# Patient Record
Sex: FEMALE | Race: WHITE | Employment: OTHER | URBAN - METROPOLITAN AREA
[De-identification: names, ages, dates, MRNs, and addresses within clinical notes are randomized per-mention and may not be internally consistent; named-entity substitution may affect disease eponyms.]

---

## 2017-04-28 PROBLEM — J30.2 SEASONAL ALLERGIC RHINITIS: Status: ACTIVE | Noted: 2017-04-28

## 2017-04-28 PROBLEM — E66.9 OBESITY (BMI 35.0-39.9 WITHOUT COMORBIDITY): Status: ACTIVE | Noted: 2017-04-28

## 2017-04-28 PROBLEM — E03.9 HYPOTHYROIDISM: Status: ACTIVE | Noted: 2017-04-28

## 2017-04-28 PROBLEM — F41.9 ANXIETY: Status: ACTIVE | Noted: 2017-04-28

## 2017-04-28 PROBLEM — K21.9 GASTROESOPHAGEAL REFLUX DISEASE WITHOUT ESOPHAGITIS: Status: ACTIVE | Noted: 2017-04-28

## 2017-04-28 PROBLEM — G47.00 INSOMNIA: Status: ACTIVE | Noted: 2017-04-28

## 2017-04-28 PROBLEM — F32.A DEPRESSION: Status: ACTIVE | Noted: 2017-04-28

## 2017-04-28 PROBLEM — M79.7 FIBROMYALGIA: Status: ACTIVE | Noted: 2017-04-28

## 2017-04-28 PROBLEM — S83.511A RIGHT ACL TEAR: Status: ACTIVE | Noted: 2017-04-28

## 2017-06-26 ENCOUNTER — HOSPITAL ENCOUNTER (OUTPATIENT)
Dept: LAB | Age: 62
Discharge: HOME | End: 2017-06-26
Attending: INTERNAL MEDICINE
Payer: MEDICARE

## 2017-06-26 DIAGNOSIS — R53.82 CHRONIC FATIGUE: ICD-10-CM

## 2017-06-26 DIAGNOSIS — E03.9 PRIMARY HYPOTHYROIDISM: ICD-10-CM

## 2017-06-26 LAB
T4 FREE SERPL-MCNC: 1.1 NG/DL (ref 0.78–1.46)
TSH W FREE THYROID I,TSHELE: 0.86 UIU/ML

## 2017-06-26 PROCEDURE — 82306 VITAMIN D 25 HYDROXY: CPT | Performed by: INTERNAL MEDICINE

## 2017-06-26 PROCEDURE — 86376 MICROSOMAL ANTIBODY EACH: CPT | Performed by: INTERNAL MEDICINE

## 2017-06-26 PROCEDURE — 84439 ASSAY OF FREE THYROXINE: CPT | Performed by: INTERNAL MEDICINE

## 2017-06-26 PROCEDURE — 84443 ASSAY THYROID STIM HORMONE: CPT | Performed by: INTERNAL MEDICINE

## 2017-06-26 PROCEDURE — 36415 COLL VENOUS BLD VENIPUNCTURE: CPT | Performed by: INTERNAL MEDICINE

## 2017-06-27 PROBLEM — E55.9 VITAMIN D DEFICIENCY: Status: ACTIVE | Noted: 2017-06-27

## 2017-06-27 LAB
25(OH)D3+25(OH)D2 SERPL-MCNC: 22.6 NG/ML (ref 30–100)
THYROPEROXIDASE AB SERPL-ACNC: 10 IU/ML (ref 0–34)

## 2017-10-11 PROBLEM — E66.01 OBESITY, MORBID, BMI 40.0-49.9 (HCC): Status: ACTIVE | Noted: 2017-10-11

## 2017-10-11 PROBLEM — M17.11 OSTEOARTHRITIS OF RIGHT KNEE: Status: ACTIVE | Noted: 2017-10-11

## 2017-10-11 PROBLEM — E66.9 OBESITY (BMI 35.0-39.9 WITHOUT COMORBIDITY): Status: RESOLVED | Noted: 2017-04-28 | Resolved: 2017-10-11

## 2017-11-21 ENCOUNTER — HOSPITAL ENCOUNTER (OUTPATIENT)
Dept: LAB | Age: 62
Discharge: HOME | End: 2017-11-21
Payer: MEDICARE

## 2017-11-21 DIAGNOSIS — E55.9 VITAMIN D DEFICIENCY: ICD-10-CM

## 2017-11-21 DIAGNOSIS — E03.9 PRIMARY HYPOTHYROIDISM: ICD-10-CM

## 2017-11-21 LAB — TSH W FREE THYROID I,TSHELE: 1.12 UIU/ML (ref 0.36–3.74)

## 2017-11-21 PROCEDURE — 36415 COLL VENOUS BLD VENIPUNCTURE: CPT | Performed by: INTERNAL MEDICINE

## 2017-11-21 PROCEDURE — 82306 VITAMIN D 25 HYDROXY: CPT | Performed by: INTERNAL MEDICINE

## 2017-11-21 PROCEDURE — 84443 ASSAY THYROID STIM HORMONE: CPT | Performed by: INTERNAL MEDICINE

## 2017-11-22 LAB — 25(OH)D3+25(OH)D2 SERPL-MCNC: 29.8 NG/ML (ref 30–100)

## 2017-11-29 PROBLEM — R53.82 CHRONIC FATIGUE: Status: ACTIVE | Noted: 2017-11-29

## 2017-12-04 ENCOUNTER — HOSPITAL ENCOUNTER (OUTPATIENT)
Dept: MAMMOGRAPHY | Age: 62
Discharge: HOME | End: 2017-12-04
Attending: NURSE PRACTITIONER
Payer: MEDICARE

## 2017-12-04 DIAGNOSIS — Z12.39 SCREENING FOR BREAST CANCER: ICD-10-CM

## 2017-12-04 PROCEDURE — 77067 SCR MAMMO BI INCL CAD: CPT

## 2018-02-28 PROBLEM — R73.9 BLOOD GLUCOSE ELEVATED: Status: ACTIVE | Noted: 2018-02-28

## 2018-03-13 ENCOUNTER — TELEPHONE (OUTPATIENT)
Dept: NUTRITION | Age: 63
End: 2018-03-13

## 2018-03-13 NOTE — TELEPHONE ENCOUNTER
Nutrition Counseling: Called pt and left voicemail with contact information regarding Regulo Walton NP's referral for nutrition counseling.     Nereyda Rascon, [address],   Outpatient Dietitian  Office: [phone]  Cell: [phone]

## 2018-03-14 ENCOUNTER — TELEPHONE (OUTPATIENT)
Dept: NUTRITION | Age: 63
End: 2018-03-14

## 2018-03-14 NOTE — TELEPHONE ENCOUNTER
Nutrition Counseling: Pt called and left voicemail for RD to call back.  RD called and left second voicemail regarding nutrition referral.    Nicolas Blanton, [address],   Outpatient Dietitian  Office: [phone]  Cell: [phone]

## 2018-03-21 ENCOUNTER — TELEPHONE (OUTPATIENT)
Dept: NUTRITION | Age: 63
End: 2018-03-21

## 2018-03-21 NOTE — TELEPHONE ENCOUNTER
Nutrition Counseling: Called pt to f/u regarding Castanon Brake, NP's referral for nutrition counseling. Pt is interested, and would like insurance checked. Explained self-pay rates. RD will contact pt when insurance check is complete to provide coverage details and schedule appt.     Froilan David, [address] [place] [address],   Outpatient Dietitian  Office: [phone]  Cell: [phone]

## 2018-03-28 ENCOUNTER — DOCUMENTATION ONLY (OUTPATIENT)
Dept: NUTRITION | Age: 63
End: 2018-03-28

## 2018-03-28 NOTE — PROGRESS NOTES
Nutrition Counseling: Emailed pt with requested insurance coverage information for nutrition counseling. Offered self-pay rates and to schedule appt if still desired. Encouraged pt to respond with questions and/or to schedule appt.     Joey Nagy, [address] [place] [address],   Outpatient Dietitian  Office: [phone]  Cell: [phone]

## 2018-04-12 ENCOUNTER — DOCUMENTATION ONLY (OUTPATIENT)
Dept: NUTRITION | Age: 63
End: 2018-04-12

## 2018-04-12 NOTE — PROGRESS NOTES
Nutrition Counseling:  Pt has not contacted RD further to schedule appt. Will close referral for this office.     Moy Gaffney, [address] [facility] [address],   Outpatient Dietitian  Office: [phone]  Cell: [phone]

## 2018-06-28 PROBLEM — A60.00 GENITAL HERPES SIMPLEX: Status: ACTIVE | Noted: 2018-06-28

## 2018-07-02 ENCOUNTER — HOSPITAL ENCOUNTER (OUTPATIENT)
Dept: SURGERY | Age: 63
Discharge: HOME | End: 2018-07-02
Payer: MEDICARE

## 2018-07-02 ENCOUNTER — HOSPITAL ENCOUNTER (OUTPATIENT)
Dept: PHYSICAL THERAPY | Age: 63
Discharge: HOME | End: 2018-07-02
Attending: ORTHOPAEDIC SURGERY
Payer: MEDICARE

## 2018-07-02 LAB
ANION GAP SERPL CALC-SCNC: 7 MMOL/L (ref 7–16)
APPEARANCE UR: CLEAR
APTT PPP: 32.9 SEC (ref 23.2–35.3)
ATRIAL RATE: 90 BPM
BACTERIA SPEC CULT: NORMAL
BILIRUB UR QL: NEGATIVE
BUN SERPL-MCNC: 21 MG/DL (ref 8–23)
CALCIUM SERPL-MCNC: 9.7 MG/DL (ref 8.3–10.4)
CALCULATED P AXIS, ECG09: 46 DEGREES
CALCULATED R AXIS, ECG10: 46 DEGREES
CALCULATED T AXIS, ECG11: 34 DEGREES
CHLORIDE SERPL-SCNC: 104 MMOL/L (ref 98–107)
CO2 SERPL-SCNC: 31 MMOL/L (ref 21–32)
COLOR UR: YELLOW
CREAT SERPL-MCNC: 1.07 MG/DL (ref 0.6–1)
DIAGNOSIS, 93000: NORMAL
ERYTHROCYTE [DISTWIDTH] IN BLOOD BY AUTOMATED COUNT: 13.7 % (ref 11.9–14.6)
GLUCOSE SERPL-MCNC: 110 MG/DL (ref 65–100)
GLUCOSE UR STRIP.AUTO-MCNC: NEGATIVE MG/DL
HCT VFR BLD AUTO: 37.5 % (ref 35.8–46.3)
HGB BLD-MCNC: 12.2 G/DL (ref 11.7–15.4)
HGB UR QL STRIP: NEGATIVE
INR PPP: 0.9
KETONES UR QL STRIP.AUTO: NEGATIVE MG/DL
LEUKOCYTE ESTERASE UR QL STRIP.AUTO: NEGATIVE
MCH RBC QN AUTO: 28.6 PG (ref 26.1–32.9)
MCHC RBC AUTO-ENTMCNC: 32.5 G/DL (ref 31.4–35)
MCV RBC AUTO: 88 FL (ref 79.6–97.8)
NITRITE UR QL STRIP.AUTO: NEGATIVE
P-R INTERVAL, ECG05: 138 MS
PH UR STRIP: 6 [PH] (ref 5–9)
PLATELET # BLD AUTO: 233 K/UL (ref 150–450)
PMV BLD AUTO: 10.5 FL (ref 10.8–14.1)
POTASSIUM SERPL-SCNC: 4.4 MMOL/L (ref 3.5–5.1)
PROT UR STRIP-MCNC: NEGATIVE MG/DL
PROTHROMBIN TIME: 12.7 SEC (ref 11.5–14.5)
Q-T INTERVAL, ECG07: 378 MS
QRS DURATION, ECG06: 86 MS
QTC CALCULATION (BEZET), ECG08: 462 MS
RBC # BLD AUTO: 4.26 M/UL (ref 4.05–5.25)
SERVICE CMNT-IMP: NORMAL
SODIUM SERPL-SCNC: 142 MMOL/L (ref 136–145)
SP GR UR REFRACTOMETRY: 1.01 (ref 1–1.02)
UROBILINOGEN UR QL STRIP.AUTO: 0.2 EU/DL (ref 0.2–1)
VENTRICULAR RATE, ECG03: 90 BPM
WBC # BLD AUTO: 5.4 K/UL (ref 4.3–11.1)

## 2018-07-02 PROCEDURE — 93005 ELECTROCARDIOGRAM TRACING: CPT | Performed by: ANESTHESIOLOGY

## 2018-07-02 PROCEDURE — 85610 PROTHROMBIN TIME: CPT | Performed by: ORTHOPAEDIC SURGERY

## 2018-07-02 PROCEDURE — 85730 THROMBOPLASTIN TIME PARTIAL: CPT | Performed by: ORTHOPAEDIC SURGERY

## 2018-07-02 PROCEDURE — G8979 MOBILITY GOAL STATUS: HCPCS

## 2018-07-02 PROCEDURE — 97161 PT EVAL LOW COMPLEX 20 MIN: CPT

## 2018-07-02 PROCEDURE — 77030027138 HC INCENT SPIROMETER -A

## 2018-07-02 PROCEDURE — 85027 COMPLETE CBC AUTOMATED: CPT | Performed by: ORTHOPAEDIC SURGERY

## 2018-07-02 PROCEDURE — 80048 BASIC METABOLIC PNL TOTAL CA: CPT | Performed by: ORTHOPAEDIC SURGERY

## 2018-07-02 PROCEDURE — 87641 MR-STAPH DNA AMP PROBE: CPT | Performed by: ORTHOPAEDIC SURGERY

## 2018-07-02 PROCEDURE — G8978 MOBILITY CURRENT STATUS: HCPCS

## 2018-07-02 PROCEDURE — 36415 COLL VENOUS BLD VENIPUNCTURE: CPT | Performed by: ORTHOPAEDIC SURGERY

## 2018-07-02 PROCEDURE — 81003 URINALYSIS AUTO W/O SCOPE: CPT | Performed by: ORTHOPAEDIC SURGERY

## 2018-07-02 PROCEDURE — G8980 MOBILITY D/C STATUS: HCPCS

## 2018-07-02 RX ORDER — DICYCLOMINE HYDROCHLORIDE 20 MG/1
20 TABLET ORAL AS NEEDED
COMMUNITY
End: 2020-07-23

## 2018-07-02 RX ORDER — CHOLECALCIFEROL TAB 125 MCG (5000 UNIT) 125 MCG
5000 TAB ORAL DAILY
COMMUNITY
End: 2018-09-28 | Stop reason: DRUGHIGH

## 2018-07-02 RX ORDER — ACETAMINOPHEN 500 MG
500 TABLET ORAL
COMMUNITY
End: 2021-03-25 | Stop reason: ALTCHOICE

## 2018-07-02 NOTE — PERIOP NOTES
Recent Results (from the past 8 hour(s))   URINALYSIS W/ RFLX MICROSCOPIC    Collection Time: 07/02/18  7:30 AM   Result Value Ref Range    Color YELLOW      Appearance CLEAR      Specific gravity 1.012 1.001 - 1.023      pH (UA) 6.0 5.0 - 9.0      Protein NEGATIVE  NEG mg/dL    Glucose NEGATIVE  mg/dL    Ketone NEGATIVE  NEG mg/dL    Bilirubin NEGATIVE  NEG      Blood NEGATIVE  NEG      Urobilinogen 0.2 0.2 - 1.0 EU/dL    Nitrites NEGATIVE  NEG      Leukocyte Esterase NEGATIVE  NEG     CBC W/O DIFF    Collection Time: 07/02/18  7:35 AM   Result Value Ref Range    WBC 5.4 4.3 - 11.1 K/uL    RBC 4.26 4.05 - 5.25 M/uL    HGB 12.2 11.7 - 15.4 g/dL    HCT 37.5 35.8 - 46.3 %    MCV 88.0 79.6 - 97.8 FL    MCH 28.6 26.1 - 32.9 PG    MCHC 32.5 31.4 - 35.0 g/dL    RDW 13.7 11.9 - 14.6 %    PLATELET 745 397 - 512 K/uL    MPV 10.5 (L) 10.8 - 16.7 FL   METABOLIC PANEL, BASIC    Collection Time: 07/02/18  7:35 AM   Result Value Ref Range    Sodium 142 136 - 145 mmol/L    Potassium 4.4 3.5 - 5.1 mmol/L    Chloride 104 98 - 107 mmol/L    CO2 31 21 - 32 mmol/L    Anion gap 7 7 - 16 mmol/L    Glucose 110 (H) 65 - 100 mg/dL    BUN 21 8 - 23 MG/DL    Creatinine 1.07 (H) 0.6 - 1.0 MG/DL    GFR est AA >60 >60 ml/min/1.73m2    GFR est non-AA 55 (L) >60 ml/min/1.73m2    Calcium 9.7 8.3 - 10.4 MG/DL   PROTHROMBIN TIME + INR    Collection Time: 07/02/18  7:35 AM   Result Value Ref Range    Prothrombin time 12.7 11.5 - 14.5 sec    INR 0.9     PTT    Collection Time: 07/02/18  7:35 AM   Result Value Ref Range    aPTT 32.9 23.2 - 35.3 SEC

## 2018-07-02 NOTE — PERIOP NOTES
Patient verified name, , and surgery as listed in [facility]. Type 3 surgery, walk in assessment complete. Labs per surgeon: cbc, bmp, pt ,ptt, ua ; results within anesthesia guidelines; placed on chart for reference; routed via fax to PCP, Dr Marilin Noble and to surgeon, Dr Renetta Sherwood for review. Labs per anesthesia protocol: none  EKG:completed per protocol and within anesthesia guidelines. Hibiclens and instructions to return bottle on DOS given per hospital policy. Nasal Swab collected per MD order and instructions for Mupirocin nasal ointment if required. Patient provided with handouts including Guide to Surgery, Pain Management, Hand Hygiene, Blood Transfusion Education, and [facility] Brochure. Patient answered medical/surgical history questions at their best of ability. All prior to admission medications documented in [facility]. Original medication prescription bottle NOT visualized during patient appointment. Patient instructed to hold all vitamins 7 days prior to surgery and NSAIDS 5 days prior to surgery. Medications to be held: Meloxicam hold for 5 days prior to surgery. Patient instructed to continue previous medications as prescribed prior to surgery and to take the following medications the day of surgery according to anesthesia guidelines with a small sip of water:  Venlafaxine, Claritin, Levothyroxine, Atenolol, Xanax; use/bring Albuterol inhaler and Flonase nasal spray. Patient teach back successful and patient demonstrates knowledge of instruction.

## 2018-07-02 NOTE — PROGRESS NOTES
Mitra MCGHEE  : [birth year](51 y.o.) [facility] at [address], [place] [address].  [phone]       Physical Therapy Prehab Plan of Treatment and Evaluation Summary:2018    ICD-10: Treatment Diagnosis:   · Pain in Right Knee (M25.561)  · Stiffness of Right Knee, Not elsewhere classified (M25.661)  · Difficulty in walking, Not elsewhere classified (R26.2)  · Other abnormalities of gait and mobility (R26.89)  Precautions/Allergies:   Latex; Keflex [cephalexin]; Pcn [penicillins]; and Sulfa (sulfonamide antibiotics)  MEDICAL/REFERRING DIAGNOSIS:  Unilateral primary osteoarthritis, right knee [M17.11]  REFERRING PHYSICIAN: Dagoberto Gu, *  DATE OF SURGERY: 18   Assessment:   Comments:  Pain in right knee joint with decreased independence with functional mobility. Pt plans to return home following hospital stay. Pt motivated and prepared for surgery. PROBLEM LIST (Impacting functional limitations):  Ms. MCGHEE presents with the following right lower extremity(s) problems:  1. Transfers  2. Gait  3. Strength  4. Range of Motion  5. Pain   INTERVENTIONS PLANNED:  1. Home Exercise Program  2. Educational Discussion     TREATMENT PLAN: Effective Dates: 2018 TO 2018. Frequency/Duration: Patient to continue to perform home exercise program at least twice per day up until her surgery. GOALS: (Goals have been discussed and agreed upon with patient.)  Discharge Goals: Time Frame: 1 Day  1. Patient will demonstrate independence with a home exercise program designed to increase strength, range of motion and pain control to minimize functional deficits and optimize patient for total joint replacement. Rehabilitation Potential For Stated Goals: Good  Regarding Mitra Barillas Camelia's therapy, I certify that the treatment plan above will be carried out by a therapist or under their direction.   Thank you for this referral,  Naina Burrows, PT HISTORY:   Present Symptoms:  Pain Intensity 1: 4  Pain Location 1: Knee  Pain Orientation 1: Right   History of Present Injury/Illness (Reason for Referral):  Medical/Referring Diagnosis: Unilateral primary osteoarthritis, right knee [M17.11]   Past Medical History/Comorbidities:   Ms. Juan Carlos Garnica  has a past medical history of Adverse effect of anesthesia; Allergic rhinitis; Asthma; Autoimmune disease ([address] DDD (degenerative disc disease), lumbar; Depression; Diabetes ([address] Diverticulitis (2016); Fibromyalgia; Fracture (2016); GERD (gastroesophageal reflux disease); Herpes genitalis in women; Hypertension; IBS (irritable bowel syndrome); Morbid obesity ([address] Primary hypothyroidism; PUD (peptic ulcer disease); and Sleep apnea. She also has no past medical history of Aneurysm ([address] Arrhythmia; CAD (coronary artery disease); Cancer ([address] Chronic kidney disease; Chronic obstructive pulmonary disease ([address] Chronic pain; Coagulation disorder ([address] Difficult intubation; Endocarditis; Heart failure ([address] Liver disease; Malignant hyperthermia due to anesthesia; Nausea & vomiting; Nicotine vapor product user; Non-nicotine vapor product user; Pseudocholinesterase deficiency; Rheumatic fever; Seizures ([address] Stroke [facility]); or Thromboembolus ([address] Ms. Juan Carlos Garnica  has a past surgical history that includes hx hysterectomy; hx tubal ligation; hx colonoscopy (09/2016); and hx endoscopy.   Social History/Living Environment:   Home Environment: Private residence  # Steps to Enter: 1  One/Two Story Residence: Two story, live on 1st floor  # of Interior Steps: 16  Living Alone: No  Support Systems: Family member(s)  Patient Expects to be Discharged to[de-identified] Private residence  Current DME Used/Available at Home: [address], [place], straight, Walker  Tub or Shower Type: Shower  Work/Activity:  disabled  Dominant Side:  RIGHT  Current Medications:  See Pre-assessment nursing note   Number of Personal Factors/Comorbidities that affect the Plan of Care: 0: LOW COMPLEXITY   EXAMINATION:   ADLs (Current Functional Status):   Ambulation:  [] Independent  [] Walk Indoors Only  [] Walk Outdoors  [x] Use Assistive Device cane  [] Use Wheelchair Only Dressing:  [x] Independent  Requires Assistance from Someone for:  [] Sock/Shoes  [] Pants  [] Everything   Bathing/Showering:   [x] Independent  [] Requires Assistance from Someone  [] Sponge Bath Only Household Activities:  [] Routine house and yard work  [x] Light Housework Only  [] None   Observation/Orthostatic Postural Assessment:   Within defined limits   ROM/Flexibility:   Gross Assessment: Yes  AROM: Generally decreased, functional                LLE Assessment  LLE Assessment (WDL): Within defined limits      RLE AROM  R Knee Flexion: 100  R Knee Extension: 15   Strength:   Gross Assessment: Yes  Strength: Generally decreased, functional                  Functional Mobility:    Gross Assessment: Yes  Coordination: Generally decreased, functional    Gait Description (WDL): Within defined limits  Stand to Sit: Independent  Sit to Stand: Independent  Distance (ft): 1000 Feet (ft)  Ambulation - Level of Assistance: Modified independent  Assistive Device: Cane, straight  Gait Abnormalities: Antalgic          Balance:    Sitting: Intact  Standing: Intact   Body Structures Involved:  1. Bones  2. Joints  3. Muscles  4. Ligaments Body Functions Affected:  1. Neuromusculoskeletal  2. Movement Related Activities and Participation Affected:  1. Mobility   Number of elements that affect the Plan of Care: 4+: HIGH COMPLEXITY   CLINICAL PRESENTATION:   Presentation: Stable and uncomplicated: LOW COMPLEXITY   CLINICAL DECISION MAKING:   Outcome Measure:    Tool Used: Lower Extremity Functional Scale (LEFS)  Score:  Initial: 4/80 Most Recent: X/80 (Date: -- )   Interpretation of Score: 20 questions each scored on a 5 point scale with 0 representing \"extreme difficulty or unable to perform\" and 4 representing \"no difficulty\". The lower the score, the greater the functional disability. 80/80 represents no disability. Minimal detectable change is 9 points. Score 80 79-65 64-49 48-33 32-17 16-1 0   Modifier CH CI CJ CK CL CM CN     ? Mobility - Walking and Moving Around:     - CURRENT STATUS: CM - 80%-99% impaired, limited or restricted    - GOAL STATUS: CM - 80%-99% impaired, limited or restricted    - D/C STATUS:  CM - 80%-99% impaired, limited or restricted  Medical Necessity:   · Ms. Denise is expected to optimize her lower extremity strength and ROM in preparation for joint replacement surgery. Reason for Services/Other Comments:  · Achieve baseline assesment of musculoskeletal system, functional mobility and home environment. , educate in PT HEP in preparation for surgery, educate in hospital plan of care. Use of outcome tool(s) and clinical judgement create a POC that gives a: Clear prediction of patient's progress: LOW COMPLEXITY   TREATMENT:   Treatment/Session Assessment:  Patient was instructed in PT- HEP to increase strength and ROM in LEs. Answered all questions. · Post session pain:  4  · Compliance with Program/Exercises: compliant most of the time.   Total Treatment Duration:  PT Patient Time In/Time Out  Time In: 0800  Time Out: 0830    José Delvalle PT

## 2018-07-03 VITALS
RESPIRATION RATE: 16 BRPM | HEART RATE: 83 BPM | OXYGEN SATURATION: 100 % | DIASTOLIC BLOOD PRESSURE: 79 MMHG | WEIGHT: 245.25 LBS | HEIGHT: 63 IN | SYSTOLIC BLOOD PRESSURE: 132 MMHG | TEMPERATURE: 97.1 F | BODY MASS INDEX: 43.45 KG/M2

## 2018-07-03 NOTE — ADVANCED PRACTICE NURSE
Total Joint Surgery Preoperative Chart Review      Patient ID:  Nikita Osorio  [id number]  73 y.o.  1955  Surgeon: Dr. Isra Fritz  Date of Surgery: 7/25/2018  Procedure: Total Right Knee Arthroplasty  Primary Care Physician: Tara Bobby [phone]  Specialty Physician(s):      Subjective:   Nikita Osorio is a 58 y.o. WHITE OR  female who presents for preoperative evaluation for Total Right Knee arthroplasty. This is a preoperative chart review note based on data collected by the nurse at the surgical Pre-Assessment visit. Past Medical History:   Diagnosis Date    Adverse effect of anesthesia     hard to wake up    Allergic rhinitis     Asthma     Autoimmune disease ([address]     fibromyalgia    DDD (degenerative disc disease), lumbar     Depression     Diabetes ([address]     pre-diabetes~diet controlled; does not check SQBS regularly    Diverticulitis 2016    diagnosed at Colonoscopy    Fibromyalgia     Fracture 2016    right foot    GERD (gastroesophageal reflux disease)     Herpes genitalis in women     dormant    Hypertension     IBS (irritable bowel syndrome)     Morbid obesity ([address]     Primary hypothyroidism     PUD (peptic ulcer disease)     Sleep apnea     had 2 studies~\"mild\" ~no CPAP      Past Surgical History:   Procedure Laterality Date    HX COLONOSCOPY  09/2016    HX ENDOSCOPY      during colonoscopy    HX HYSTERECTOMY      HX TUBAL LIGATION       Family History   Problem Relation Age of Onset    Diabetes Sister     Cancer Mother      Lung    COPD Father     Hypertension Father     GERD Father     Thyroid Disease Daughter      Hypothyroidism      Social History   Substance Use Topics    Smoking status: Never Smoker    Smokeless tobacco: Never Used    Alcohol use Yes      Comment: rarely       Prior to Admission medications    Medication Sig Start Date End Date Taking?  Authorizing Provider   dicyclomine (BENTYL) 20 mg tablet Take 20 mg by mouth as needed. For diarrhea   Yes Historical Provider   cholecalciferol, VITAMIN D3, (VITAMIN D3) 5,000 unit tab tablet Take 5,000 Units by mouth daily. Indications: PREVENTION OF VITAMIN D DEFICIENCY   Yes Historical Provider   cyanocobalamin, vitamin B-12, (VITAMIN B-12 PO) Take  by mouth daily. Yes Historical Provider   acetaminophen (TYLENOL EXTRA STRENGTH) 500 mg tablet Take 500 mg by mouth every six (6) hours as needed for Pain. Indications: Pain   Yes Historical Provider   acyclovir (ZOVIRAX) 400 mg tablet Take 1 Tab by mouth three (3) times daily for 5 days. Patient taking differently: Take 400 mg by mouth as needed. Indications: SUPPRESSION OF RECURRENT HERPES SIMPLEX INFECTION 6/28/18 7/3/18 Yes Viji Persaud NP   amitriptyline (ELAVIL) 25 mg tablet Take 1 Tab by mouth nightly as needed for Sleep. Patient taking differently: Take 25 mg by mouth nightly as needed for Sleep. For sleep, as needed 6/27/18  Yes Viji Persaud NP   lisinopril-hydroCHLOROthiazide (PRINZIDE, ZESTORETIC) 20-12.5 mg per tablet Take 1 Tab by mouth daily. Patient taking differently: Take 1 Tab by mouth daily. Indications: hypertension 6/27/18  Yes Viji Persaud NP   loratadine (CLARITIN) 10 mg tablet Take 1 Tab by mouth daily. Patient taking differently: Take 10 mg by mouth daily. Take / use AM day of surgery  per anesthesia protocols. 6/27/18  Yes Viji Persaud NP   atenolol (TENORMIN) 50 mg tablet TAKE 1 TABLET EVERY DAY  Patient taking differently: Take 50 mg by mouth daily. TAKE 1 TABLET EVERY DAY; Take / use AM day of surgery  per anesthesia protocols. Indications: hypertension 6/27/18  Yes Viji Persaud NP   meloxicam (MOBIC) 15 mg tablet Take 1 Tab by mouth daily as needed for Pain. 6/27/18  Yes Viji Persaud NP   venlafaxine-SR [facility] 150 mg capsule Take 2 Caps by mouth daily. Patient taking differently: Take 300 mg by mouth daily. Take / use AM day of surgery  per anesthesia protocols.   Indications: ANXIETY WITH DEPRESSION, major depressive disorder 6/27/18  Yes Enoch Powell NP   omeprazole (PRILOSEC) 20 mg capsule Take 1 Cap by mouth daily. Patient taking differently: Take 20 mg by mouth nightly. Indications: gastroesophageal reflux disease 6/27/18  Yes Enoch Powell NP   albuterol (PROVENTIL HFA, VENTOLIN HFA, PROAIR HFA) 90 mcg/actuation inhaler Take 2 Puffs by inhalation every six (6) hours as needed. Patient taking differently: Take 2 Puffs by inhalation daily. Take / use AM day of surgery  per anesthesia protocols. 6/27/18  Yes Enoch Powell NP   fluticasone (FLOVENT HFA) 110 mcg/actuation inhaler Take 2 Puffs by inhalation every twelve (12) hours. 6/27/18  Yes Enoch Powell NP   fluticasone (FLONASE) 50 mcg/actuation nasal spray 2 Sprays by Both Nostrils route daily. Patient taking differently: 2 Sprays by Both Nostrils route daily. Take / use AM day of surgery  per anesthesia protocols. Indications: Allergic Rhinitis 6/27/18  Yes Enoch Powell NP   levothyroxine (SYNTHROID) 150 mcg tablet Take 1 Tab by mouth Daily (before breakfast). Patient taking differently: Take 150 mcg by mouth Daily (before breakfast). Take / use AM day of surgery  per anesthesia protocols. 6/27/18  Yes Enoch Powell NP   ALPRAZolam [facility]) 0.5 mg tablet Take 1 Tab by mouth daily as needed for Anxiety. Patient taking differently: Take 0.5 mg by mouth daily as needed for Anxiety. Take / use AM day of surgery  per anesthesia protocols. Indications: anxiety 6/27/18  Yes Enoch Powell NP   Blood-Glucose Meter monitoring kit Use as instructed. Patient's choice of glucometer. Dx: E11.9 7/18/16  Yes Historical Provider   glucose blood VI test strips (ASCENSIA AUTODISC VI, ONE TOUCH ULTRA TEST VI) strip by Does Not Apply route. 7/18/16  Yes Historical Provider   Lancets misc by Does Not Apply route.  7/18/16  Yes Historical Provider     Allergies   Allergen Reactions    Latex Other (comments)     Burns; blisters    Keflex [Cephalexin] Hives    Pcn [Penicillins] Hives    Sulfa (Sulfonamide Antibiotics) Hives          Objective:     Physical Exam:   Visit Vitals    /79 (BP 1 Location: Right arm, BP Patient Position: Sitting)    Pulse 83    Temp 97.1 °F (36.2 °C)    Resp 16    Ht 5' 3\" (1.6 m)    Wt 111.2 kg (245 lb 4 oz)    SpO2 100%    BMI 43.44 kg/m2         ECG:    EKG Results     Procedure 720 Value Units Date/Time    EKG, 12 LEAD, INITIAL [[id number]] Collected:  07/02/18 0814    Order Status:  Completed Updated:  07/02/18 1639     Ventricular Rate 90 BPM      Atrial Rate 90 BPM      P-R Interval 138 ms      QRS Duration 86 ms      Q-T Interval 378 ms      QTC Calculation (Bezet) 462 ms      Calculated P Axis 46 degrees      Calculated R Axis 46 degrees      Calculated T Axis 34 degrees      Diagnosis --     Normal sinus rhythm  Nonspecific ST abnormality  Abnormal ECG  No previous ECGs available  Confirmed by Francois Garcia ([id number]) on 7/2/2018 4:39:35 PM            Data Review:   Labs:   Results for Earline Lundborg (MRN [id number]) as of 7/3/2018 12:16   Ref.  Range 7/2/2018 07:35   Sodium Latest Ref Range: 136 - 145 mmol/L 142   Potassium Latest Ref Range: 3.5 - 5.1 mmol/L 4.4   Chloride Latest Ref Range: 98 - 107 mmol/L 104   CO2 Latest Ref Range: 21 - 32 mmol/L 31   Anion gap Latest Ref Range: 7 - 16 mmol/L 7   Glucose Latest Ref Range: 65 - 100 mg/dL 110 (H)   BUN Latest Ref Range: 8 - 23 MG/DL 21   Creatinine Latest Ref Range: 0.6 - 1.0 MG/DL 1.07 (H)   Calcium Latest Ref Range: 8.3 - 10.4 MG/DL 9.7   GFR est non-AA Latest Ref Range: >60 ml/min/1.73m2 55 (L)   GFR est AA Latest Ref Range: >60 ml/min/1.73m2 >60         Problem List:  )  Patient Active Problem List   Diagnosis Code    DDD (degenerative disc disease), lumbar M51.36    Asthma J45.909    Hypertension I10    IBS (irritable bowel syndrome) K58.9    Gastroesophageal reflux disease without esophagitis K21.9    Right ACL tear A86.119O    Fibromyalgia M79.7    Seasonal allergic rhinitis J30.2    Depression F32.9    Anxiety F41.9    Insomnia G47.00    Primary hypothyroidism E03.9    Vitamin D deficiency E55.9    Osteoarthritis of right knee M17.11    Obesity, morbid, BMI 40.0-49.9 ([facility]) E66.01    Chronic fatigue R53.82    Blood glucose elevated R73.9    Genital herpes simplex A60.00       Total Joint Surgery Pre-Assessment Recommendations:     Patient with multiple comorbidities including:  BMI greater than 40, sleep apnea not using CPAP  Patient would benefit from inpatient hospitalization with total knee surgery. Patient reports the symptoms of snoring, observed apnea and /or excessive daytime sleepiness. Will refer patient for HST based on above assessment. Recommend continuous saturation monitoring hours of sleep, during hospitalization. Albuterol every 6 hours as need during hospitalization.                  Signed By: PRESTON Lopez    July 3, 2018

## 2018-07-03 NOTE — PROGRESS NOTES
07/02/18 0730   Oxygen Therapy   O2 Sat (%) 96 %   Pulse via Oximetry 120 beats per minute   O2 Device Room air   Pre-Treatment   Breath Sounds Bilateral Clear;Diminished   Pre FEV1 (liters) 1.6 liters   % Predicted 68  (ASTHMA)   Incentive Spirometry Treatment   Actual Volume (ml) 1500 ml   Sleep Disorder Breathing Screen:     Patient reports symptoms of:   · Snoring   · Excessive daytime sleepiness with napping        HTN        DRY MOUTH  · STOP-BANG _5___  · SACS Score _6___  · Height__5'3\"___ [id number] lbs____  · DELAYED AWAKENING AFTER ANESTHESIA  · PREVIOUS DIAGNOSIS OF SIVA  · RLS     Refer patient for sleep study based on above assessment. Initial respiratory Assessment completed with pt. Pt was interviewed and evaluated in [facility] prior to surgery. Patient ID:  Blas Loza  [id number]  89 y.o.  1955  Surgeon: Dr. Lyndel Hatchet  Date of Surgery: 7/25/2018  Procedure: Total Right Knee Arthroplasty  Primary Care Physician: Tamela Coe [phone]  Specialists:                                  Pt instructed in the use of Incentive Spirometry. Pt instructed to bring Incentive Spirometer back on date of surgery & to start using Is upon return to pt room.     Pt taught proper cough technique    History of smoking:   NONE                                                       Quit date:            Secondhand smoke:PARENTS      Past procedures with Oxygen desaturation:DELAYED AWAKENING    Past Medical History:   Diagnosis Date    Adverse effect of anesthesia     hard to wake up    Allergic rhinitis     Asthma     Autoimmune disease ([address]     fibromyalgia    DDD (degenerative disc disease), lumbar     Depression     Diabetes ([address]     pre-diabetes~diet controlled; does not check SQBS regularly    Diverticulitis 2016    diagnosed at Colonoscopy    Fibromyalgia     Fracture 2016    right foot    GERD (gastroesophageal reflux disease)     Herpes genitalis in women     dormant    Hypertension     IBS (irritable bowel syndrome)     Morbid obesity (HCC)     Primary hypothyroidism     PUD (peptic ulcer disease)     Sleep apnea     had 2 studies~\"mild\" ~no CPAP                                                                                                                                                      Respiratory history:ASTHMA                                 SOB  ON EXERTION                                    Respiratory meds:  FLOVENT BID  VENTALIN MDI PRN                                       FAMILY PRESENT:                                                             NO                                        PAST SLEEP STUDY:        YES                      HX OF SIVA:                        YES        - PT HAD CPAP ION THE PAST BUT NO LONGER HAS. DANGERS OF NON-COMPLIANCE EXPLAINED TO PT                                             SIVA assessment:                                               SLEEPS ON SIDE                                                              PHYSICAL EXAM   Body mass index is 43.44 kg/(m^2).    Visit Vitals    /79 (BP 1 Location: Right arm, BP Patient Position: Sitting)    Pulse 83    Temp 97.1 °F (36.2 °C)    Resp 16    Ht 5' 3\" (1.6 m)    Wt 111.2 kg (245 lb 4 oz)    SpO2 100%    BMI 43.44 kg/m2     Neck circumference:  36.5    cm    Loud snoring:        YES                                 Witnessed apnea or wakening gasping or choking:,             DENIES,                                                                                                  Awakens with headaches:                                                  DENIES    Morning or daytime tiredness/ sleepiness:                                                                                                           TIRED   Dry mouth or sore throat in morning:                YES                                                                            Clarke stage:  4    SACS score:6    STOP/BAN                              CPAP:                       NONE                                       ALBUTEROL NEB Q6 PRN          SPACER         CONT SAT HS            Referrals:  [facility]  Pt.  Phone Number:  [phone]

## 2018-07-24 ENCOUNTER — ANESTHESIA EVENT (OUTPATIENT)
Dept: SURGERY | Age: 63
DRG: 470 | End: 2018-07-24
Payer: MEDICARE

## 2018-07-24 NOTE — H&P
[facility]  Pre Operative History and Physical Exam    Patient ID:  Kirby Abernathy  [id number]  39 y.o.  1955    Today: July 24, 2018       Assessment:   1. Arthritis of the right knee        Plan:    1. Proceed with scheduled Procedure(s) (LRB):  RIGHT KNEE ARTHROPLASTY TOTAL / DARIAN / FNB (Right)            CC:  Right knee pain    HPI:   The patient has end stage arthritis of the right knee. The patient was evaluated and examined during a consultation prior to this office visit. There have been no changes to the patient's orthopedic condition since the initial consultation. The patient has failed previous conservative treatment for this condition including antiinflammatories , and lifestyle modifications. The necessity for joint replacement is present. The patient will be admitted the day of surgery for Procedure(s) (LRB):  RIGHT KNEE ARTHROPLASTY TOTAL / Markell Radha / FNB (Right)      Past Medical/Surgical History:  Past Medical History:   Diagnosis Date    Adverse effect of anesthesia     hard to wake up    Allergic rhinitis     Asthma     Autoimmune disease ([address]     fibromyalgia    DDD (degenerative disc disease), lumbar     Depression     Diabetes ([address]     pre-diabetes~diet controlled; does not check SQBS regularly    Diverticulitis 2016    diagnosed at Colonoscopy    Fibromyalgia     Fracture 2016    right foot    GERD (gastroesophageal reflux disease)     Herpes genitalis in women     dormant    Hypertension     IBS (irritable bowel syndrome)     Morbid obesity ([address]     Primary hypothyroidism     PUD (peptic ulcer disease)     Sleep apnea     had 2 studies~\"mild\" ~no CPAP     Past Surgical History:   Procedure Laterality Date    HX COLONOSCOPY  09/2016    HX ENDOSCOPY      during colonoscopy    HX HYSTERECTOMY      HX TUBAL LIGATION          Allergies:    Allergies   Allergen Reactions    Latex Other (comments)     Burns; blisters    Keflex [Cephalexin] Hives    Pcn [Penicillins] Hives    Sulfa (Sulfonamide Antibiotics) Hives        Physical Exam:   General: NAD, Alert, Oriented, Appears their stated age     [de-identified]: NC/AT, PERRL    Skin: No rashes, lesions or wounds seen      Psych: normal affect      Heart: Regular Rate, Rhythm     Lungs: unlabored respirations, normal breath sounds     Abdomen: Soft and non-distended     Ortho: Pain with limited ROM of the right knee    Neuro: no focal defects, sensation is equal bilaterally     Lymph: no lymphadenopathy     Meds:   No current facility-administered medications for this encounter. Current Outpatient Prescriptions   Medication Sig    dicyclomine (BENTYL) 20 mg tablet Take 20 mg by mouth as needed. For diarrhea    cholecalciferol, VITAMIN D3, (VITAMIN D3) 5,000 unit tab tablet Take 5,000 Units by mouth daily. Indications: PREVENTION OF VITAMIN D DEFICIENCY    cyanocobalamin, vitamin B-12, (VITAMIN B-12 PO) Take  by mouth daily.  acetaminophen (TYLENOL EXTRA STRENGTH) 500 mg tablet Take 500 mg by mouth every six (6) hours as needed for Pain. Indications: Pain    amitriptyline (ELAVIL) 25 mg tablet Take 1 Tab by mouth nightly as needed for Sleep. (Patient taking differently: Take 25 mg by mouth nightly as needed for Sleep. For sleep, as needed)    lisinopril-hydroCHLOROthiazide (PRINZIDE, ZESTORETIC) 20-12.5 mg per tablet Take 1 Tab by mouth daily. (Patient taking differently: Take 1 Tab by mouth daily. Indications: hypertension)    loratadine (CLARITIN) 10 mg tablet Take 1 Tab by mouth daily. (Patient taking differently: Take 10 mg by mouth daily. Take / use AM day of surgery  per anesthesia protocols.)    atenolol (TENORMIN) 50 mg tablet TAKE 1 TABLET EVERY DAY (Patient taking differently: Take 50 mg by mouth daily. TAKE 1 TABLET EVERY DAY; Take / use AM day of surgery  per anesthesia protocols.   Indications: hypertension)    meloxicam (MOBIC) 15 mg tablet Take 1 Tab by mouth daily as needed for Pain.  venlafaxine-SR (EFFEXOR-XR) 150 mg capsule Take 2 Caps by mouth daily. (Patient taking differently: Take 300 mg by mouth daily. Take / use AM day of surgery  per anesthesia protocols. Indications: ANXIETY WITH DEPRESSION, major depressive disorder)    omeprazole (PRILOSEC) 20 mg capsule Take 1 Cap by mouth daily. (Patient taking differently: Take 20 mg by mouth nightly. Indications: gastroesophageal reflux disease)    albuterol (PROVENTIL HFA, VENTOLIN HFA, PROAIR HFA) 90 mcg/actuation inhaler Take 2 Puffs by inhalation every six (6) hours as needed. (Patient taking differently: Take 2 Puffs by inhalation daily. Take / use AM day of surgery  per anesthesia protocols.)    fluticasone (FLOVENT HFA) 110 mcg/actuation inhaler Take 2 Puffs by inhalation every twelve (12) hours.  fluticasone (FLONASE) 50 mcg/actuation nasal spray 2 Sprays by Both Nostrils route daily. (Patient taking differently: 2 Sprays by Both Nostrils route daily. Take / use AM day of surgery  per anesthesia protocols. Indications: Allergic Rhinitis)    levothyroxine (SYNTHROID) 150 mcg tablet Take 1 Tab by mouth Daily (before breakfast). (Patient taking differently: Take 150 mcg by mouth Daily (before breakfast). Take / use AM day of surgery  per anesthesia protocols.)    ALPRAZolam (XANAX) 0.5 mg tablet Take 1 Tab by mouth daily as needed for Anxiety. (Patient taking differently: Take 0.5 mg by mouth daily as needed for Anxiety. Take / use AM day of surgery  per anesthesia protocols. Indications: anxiety)    Blood-Glucose Meter monitoring kit Use as instructed. Patient's choice of glucometer. Dx: E11.9    glucose blood VI test strips (ASCENSIA AUTODISC VI, ONE TOUCH ULTRA TEST VI) strip by Does Not Apply route.  Lancets misc by Does Not Apply route.          Labs:  Hospital Outpatient Visit on 07/02/2018   Component Date Value Ref Range Status    WBC 07/02/2018 5.4  4.3 - 11.1 K/uL Final    RBC 07/02/2018 4.26 4.05 - 5.25 M/uL Final    HGB 07/02/2018 12.2  11.7 - 15.4 g/dL Final    HCT 07/02/2018 37.5  35.8 - 46.3 % Final    MCV 07/02/2018 88.0  79.6 - 97.8 FL Final    MCH 07/02/2018 28.6  26.1 - 32.9 PG Final    MCHC 07/02/2018 32.5  31.4 - 35.0 g/dL Final    RDW 07/02/2018 13.7  11.9 - 14.6 % Final    PLATELET 78/80/9180 822  150 - 450 K/uL Final    MPV 07/02/2018 10.5* 10.8 - 14.1 FL Final    Sodium 07/02/2018 142  136 - 145 mmol/L Final    Potassium 07/02/2018 4.4  3.5 - 5.1 mmol/L Final    Chloride 07/02/2018 104  98 - 107 mmol/L Final    CO2 07/02/2018 31  21 - 32 mmol/L Final    Anion gap 07/02/2018 7  7 - 16 mmol/L Final    Glucose 07/02/2018 110* 65 - 100 mg/dL Final    Comment: 47 - 60 mg/dl Consistent with, but not fully diagnostic of hypoglycemia. 101 - 125 mg/dl Impaired fasting glucose/consistent with pre-diabetes mellitus  > 126 mg/dl Fasting glucose consistent with overt diabetes mellitus      BUN 07/02/2018 21  8 - 23 MG/DL Final    Creatinine 07/02/2018 1.07* 0.6 - 1.0 MG/DL Final    GFR est AA 07/02/2018 >60  >60 ml/min/1.73m2 Final    GFR est non-AA 07/02/2018 55* >60 ml/min/1.73m2 Final    Comment: (NOTE)  Estimated GFR is calculated using the Modification of Diet in Renal   Disease (MDRD) Study equation, reported for both  Americans   (GFRAA) and non- Americans (GFRNA), and normalized to 1.73m2   body surface area. The physician must decide which value applies to   the patient. The MDRD study equation should only be used in   individuals age 25 or older. It has not been validated for the   following: pregnant women, patients with serious comorbid conditions,   or on certain medications, or persons with extremes of body size,   muscle mass, or nutritional status.       Calcium 07/02/2018 9.7  8.3 - 10.4 MG/DL Final    Prothrombin time 07/02/2018 12.7  11.5 - 14.5 sec Final    INR 07/02/2018 0.9    Final    Comment: Suggested therapeutic INR range:  Venous thrombosis and embolus  2.0-3.0  Prosthetic heart valve         2.5-3.5  ** Note new reference range and method **      aPTT 07/02/2018 32.9  23.2 - 35.3 SEC Final    Comment: Heparin Therapeutic Range = 74 - 123 seconds  In addition to factor deficiency, monitoring heparin therapy, etc., evaluation of a prolonged aPTT result should include consideration of preanalytic variables such as heparin flush contamination, specimen integrity issues, etc.  ** Note new reference range and method **      Color 07/02/2018 YELLOW    Final    Appearance 07/02/2018 CLEAR    Final    Specific gravity 07/02/2018 1.012  1.001 - 1.023   Final    pH (UA) 07/02/2018 6.0  5.0 - 9.0   Final    Protein 07/02/2018 NEGATIVE   NEG mg/dL Final    Glucose 07/02/2018 NEGATIVE   mg/dL Final    Ketone 07/02/2018 NEGATIVE   NEG mg/dL Final    Bilirubin 07/02/2018 NEGATIVE   NEG   Final    Blood 07/02/2018 NEGATIVE   NEG   Final    Urobilinogen 07/02/2018 0.2  0.2 - 1.0 EU/dL Final    Nitrites 07/02/2018 NEGATIVE   NEG   Final    Leukocyte Esterase 07/02/2018 NEGATIVE   NEG   Final    Special Requests: 07/02/2018 NO SPECIAL REQUESTS    Final    Culture result: 07/02/2018 SA target not detected. A MRSA NEGATIVE, SA NEGATIVE test result does not preclude MRSA or SA nasal colonization.     Final    Ventricular Rate 07/02/2018 90  BPM Final    Atrial Rate 07/02/2018 90  BPM Final    P-R Interval 07/02/2018 138  ms Final    QRS Duration 07/02/2018 86  ms Final    Q-T Interval 07/02/2018 378  ms Final    QTC Calculation (Bezet) 07/02/2018 462  ms Final    Calculated P Axis 07/02/2018 46  degrees Final    Calculated R Axis 07/02/2018 46  degrees Final    Calculated T Axis 07/02/2018 34  degrees Final    Diagnosis 07/02/2018    Final                    Value:Normal sinus rhythm  Nonspecific ST abnormality  Abnormal ECG  No previous ECGs available  Confirmed by Abbey Severin ([id number]) on 7/2/2018 4:39:35 PM Office Visit on 06/27/2018   Component Date Value Ref Range Status    WBC 06/27/2018 4.1  3.4 - 10.8 x10E3/uL Final    RBC 06/27/2018 4.27  3.77 - 5.28 x10E6/uL Final    HGB 06/27/2018 12.4  11.1 - 15.9 g/dL Final    HCT 06/27/2018 37.0  34.0 - 46.6 % Final    MCV 06/27/2018 87  79 - 97 fL Final    MCH 06/27/2018 29.0  26.6 - 33.0 pg Final    MCHC 06/27/2018 33.5  31.5 - 35.7 g/dL Final    RDW 06/27/2018 14.4  12.3 - 15.4 % Final    PLATELET 66/94/0186 977  150 - 379 x10E3/uL Final    NEUTROPHILS 06/27/2018 68  Not Estab. % Final    Lymphocytes 06/27/2018 26  Not Estab. % Final    MONOCYTES 06/27/2018 6  Not Estab. % Final    EOSINOPHILS 06/27/2018 0  Not Estab. % Final    BASOPHILS 06/27/2018 0  Not Estab. % Final    ABS. NEUTROPHILS 06/27/2018 2.8  1.4 - 7.0 x10E3/uL Final    Abs Lymphocytes 06/27/2018 1.0  0.7 - 3.1 x10E3/uL Final    ABS. MONOCYTES 06/27/2018 0.3  0.1 - 0.9 x10E3/uL Final    ABS. EOSINOPHILS 06/27/2018 0.0  0.0 - 0.4 x10E3/uL Final    ABS. BASOPHILS 06/27/2018 0.0  0.0 - 0.2 x10E3/uL Final    IMMATURE GRANULOCYTES 06/27/2018 0  Not Estab. % Final    ABS. IMM.  GRANS. 06/27/2018 0.0  0.0 - 0.1 x10E3/uL Final    Glucose 06/27/2018 95  65 - 99 mg/dL Final    BUN 06/27/2018 13  8 - 27 mg/dL Final    Creatinine 06/27/2018 0.74  0.57 - 1.00 mg/dL Final    GFR est non-AA 06/27/2018 87  >59 mL/min/1.73 Final    GFR est AA 06/27/2018 100  >59 mL/min/1.73 Final    BUN/Creatinine ratio 06/27/2018 18  12 - 28 Final    Sodium 06/27/2018 143  134 - 144 mmol/L Final    Potassium 06/27/2018 4.6  3.5 - 5.2 mmol/L Final    Chloride 06/27/2018 105  96 - 106 mmol/L Final    CO2 06/27/2018 25  20 - 29 mmol/L Final                  **Please note reference interval change**    Calcium 06/27/2018 9.1  8.7 - 10.3 mg/dL Final    Protein, total 06/27/2018 6.9  6.0 - 8.5 g/dL Final    Albumin 06/27/2018 4.3  3.6 - 4.8 g/dL Final    GLOBULIN, TOTAL 06/27/2018 2.6  1.5 - 4.5 g/dL Final    A-G Ratio 06/27/2018 1.7  1.2 - 2.2 Final    Bilirubin, total 06/27/2018 0.2  0.0 - 1.2 mg/dL Final    Alk. phosphatase 06/27/2018 126* 39 - 117 IU/L Final    AST (SGOT) 06/27/2018 21  0 - 40 IU/L Final    ALT (SGPT) 06/27/2018 18  0 - 32 IU/L Final    VITAMIN D, 25-HYDROXY 06/27/2018 44.1  30.0 - 100.0 ng/mL Final    Comment: Vitamin D deficiency has been defined by the [address] practice guideline as a  level of serum 25-OH vitamin D less than 20 ng/mL (1,2). The Endocrine Society went on to further define vitamin D  insufficiency as a level between 21 and 29 ng/mL (2). 1. IOM ([place] of Medicine). 2010. Dietary reference     intakes for calcium and D. [facility]: The     [employer]. 2. Saba MF, Jerry NC, Jeanna AGUAYO, et al.     Evaluation, treatment, and prevention of vitamin D     deficiency: an Endocrine Society clinical practice     guideline. JCEM. 2011 Jul; 96(7):1911-30.       TSH 06/27/2018 1.670  0.450 - 4.500 uIU/mL Final    T4, Free 06/27/2018 1.43  0.82 - 1.77 ng/dL Final                 Patient Active Problem List   Diagnosis Code    DDD (degenerative disc disease), lumbar M51.36    Asthma J45.909    Hypertension I10    IBS (irritable bowel syndrome) K58.9    Gastroesophageal reflux disease without esophagitis K21.9    Right ACL tear S83.511A    Fibromyalgia M79.7    Seasonal allergic rhinitis J30.2    Depression F32.9    Anxiety F41.9    Insomnia G47.00    Primary hypothyroidism E03.9    Vitamin D deficiency E55.9    Osteoarthritis of right knee M17.11    Obesity, morbid, BMI 40.0-49.9 (HCC) E66.01    Chronic fatigue R53.82    Blood glucose elevated R73.9    Genital herpes simplex A60.00         Signed By: JUSTICE Joseph  July 24, 2018

## 2018-07-25 ENCOUNTER — HOSPITAL ENCOUNTER (INPATIENT)
Age: 63
LOS: 1 days | Discharge: HOME HEALTH | DRG: 470 | End: 2018-07-26
Attending: ORTHOPAEDIC SURGERY | Admitting: ORTHOPAEDIC SURGERY
Payer: MEDICARE

## 2018-07-25 ENCOUNTER — HOME HEALTH ADMISSION (OUTPATIENT)
Dept: HOME HEALTH SERVICES | Facility: HOME HEALTH | Age: 63
End: 2018-07-25
Payer: MEDICARE

## 2018-07-25 ENCOUNTER — ANESTHESIA (OUTPATIENT)
Dept: SURGERY | Age: 63
DRG: 470 | End: 2018-07-25
Payer: MEDICARE

## 2018-07-25 DIAGNOSIS — M17.11 OSTEOARTHRITIS OF RIGHT KNEE, UNSPECIFIED OSTEOARTHRITIS TYPE: ICD-10-CM

## 2018-07-25 DIAGNOSIS — Z96.651 STATUS POST RIGHT KNEE REPLACEMENT: Primary | ICD-10-CM

## 2018-07-25 DIAGNOSIS — M51.36 DDD (DEGENERATIVE DISC DISEASE), LUMBAR: ICD-10-CM

## 2018-07-25 DIAGNOSIS — R53.82 CHRONIC FATIGUE: ICD-10-CM

## 2018-07-25 LAB
ABO + RH BLD: NORMAL
BLOOD GROUP ANTIBODIES SERPL: NORMAL
GLUCOSE BLD STRIP.AUTO-MCNC: 95 MG/DL (ref 65–100)
HGB BLD-MCNC: 10 G/DL (ref 11.7–15.4)
SPECIMEN EXP DATE BLD: NORMAL

## 2018-07-25 PROCEDURE — 77030018836 HC SOL IRR NACL ICUM -A: Performed by: ORTHOPAEDIC SURGERY

## 2018-07-25 PROCEDURE — 74011000250 HC RX REV CODE- 250: Performed by: ORTHOPAEDIC SURGERY

## 2018-07-25 PROCEDURE — 77030036688 HC BLNKT CLD THER S2SG -B

## 2018-07-25 PROCEDURE — 97161 PT EVAL LOW COMPLEX 20 MIN: CPT

## 2018-07-25 PROCEDURE — 77030003665 HC NDL SPN BBMI -A: Performed by: NURSE ANESTHETIST, CERTIFIED REGISTERED

## 2018-07-25 PROCEDURE — 86580 TB INTRADERMAL TEST: CPT | Performed by: ORTHOPAEDIC SURGERY

## 2018-07-25 PROCEDURE — 74011250637 HC RX REV CODE- 250/637: Performed by: ANESTHESIOLOGY

## 2018-07-25 PROCEDURE — 74011000258 HC RX REV CODE- 258: Performed by: ORTHOPAEDIC SURGERY

## 2018-07-25 PROCEDURE — 74011250636 HC RX REV CODE- 250/636

## 2018-07-25 PROCEDURE — 77030008467 HC STPLR SKN COVD -B: Performed by: ORTHOPAEDIC SURGERY

## 2018-07-25 PROCEDURE — 76060000035 HC ANESTHESIA 2 TO 2.5 HR: Performed by: ORTHOPAEDIC SURGERY

## 2018-07-25 PROCEDURE — 82962 GLUCOSE BLOOD TEST: CPT

## 2018-07-25 PROCEDURE — 77030002912 HC SUT ETHBND J&J -A: Performed by: ORTHOPAEDIC SURGERY

## 2018-07-25 PROCEDURE — 77030007880 HC KT SPN EPDRL BBMI -B: Performed by: NURSE ANESTHETIST, CERTIFIED REGISTERED

## 2018-07-25 PROCEDURE — 77030032490 HC SLV COMPR SCD KNE COVD -B

## 2018-07-25 PROCEDURE — 74011250636 HC RX REV CODE- 250/636: Performed by: ORTHOPAEDIC SURGERY

## 2018-07-25 PROCEDURE — 0SRC0JA REPLACEMENT OF RIGHT KNEE JOINT WITH SYNTHETIC SUBSTITUTE, UNCEMENTED, OPEN APPROACH: ICD-10-PCS | Performed by: ORTHOPAEDIC SURGERY

## 2018-07-25 PROCEDURE — 76210000016 HC OR PH I REC 1 TO 1.5 HR: Performed by: ORTHOPAEDIC SURGERY

## 2018-07-25 PROCEDURE — 99221 1ST HOSP IP/OBS SF/LOW 40: CPT | Performed by: PHYSICAL MEDICINE & REHABILITATION

## 2018-07-25 PROCEDURE — 76010010054 HC POST OP PAIN BLOCK: Performed by: ORTHOPAEDIC SURGERY

## 2018-07-25 PROCEDURE — 77030020263 HC SOL INJ SOD CL0.9% LFCR 1000ML

## 2018-07-25 PROCEDURE — 97110 THERAPEUTIC EXERCISES: CPT

## 2018-07-25 PROCEDURE — 74011000250 HC RX REV CODE- 250: Performed by: PHYSICIAN ASSISTANT

## 2018-07-25 PROCEDURE — 65270000029 HC RM PRIVATE

## 2018-07-25 PROCEDURE — 74011000250 HC RX REV CODE- 250

## 2018-07-25 PROCEDURE — 74011250637 HC RX REV CODE- 250/637: Performed by: PHYSICIAN ASSISTANT

## 2018-07-25 PROCEDURE — 77030037363 HC FEM INST CR  DISP STRY -C: Performed by: ORTHOPAEDIC SURGERY

## 2018-07-25 PROCEDURE — 76942 ECHO GUIDE FOR BIOPSY: CPT | Performed by: ORTHOPAEDIC SURGERY

## 2018-07-25 PROCEDURE — 77030011208: Performed by: ORTHOPAEDIC SURGERY

## 2018-07-25 PROCEDURE — 74011000258 HC RX REV CODE- 258: Performed by: PHYSICIAN ASSISTANT

## 2018-07-25 PROCEDURE — 77030013727 HC IRR FAN PULSVC ZIMM -B: Performed by: ORTHOPAEDIC SURGERY

## 2018-07-25 PROCEDURE — 94762 N-INVAS EAR/PLS OXIMTRY CONT: CPT

## 2018-07-25 PROCEDURE — 77030012935 HC DRSG AQUACEL BMS -B: Performed by: ORTHOPAEDIC SURGERY

## 2018-07-25 PROCEDURE — 77030034696 HC CATH URETH FOL 2W BARD -A: Performed by: ORTHOPAEDIC SURGERY

## 2018-07-25 PROCEDURE — 77030003602 HC NDL NRV BLK BBMI -B: Performed by: NURSE ANESTHETIST, CERTIFIED REGISTERED

## 2018-07-25 PROCEDURE — 86900 BLOOD TYPING SEROLOGIC ABO: CPT | Performed by: PHYSICIAN ASSISTANT

## 2018-07-25 PROCEDURE — 74011250636 HC RX REV CODE- 250/636: Performed by: PHYSICIAN ASSISTANT

## 2018-07-25 PROCEDURE — 74011000302 HC RX REV CODE- 302: Performed by: ORTHOPAEDIC SURGERY

## 2018-07-25 PROCEDURE — 77030006720 HC BLD PAT RMR ZIMM -B: Performed by: ORTHOPAEDIC SURGERY

## 2018-07-25 PROCEDURE — 77030011640 HC PAD GRND REM COVD -A: Performed by: ORTHOPAEDIC SURGERY

## 2018-07-25 PROCEDURE — 76010000162 HC OR TIME 1.5 TO 2 HR INTENSV-TIER 1: Performed by: ORTHOPAEDIC SURGERY

## 2018-07-25 PROCEDURE — 77030002966 HC SUT PDS J&J -A: Performed by: ORTHOPAEDIC SURGERY

## 2018-07-25 PROCEDURE — 77030037364 HC TIB INST CR  DISP STRY -C: Performed by: ORTHOPAEDIC SURGERY

## 2018-07-25 PROCEDURE — 85018 HEMOGLOBIN: CPT | Performed by: PHYSICIAN ASSISTANT

## 2018-07-25 PROCEDURE — 77030019557 HC ELECTRD VES SEAL MEDT -F: Performed by: ORTHOPAEDIC SURGERY

## 2018-07-25 PROCEDURE — 97165 OT EVAL LOW COMPLEX 30 MIN: CPT

## 2018-07-25 PROCEDURE — 77030020782 HC GWN BAIR PAWS FLX 3M -B: Performed by: NURSE ANESTHETIST, CERTIFIED REGISTERED

## 2018-07-25 PROCEDURE — 77030031139 HC SUT VCRL2 J&J -A: Performed by: ORTHOPAEDIC SURGERY

## 2018-07-25 PROCEDURE — C1776 JOINT DEVICE (IMPLANTABLE): HCPCS | Performed by: ORTHOPAEDIC SURGERY

## 2018-07-25 PROCEDURE — 74011250636 HC RX REV CODE- 250/636: Performed by: ANESTHESIOLOGY

## 2018-07-25 PROCEDURE — 77030034849: Performed by: ORTHOPAEDIC SURGERY

## 2018-07-25 PROCEDURE — 36415 COLL VENOUS BLD VENIPUNCTURE: CPT | Performed by: PHYSICIAN ASSISTANT

## 2018-07-25 PROCEDURE — 77030035236 HC SUT PDS STRATFX BARB J&J -B: Performed by: ORTHOPAEDIC SURGERY

## 2018-07-25 PROCEDURE — 77030025452 HC KT TIB SZR TRTH DSP STRY -B: Performed by: ORTHOPAEDIC SURGERY

## 2018-07-25 PROCEDURE — 94760 N-INVAS EAR/PLS OXIMETRY 1: CPT

## 2018-07-25 PROCEDURE — 77030006789 HC BLD SAW OSC STRY -C: Performed by: ORTHOPAEDIC SURGERY

## 2018-07-25 PROCEDURE — 74011000250 HC RX REV CODE- 250: Performed by: ANESTHESIOLOGY

## 2018-07-25 DEVICE — COMPNT FEM CR TRIATHLN 3 R PA --: Type: IMPLANTABLE DEVICE | Site: KNEE | Status: FUNCTIONAL

## 2018-07-25 DEVICE — COMPONENT PAT DIA32MM THK10MM SUPERIOR/INFERIOR KNEE: Type: IMPLANTABLE DEVICE | Site: KNEE | Status: FUNCTIONAL

## 2018-07-25 DEVICE — BASEPLATE TIB SZ 2 AP42MM ML64MM KNEE TRITANIUM 4 CRUCFRM: Type: IMPLANTABLE DEVICE | Site: KNEE | Status: FUNCTIONAL

## 2018-07-25 DEVICE — IMPLANTABLE DEVICE: Type: IMPLANTABLE DEVICE | Site: KNEE | Status: FUNCTIONAL

## 2018-07-25 RX ORDER — ASPIRIN 81 MG/1
81 TABLET ORAL EVERY 12 HOURS
Qty: 70 TAB | Refills: 0 | Status: SHIPPED | OUTPATIENT
Start: 2018-07-25 | End: 2018-08-29

## 2018-07-25 RX ORDER — ONDANSETRON 2 MG/ML
4 INJECTION INTRAMUSCULAR; INTRAVENOUS
Status: DISCONTINUED | OUTPATIENT
Start: 2018-07-25 | End: 2018-07-26 | Stop reason: HOSPADM

## 2018-07-25 RX ORDER — MIDAZOLAM HYDROCHLORIDE 1 MG/ML
2 INJECTION, SOLUTION INTRAMUSCULAR; INTRAVENOUS
Status: COMPLETED | OUTPATIENT
Start: 2018-07-25 | End: 2018-07-25

## 2018-07-25 RX ORDER — ALBUTEROL SULFATE 0.83 MG/ML
2.5 SOLUTION RESPIRATORY (INHALATION)
Status: DISCONTINUED | OUTPATIENT
Start: 2018-07-25 | End: 2018-07-26 | Stop reason: HOSPADM

## 2018-07-25 RX ORDER — FLUTICASONE PROPIONATE 50 MCG
2 SPRAY, SUSPENSION (ML) NASAL
Status: DISCONTINUED | OUTPATIENT
Start: 2018-07-25 | End: 2018-07-26 | Stop reason: HOSPADM

## 2018-07-25 RX ORDER — ATENOLOL 50 MG/1
50 TABLET ORAL DAILY
Status: DISCONTINUED | OUTPATIENT
Start: 2018-07-26 | End: 2018-07-26 | Stop reason: HOSPADM

## 2018-07-25 RX ORDER — FENTANYL CITRATE 50 UG/ML
100 INJECTION, SOLUTION INTRAMUSCULAR; INTRAVENOUS
Status: COMPLETED | OUTPATIENT
Start: 2018-07-25 | End: 2018-07-25

## 2018-07-25 RX ORDER — HYDROMORPHONE HYDROCHLORIDE 2 MG/ML
1 INJECTION, SOLUTION INTRAMUSCULAR; INTRAVENOUS; SUBCUTANEOUS
Status: DISCONTINUED | OUTPATIENT
Start: 2018-07-25 | End: 2018-07-26 | Stop reason: HOSPADM

## 2018-07-25 RX ORDER — SODIUM CHLORIDE, SODIUM LACTATE, POTASSIUM CHLORIDE, CALCIUM CHLORIDE 600; 310; 30; 20 MG/100ML; MG/100ML; MG/100ML; MG/100ML
100 INJECTION, SOLUTION INTRAVENOUS CONTINUOUS
Status: DISCONTINUED | OUTPATIENT
Start: 2018-07-25 | End: 2018-07-25 | Stop reason: HOSPADM

## 2018-07-25 RX ORDER — FLUTICASONE PROPIONATE 110 UG/1
2 AEROSOL, METERED RESPIRATORY (INHALATION) EVERY 12 HOURS
Status: DISCONTINUED | OUTPATIENT
Start: 2018-07-25 | End: 2018-07-26 | Stop reason: HOSPADM

## 2018-07-25 RX ORDER — NALBUPHINE HYDROCHLORIDE 10 MG/ML
5 INJECTION, SOLUTION INTRAMUSCULAR; INTRAVENOUS; SUBCUTANEOUS
Status: DISCONTINUED | OUTPATIENT
Start: 2018-07-25 | End: 2018-07-25 | Stop reason: HOSPADM

## 2018-07-25 RX ORDER — ALBUTEROL SULFATE 2.5 MG/.5ML
2.5 SOLUTION RESPIRATORY (INHALATION) DAILY
Status: DISCONTINUED | OUTPATIENT
Start: 2018-07-26 | End: 2018-07-26 | Stop reason: HOSPADM

## 2018-07-25 RX ORDER — ROPIVACAINE HYDROCHLORIDE 2 MG/ML
INJECTION, SOLUTION EPIDURAL; INFILTRATION; PERINEURAL AS NEEDED
Status: DISCONTINUED | OUTPATIENT
Start: 2018-07-25 | End: 2018-07-25 | Stop reason: HOSPADM

## 2018-07-25 RX ORDER — SODIUM CHLORIDE 9 MG/ML
100 INJECTION, SOLUTION INTRAVENOUS CONTINUOUS
Status: DISCONTINUED | OUTPATIENT
Start: 2018-07-25 | End: 2018-07-26 | Stop reason: HOSPADM

## 2018-07-25 RX ORDER — ACETAMINOPHEN 10 MG/ML
1000 INJECTION, SOLUTION INTRAVENOUS ONCE
Status: COMPLETED | OUTPATIENT
Start: 2018-07-25 | End: 2018-07-25

## 2018-07-25 RX ORDER — NEOMYCIN AND POLYMYXIN B SULFATES 40; 200000 MG/ML; [USP'U]/ML
SOLUTION IRRIGATION AS NEEDED
Status: DISCONTINUED | OUTPATIENT
Start: 2018-07-25 | End: 2018-07-25 | Stop reason: HOSPADM

## 2018-07-25 RX ORDER — FAMOTIDINE 20 MG/1
20 TABLET, FILM COATED ORAL ONCE
Status: COMPLETED | OUTPATIENT
Start: 2018-07-25 | End: 2018-07-25

## 2018-07-25 RX ORDER — SODIUM CHLORIDE 0.9 % (FLUSH) 0.9 %
5-10 SYRINGE (ML) INJECTION AS NEEDED
Status: DISCONTINUED | OUTPATIENT
Start: 2018-07-25 | End: 2018-07-25 | Stop reason: HOSPADM

## 2018-07-25 RX ORDER — VANCOMYCIN 1.75 GRAM/500 ML IN 0.9 % SODIUM CHLORIDE INTRAVENOUS
1750
Status: COMPLETED | OUTPATIENT
Start: 2018-07-25 | End: 2018-07-25

## 2018-07-25 RX ORDER — HYDROMORPHONE HYDROCHLORIDE 2 MG/1
2 TABLET ORAL
Qty: 40 TAB | Refills: 0 | Status: SHIPPED | OUTPATIENT
Start: 2018-07-25 | End: 2018-10-17 | Stop reason: ALTCHOICE

## 2018-07-25 RX ORDER — SODIUM CHLORIDE 0.9 % (FLUSH) 0.9 %
5-10 SYRINGE (ML) INJECTION EVERY 8 HOURS
Status: DISCONTINUED | OUTPATIENT
Start: 2018-07-25 | End: 2018-07-26 | Stop reason: HOSPADM

## 2018-07-25 RX ORDER — SODIUM CHLORIDE, SODIUM LACTATE, POTASSIUM CHLORIDE, CALCIUM CHLORIDE 600; 310; 30; 20 MG/100ML; MG/100ML; MG/100ML; MG/100ML
75 INJECTION, SOLUTION INTRAVENOUS CONTINUOUS
Status: DISCONTINUED | OUTPATIENT
Start: 2018-07-25 | End: 2018-07-25 | Stop reason: HOSPADM

## 2018-07-25 RX ORDER — PROPOFOL 10 MG/ML
INJECTION, EMULSION INTRAVENOUS
Status: DISCONTINUED | OUTPATIENT
Start: 2018-07-25 | End: 2018-07-25 | Stop reason: HOSPADM

## 2018-07-25 RX ORDER — DIPHENHYDRAMINE HCL 25 MG
25 CAPSULE ORAL
Status: DISCONTINUED | OUTPATIENT
Start: 2018-07-25 | End: 2018-07-26 | Stop reason: HOSPADM

## 2018-07-25 RX ORDER — ALPRAZOLAM 0.5 MG/1
0.5 TABLET ORAL
Status: DISCONTINUED | OUTPATIENT
Start: 2018-07-25 | End: 2018-07-26 | Stop reason: HOSPADM

## 2018-07-25 RX ORDER — DEXAMETHASONE SODIUM PHOSPHATE 100 MG/10ML
10 INJECTION INTRAMUSCULAR; INTRAVENOUS ONCE
Status: DISCONTINUED | OUTPATIENT
Start: 2018-07-26 | End: 2018-07-26 | Stop reason: HOSPADM

## 2018-07-25 RX ORDER — OXYCODONE HYDROCHLORIDE 5 MG/1
5 TABLET ORAL
Status: DISCONTINUED | OUTPATIENT
Start: 2018-07-25 | End: 2018-07-25 | Stop reason: HOSPADM

## 2018-07-25 RX ORDER — KETOROLAC TROMETHAMINE 30 MG/ML
INJECTION, SOLUTION INTRAMUSCULAR; INTRAVENOUS AS NEEDED
Status: DISCONTINUED | OUTPATIENT
Start: 2018-07-25 | End: 2018-07-25 | Stop reason: HOSPADM

## 2018-07-25 RX ORDER — AMOXICILLIN 250 MG
2 CAPSULE ORAL DAILY
Status: DISCONTINUED | OUTPATIENT
Start: 2018-07-26 | End: 2018-07-26 | Stop reason: HOSPADM

## 2018-07-25 RX ORDER — DEXAMETHASONE SODIUM PHOSPHATE 4 MG/ML
INJECTION, SOLUTION INTRA-ARTICULAR; INTRALESIONAL; INTRAMUSCULAR; INTRAVENOUS; SOFT TISSUE AS NEEDED
Status: DISCONTINUED | OUTPATIENT
Start: 2018-07-25 | End: 2018-07-25 | Stop reason: HOSPADM

## 2018-07-25 RX ORDER — SODIUM CHLORIDE 0.9 % (FLUSH) 0.9 %
5-10 SYRINGE (ML) INJECTION AS NEEDED
Status: DISCONTINUED | OUTPATIENT
Start: 2018-07-25 | End: 2018-07-26 | Stop reason: HOSPADM

## 2018-07-25 RX ORDER — NALOXONE HYDROCHLORIDE 0.4 MG/ML
.2-.4 INJECTION, SOLUTION INTRAMUSCULAR; INTRAVENOUS; SUBCUTANEOUS
Status: DISCONTINUED | OUTPATIENT
Start: 2018-07-25 | End: 2018-07-26 | Stop reason: HOSPADM

## 2018-07-25 RX ORDER — MIDAZOLAM HYDROCHLORIDE 1 MG/ML
2 INJECTION, SOLUTION INTRAMUSCULAR; INTRAVENOUS
Status: DISCONTINUED | OUTPATIENT
Start: 2018-07-25 | End: 2018-07-25 | Stop reason: HOSPADM

## 2018-07-25 RX ORDER — DICYCLOMINE HYDROCHLORIDE 10 MG/1
20 CAPSULE ORAL
Status: DISCONTINUED | OUTPATIENT
Start: 2018-07-25 | End: 2018-07-26 | Stop reason: HOSPADM

## 2018-07-25 RX ORDER — VENLAFAXINE HYDROCHLORIDE 150 MG/1
300 CAPSULE, EXTENDED RELEASE ORAL DAILY
Status: DISCONTINUED | OUTPATIENT
Start: 2018-07-26 | End: 2018-07-26 | Stop reason: HOSPADM

## 2018-07-25 RX ORDER — HYDROMORPHONE HYDROCHLORIDE 2 MG/ML
0.5 INJECTION, SOLUTION INTRAMUSCULAR; INTRAVENOUS; SUBCUTANEOUS
Status: DISCONTINUED | OUTPATIENT
Start: 2018-07-25 | End: 2018-07-25 | Stop reason: HOSPADM

## 2018-07-25 RX ORDER — HYDROMORPHONE HYDROCHLORIDE 2 MG/1
2 TABLET ORAL
Status: DISCONTINUED | OUTPATIENT
Start: 2018-07-25 | End: 2018-07-26 | Stop reason: HOSPADM

## 2018-07-25 RX ORDER — PANTOPRAZOLE SODIUM 40 MG/1
40 TABLET, DELAYED RELEASE ORAL
Status: DISCONTINUED | OUTPATIENT
Start: 2018-07-26 | End: 2018-07-26 | Stop reason: HOSPADM

## 2018-07-25 RX ORDER — LEVOTHYROXINE SODIUM 75 UG/1
150 TABLET ORAL
Status: DISCONTINUED | OUTPATIENT
Start: 2018-07-26 | End: 2018-07-26 | Stop reason: HOSPADM

## 2018-07-25 RX ORDER — ONDANSETRON 2 MG/ML
INJECTION INTRAMUSCULAR; INTRAVENOUS AS NEEDED
Status: DISCONTINUED | OUTPATIENT
Start: 2018-07-25 | End: 2018-07-25 | Stop reason: HOSPADM

## 2018-07-25 RX ORDER — BUPIVACAINE HYDROCHLORIDE 7.5 MG/ML
INJECTION, SOLUTION INTRASPINAL AS NEEDED
Status: DISCONTINUED | OUTPATIENT
Start: 2018-07-25 | End: 2018-07-25 | Stop reason: HOSPADM

## 2018-07-25 RX ORDER — EPHEDRINE SULFATE 50 MG/ML
INJECTION, SOLUTION INTRAVENOUS AS NEEDED
Status: DISCONTINUED | OUTPATIENT
Start: 2018-07-25 | End: 2018-07-25 | Stop reason: HOSPADM

## 2018-07-25 RX ORDER — FLUMAZENIL 0.1 MG/ML
0.2 INJECTION INTRAVENOUS
Status: DISCONTINUED | OUTPATIENT
Start: 2018-07-25 | End: 2018-07-25 | Stop reason: HOSPADM

## 2018-07-25 RX ORDER — LIDOCAINE HYDROCHLORIDE 10 MG/ML
0.1 INJECTION INFILTRATION; PERINEURAL AS NEEDED
Status: DISCONTINUED | OUTPATIENT
Start: 2018-07-25 | End: 2018-07-25 | Stop reason: HOSPADM

## 2018-07-25 RX ORDER — SODIUM CHLORIDE 0.9 % (FLUSH) 0.9 %
5-10 SYRINGE (ML) INJECTION EVERY 8 HOURS
Status: DISCONTINUED | OUTPATIENT
Start: 2018-07-25 | End: 2018-07-25 | Stop reason: HOSPADM

## 2018-07-25 RX ORDER — LISINOPRIL AND HYDROCHLOROTHIAZIDE 12.5; 2 MG/1; MG/1
1 TABLET ORAL DAILY
Status: DISCONTINUED | OUTPATIENT
Start: 2018-07-26 | End: 2018-07-26 | Stop reason: HOSPADM

## 2018-07-25 RX ORDER — AMITRIPTYLINE HYDROCHLORIDE 25 MG/1
25 TABLET, FILM COATED ORAL
Status: DISCONTINUED | OUTPATIENT
Start: 2018-07-25 | End: 2018-07-26 | Stop reason: HOSPADM

## 2018-07-25 RX ORDER — ASPIRIN 81 MG/1
81 TABLET ORAL EVERY 12 HOURS
Status: DISCONTINUED | OUTPATIENT
Start: 2018-07-25 | End: 2018-07-26 | Stop reason: HOSPADM

## 2018-07-25 RX ORDER — ACETAMINOPHEN 500 MG
1000 TABLET ORAL EVERY 6 HOURS
Status: DISCONTINUED | OUTPATIENT
Start: 2018-07-26 | End: 2018-07-26 | Stop reason: HOSPADM

## 2018-07-25 RX ORDER — CEFAZOLIN SODIUM/WATER 2 G/20 ML
2 SYRINGE (ML) INTRAVENOUS ONCE
Status: DISCONTINUED | OUTPATIENT
Start: 2018-07-25 | End: 2018-07-25

## 2018-07-25 RX ORDER — NALOXONE HYDROCHLORIDE 0.4 MG/ML
0.1 INJECTION, SOLUTION INTRAMUSCULAR; INTRAVENOUS; SUBCUTANEOUS
Status: DISCONTINUED | OUTPATIENT
Start: 2018-07-25 | End: 2018-07-25 | Stop reason: HOSPADM

## 2018-07-25 RX ADMIN — HYDROMORPHONE HYDROCHLORIDE 2 MG: 2 TABLET ORAL at 20:33

## 2018-07-25 RX ADMIN — ASPIRIN 81 MG: 81 TABLET, COATED ORAL at 20:33

## 2018-07-25 RX ADMIN — VANCOMYCIN HYDROCHLORIDE 1.75 G: 10 INJECTION, POWDER, LYOPHILIZED, FOR SOLUTION INTRAVENOUS at 07:29

## 2018-07-25 RX ADMIN — ACETAMINOPHEN 1000 MG: 10 INJECTION, SOLUTION INTRAVENOUS at 20:34

## 2018-07-25 RX ADMIN — EPHEDRINE SULFATE 5 MG: 50 INJECTION, SOLUTION INTRAVENOUS at 08:29

## 2018-07-25 RX ADMIN — HYDROMORPHONE HYDROCHLORIDE 2 MG: 2 TABLET ORAL at 15:54

## 2018-07-25 RX ADMIN — SODIUM CHLORIDE, SODIUM LACTATE, POTASSIUM CHLORIDE, AND CALCIUM CHLORIDE 100 ML/HR: 600; 310; 30; 20 INJECTION, SOLUTION INTRAVENOUS at 06:24

## 2018-07-25 RX ADMIN — SODIUM CHLORIDE, SODIUM LACTATE, POTASSIUM CHLORIDE, AND CALCIUM CHLORIDE: 600; 310; 30; 20 INJECTION, SOLUTION INTRAVENOUS at 07:29

## 2018-07-25 RX ADMIN — EPHEDRINE SULFATE 10 MG: 50 INJECTION, SOLUTION INTRAVENOUS at 08:31

## 2018-07-25 RX ADMIN — HYDROMORPHONE HYDROCHLORIDE 2 MG: 2 TABLET ORAL at 11:59

## 2018-07-25 RX ADMIN — EPHEDRINE SULFATE 5 MG: 50 INJECTION, SOLUTION INTRAVENOUS at 08:02

## 2018-07-25 RX ADMIN — AMITRIPTYLINE HYDROCHLORIDE 25 MG: 25 TABLET, FILM COATED ORAL at 20:34

## 2018-07-25 RX ADMIN — FAMOTIDINE 20 MG: 20 TABLET ORAL at 06:24

## 2018-07-25 RX ADMIN — DEXAMETHASONE SODIUM PHOSPHATE 4 MG: 4 INJECTION, SOLUTION INTRA-ARTICULAR; INTRALESIONAL; INTRAMUSCULAR; INTRAVENOUS; SOFT TISSUE at 07:08

## 2018-07-25 RX ADMIN — TUBERCULIN PURIFIED PROTEIN DERIVATIVE 5 UNITS: 5 INJECTION, SOLUTION INTRADERMAL at 06:24

## 2018-07-25 RX ADMIN — PROPOFOL 50 MCG/KG/MIN: 10 INJECTION, EMULSION INTRAVENOUS at 07:43

## 2018-07-25 RX ADMIN — SODIUM CHLORIDE 100 ML/HR: 900 INJECTION, SOLUTION INTRAVENOUS at 20:40

## 2018-07-25 RX ADMIN — GENTAMICIN SULFATE 380 MG: 40 INJECTION, SOLUTION INTRAMUSCULAR; INTRAVENOUS at 07:17

## 2018-07-25 RX ADMIN — SODIUM CHLORIDE 900 MG: 900 INJECTION, SOLUTION INTRAVENOUS at 15:54

## 2018-07-25 RX ADMIN — BUPIVACAINE HYDROCHLORIDE 1.8 ML: 7.5 INJECTION, SOLUTION INTRASPINAL at 07:43

## 2018-07-25 RX ADMIN — ONDANSETRON 4 MG: 2 INJECTION INTRAMUSCULAR; INTRAVENOUS at 07:43

## 2018-07-25 RX ADMIN — FENTANYL CITRATE 50 MCG: 50 INJECTION INTRAMUSCULAR; INTRAVENOUS at 07:04

## 2018-07-25 RX ADMIN — HYDROMORPHONE HYDROCHLORIDE 1 MG: 2 INJECTION, SOLUTION INTRAMUSCULAR; INTRAVENOUS; SUBCUTANEOUS at 13:48

## 2018-07-25 RX ADMIN — MIDAZOLAM HYDROCHLORIDE 2 MG: 1 INJECTION, SOLUTION INTRAMUSCULAR; INTRAVENOUS at 07:04

## 2018-07-25 RX ADMIN — SODIUM CHLORIDE 100 ML/HR: 900 INJECTION, SOLUTION INTRAVENOUS at 12:00

## 2018-07-25 RX ADMIN — ROPIVACAINE HYDROCHLORIDE 20 ML: 2 INJECTION, SOLUTION EPIDURAL; INFILTRATION; PERINEURAL at 07:08

## 2018-07-25 RX ADMIN — LIDOCAINE HYDROCHLORIDE 0.1 ML: 10 INJECTION, SOLUTION INFILTRATION; PERINEURAL at 06:24

## 2018-07-25 NOTE — PERIOP NOTES
TRANSFER - OUT REPORT:    Verbal report given to Kassie Gonzalez RN on [employer]  being transferred to 3rd floor ortho for routine post - op       Report consisted of patients Situation, Background, Assessment and   Recommendations(SBAR). Information from the following report(s) SBAR, OR Summary, Intake/Output and MAR was reviewed with the receiving nurse. Lines:   Peripheral IV 07/25/18 Left Hand (Active)   Site Assessment Clean, dry, & intact 7/25/2018  5:52 AM   Phlebitis Assessment 0 7/25/2018  5:52 AM   Dressing Status Clean, dry, & intact 7/25/2018  5:52 AM   Dressing Type Transparent;Tape 7/25/2018  5:52 AM   Hub Color/Line Status Pink; Infusing 7/25/2018  5:52 AM   Action Taken Blood drawn 7/25/2018  5:52 AM        Opportunity for questions and clarification was provided.       Patient transported with:   O2 @ 2 liters

## 2018-07-25 NOTE — ANESTHESIA PREPROCEDURE EVALUATION
Anesthetic History   No history of anesthetic complications            Review of Systems / Medical History  Patient summary reviewed and pertinent labs reviewed    Pulmonary        Sleep apnea: No treatment    Asthma : well controlled       Neuro/Psych         Psychiatric history (Depression)     Cardiovascular    Hypertension: well controlled              Exercise tolerance: >4 METS  Comments: Denies recent CP, SOB or Palpitations   GI/Hepatic/Renal     GERD: well controlled      PUD     Endo/Other    Diabetes (pre-diabetes)  Hypothyroidism: well controlled  Morbid obesity and arthritis     Other Findings   Comments: Fibromyalgia  DDD         Physical Exam    Airway  Mallampati: II  TM Distance: 4 - 6 cm  Neck ROM: normal range of motion   Mouth opening: Normal     Cardiovascular  Regular rate and rhythm,  S1 and S2 normal,  no murmur, click, rub, or gallop             Dental      Comments: Multiple chipped teeth upper and lower   Pulmonary  Breath sounds clear to auscultation               Abdominal  GI exam deferred       Other Findings            Anesthetic Plan    ASA: 3  Anesthesia type: spinal      Post-op pain plan if not by surgeon: peripheral nerve block single      Anesthetic plan and risks discussed with: Patient

## 2018-07-25 NOTE — ANESTHESIA PROCEDURE NOTES
Peripheral Block    Start time: 7/25/2018 7:06 AM  End time: 7/25/2018 7:08 AM  Performed by: Dewey Macario  Authorized by: Dewey Macario       Pre-procedure: Indications: at surgeon's request and post-op pain management    Preanesthetic Checklist: patient identified, risks and benefits discussed, site marked, timeout performed, anesthesia consent given and patient being monitored    Timeout Time: 07:04          Block Type:   Block Type:   Adductor canal  Laterality:  Right  Monitoring:  Standard ASA monitoring, continuous pulse ox, frequent vital sign checks, heart rate, responsive to questions and oxygen  Injection Technique:  Single shot  Procedures: ultrasound guided    Patient Position: supine  Prep: chlorhexidine    Location:  Mid thigh  Needle Type:  Stimuplex  Needle Gauge:  22 G  Needle Localization:  Ultrasound guidance  Medication Injected:  0.2%  ropivacaine  Volume (mL):  20  Concentration (%): 4mg.  dexamethasone    Assessment:  Number of attempts:  1  Injection Assessment:  Incremental injection every 5 mL, local visualized surrounding nerve on ultrasound, negative aspiration for CSF, negative aspiration for blood, no paresthesia, no intravascular symptoms and ultrasound image on chart  Patient tolerance:  Patient tolerated the procedure well with no immediate complications

## 2018-07-25 NOTE — PROGRESS NOTES
Problem: Mobility Impaired (Adult and Pediatric)  Goal: *Acute Goals and Plan of Care (Insert Text)  GOALS (1-4 days):  (1.)Ms. Denise will move from supine to sit and sit to supine  in bed with STAND BY ASSIST.  (2.)Ms. Denise will transfer from bed to chair and chair to bed with STAND BY ASSIST using the least restrictive device. (3.)Ms. Denise will ambulate with STAND BY ASSIST for 150 feet with the least restrictive device. (4.)Ms. Denise will ambulate up/down 1 steps with bilateral  railing with CONTACT GUARD ASSIST or with  device. (5.)Ms. Denise will increase right knee ROM to 5°-80°.  ________________________________________________________________________________________________      PHYSICAL THERAPY [facility] tKa: Initial Assessment, PM 7/25/2018  INPATIENT: Hospital Day: 1  Payor: Melchor Ozuna / Plan: [address] [place] [address] HMO / Product Type: "[employer] Care Medicare /      NAME/AGE/GENDER: Tamra Weiner is a 61 y.o. female   PRIMARY DIAGNOSIS:  Primary osteoarthritis of right knee [M17.11]   Procedure(s) and Anesthesia Type:     * RIGHT KNEE ARTHROPLASTY TOTAL / Graylon Luz / FNB - Spinal (Right)  ICD-10: Treatment Diagnosis:    · Pain in Right Knee (M25.561)  · Stiffness of Right Knee, Not elsewhere classified (M25.661)  · Difficulty in walking, Not elsewhere classified (R26.2)      ASSESSMENT:     Ms. Jadene Fabry presents with limited rom and strength of right LE as well as decreased functional mobility and gait s/p right tka. She ambulated in the room with walker and got in the chair and did tka exercises. She plans to go home with HHPT. This section established at most recent assessment   PROBLEM LIST (Impairments causing functional limitations):  1. Decreased Strength  2. Decreased ADL/Functional Activities  3. Decreased Transfer Abilities  4. Decreased Ambulation Ability/Technique  5. Decreased Balance  6. Increased Pain  7. Decreased Activity Tolerance  8.  Decreased Flexibility/Joint Mobility  9. Decreased [place] with Home Exercise Program   INTERVENTIONS PLANNED: (Benefits and precautions of physical therapy have been discussed with the patient.)  1. Bed Mobility  2. Gait Training  3. Home Exercise Program (HEP)  4. Therapeutic Exercise/Strengthening  5. Transfer Training  6. Range of Motion: active/assisted/passive  7. Therapeutic Activities  8. Group Therapy     TREATMENT PLAN: Frequency/Duration: Follow patient BID for duration of hospital stay to address above goals. Rehabilitation Potential For Stated Goals: Good     RECOMMENDED REHABILITATION/EQUIPMENT: (at time of discharge pending progress): Continue Skilled Therapy and Home Health: Physical Therapy. HISTORY:   History of Present Injury/Illness (Reason for Referral):  S/p right tka  Past Medical History/Comorbidities:   Ms. Juwan Mccain  has a past medical history of Adverse effect of anesthesia; Allergic rhinitis; Asthma; Autoimmune disease ([address] DDD (degenerative disc disease), lumbar; Depression; Diabetes ([address] Diverticulitis (2016); Fibromyalgia; Fracture (2016); GERD (gastroesophageal reflux disease); Herpes genitalis in women; Hypertension; IBS (irritable bowel syndrome); Morbid obesity ([address] Primary hypothyroidism; PUD (peptic ulcer disease); Sleep apnea; and Status post right knee replacement (7/25/2018). She also has no past medical history of Aneurysm ([address] Arrhythmia; CAD (coronary artery disease); Cancer ([address] Chronic kidney disease; Chronic obstructive pulmonary disease ([address] Chronic pain; Coagulation disorder ([address] Difficult intubation; Endocarditis; Heart failure ([address] Liver disease; Malignant hyperthermia due to anesthesia; Nausea & vomiting; Nicotine vapor product user; Non-nicotine vapor product user; Pseudocholinesterase deficiency; Rheumatic fever; Seizures ([address] Stroke [facility]); or Thromboembolus ([address]   Ms. Juwan Mccain  has a past surgical history that includes hx hysterectomy; hx tubal ligation; hx colonoscopy (2016); and hx endoscopy. Social History/Living Environment:   Home Environment: Private residence  # Steps to Enter: 1  One/Two Story Residence: Two story  # of Interior Steps: 12  Interior Rails: Left  Lift Chair Available: No  Living Alone: No  Support Systems: Family member(s)  Patient Expects to be Discharged to[de-identified] Private residence  Current DME Used/Available at Home: Cane, straight  Prior Level of Function/Work/Activity:  Independent, cane with gait   Number of Personal Factors/Comorbidities that affect the Plan of Care: 3+: HIGH COMPLEXITY   EXAMINATION:   Most Recent Physical Functioning:      Gross Assessment  AROM: Within functional limits (left LE)  Strength: Generally decreased, functional (left LE)        RLE AROM  R Knee Flexion: 60  R Knee Extension: 10            Bed Mobility  Supine to Sit: Contact guard assistance    Transfers  Sit to Stand: Minimum assistance  Stand to Sit: Minimum assistance  Bed to Chair: Minimum assistance    Balance  Sitting: Intact  Standing: Pull to stand; With support              Weight Bearing Status  Right Side Weight Bearing: As tolerated  Distance (ft): 15 Feet (ft)  Ambulation - Level of Assistance: Additional time;Minimal assistance  Assistive Device: Walker, rolling  Speed/Sheila: Delayed  Step Length: Left shortened;Right shortened  Stance: Right decreased  Gait Abnormalities: Antalgic;Trunk sway increased  Interventions: Manual cues; Safety awareness training; Tactile cues; Verbal cues     Braces/Orthotics:     Right Knee Cold  Type: Cryocuff      Body Structures Involved:  1. Bones  2. Joints  3. Muscles  4. Ligaments Body Functions Affected:  1. Movement Related Activities and Participation Affected:  1.  Mobility   Number of elements that affect the Plan of Care: 3: MODERATE COMPLEXITY   CLINICAL PRESENTATION:   Presentation: Stable and uncomplicated: LOW COMPLEXITY   CLINICAL DECISION MAKIN [facility] [id number] AM-PAC 3 Clicks   Basic Mobility Inpatient Short Form  How much difficulty does the patient currently have. .. Unable A Lot A Little None   1. Turning over in bed (including adjusting bedclothes, sheets and blankets)? [] 1   [] 2   [x] 3   [] 4   2. Sitting down on and standing up from a chair with arms ( e.g., wheelchair, bedside commode, etc.)   [] 1   [] 2   [x] 3   [] 4   3. Moving from lying on back to sitting on the side of the bed? [] 1   [] 2   [x] 3   [] 4   How much help from another person does the patient currently need. .. Total A Lot A Little None   4. Moving to and from a bed to a chair (including a wheelchair)? [] 1   [] 2   [x] 3   [] 4   5. Need to walk in hospital room? [] 1   [] 2   [x] 3   [] 4   6. Climbing 3-5 steps with a railing? [] 1   [] 2   [x] 3   [] 4   © 2007, Trustees of [facility] [employer], under license to [employer]. All rights reserved        Score:  Initial: 18 Most Recent: X (Date: -- )    Interpretation of Tool:  Represents activities that are increasingly more difficult (i.e. Bed mobility, Transfers, Gait). Score 24 23 22-20 19-15 14-10 9-7 6     Modifier CH CI CJ CK CL CM CN      ? Mobility - Walking and Moving Around:     - CURRENT STATUS: CK - 40%-59% impaired, limited or restricted    - GOAL STATUS: CJ - 20%-39% impaired, limited or restricted    - D/C STATUS:  ---------------To be determined---------------  Payor: Leah Gurrolaa / Plan: [address] [place] [address] HMO / Product Type: Managed Care Medicare /      Medical Necessity:     · Patient is expected to demonstrate progress in strength, range of motion and balance to decrease assistance required with theraputic exercises and functional mobility. Reason for Services/Other Comments:  · Patient continues to require present interventions due to patient's inability to perform theraputic exercises and functional mobility independently.    Use of outcome tool(s) and clinical judgement create a POC that gives a: Clear prediction of patient's progress: LOW COMPLEXITY            TREATMENT:   (In addition to Assessment/Re-Assessment sessions the following treatments were rendered)     Pre-treatment Symptoms/Complaints:  Knee sore  Pain: Initial:      Post Session:  3     Therapeutic Exercise: (10 Minutes):  Exercises per grid below to improve mobility and strength. Required minimal visual, verbal and manual cues to promote proper body alignment, promote proper body posture and promote proper body mechanics. Progressed range and repetitions as indicated. Date:  7/25 Date:   Date:     ACTIVITY/EXERCISE AM PM AM PM AM PM   GROUP THERAPY  []  []  []  []  []  []   Ankle Pumps  10a       Quad Sets  10a       Gluteal Sets  10a       Hip ABd/ADduction  10a       Straight Leg Raises         Knee Slides  10a       Short Arc Quads         Long Arc Quads         Chair Slides                  B = bilateral; AA = active assistive; A = active; P = passive      Treatment/Session Assessment:     Response to Treatment:  Pt. Did well. Education:  [x] Home Exercises  [x] Fall Precautions  [] Hip Precautions [] D/C Instruction Review  [x] Knee/Hip Prosthesis Review  [x] Walker Management/Safety [] Adaptive Equipment as Needed       Interdisciplinary Collaboration:   o Occupational Therapist  o Registered Nurse    After treatment position/precautions:   o Up in chair  o Bed/Chair-wheels locked  o Bed in low position  o Call light within reach    Compliance with Program/Exercises: Will assess as treatment progresses. No questions. Recommendations/Intent for next treatment session:  Treatment next visit will focus on increasing Ms. Denise's independence with bed mobility, transfers, gait training, strength/ROM exercises, modalities for pain, and patient education.       Total Treatment Duration:  PT Patient Time In/Time Out  Time In: 1320  Time Out: 2882 Jose Luis Barragan, PT

## 2018-07-25 NOTE — CONSULTS
Hospitalist Note     Admit Date:  2018 12:06 AM   Name:  Desiree Lopez   Age:  61 y.o.  :  1955   MRN:  [id number]   PCP:  Mane Jordan MD  Treatment Team: Attending Provider: Yemi Ivey MD; Consulting Provider: Francisco Javier Mayes MD; Consulting Provider: Nadya Garg MD; Consulting Provider: Zuri Cardoza MD; Care Manager: LOLY Dempsey      Subjective:   Hospitalists consulted for assistance with management. Pt has a hx of HTN. BP controlled.   No acute IM needs identified      Objective:   Patient Vitals for the past 24 hrs:   Temp Pulse Resp BP SpO2   18 1052 96 °F (35.6 °C) 72 18 109/70 98 %   18 1024 - 70 15 108/56 100 %   18 1009 - 70 16 115/51 100 %   18 0954 - 68 16 96/53 100 %   18 0939 - 71 17 110/48 99 %   18 0934 - 69 16 (!) 84/53 96 %   18 0929 - 74 16 92/50 98 %   18 0924 98.6 °F (37 °C) 69 14 (!) 75/49 98 %   18 0723 - 74 18 127/62 100 %   18 0718 - 72 18 122/58 100 %   18 0713 - 68 18 131/57 100 %   18 0708 - 73 18 143/65 100 %   18 0702 - 70 20 157/77 100 %   18 0651 - 68 18 132/67 98 %   18 0537 97.5 °F (36.4 °C) 85 16 103/60 96 %     Oxygen Therapy  O2 Sat (%): 98 % (18 1052)  O2 Device: Nasal cannula (18 0954)  O2 Flow Rate (L/min): 2 l/min (18 1024)    Intake/Output Summary (Last 24 hours) at 18 1152  Last data filed at 18 1029   Gross per 24 hour   Intake             2000 ml   Output              425 ml   Net             1575 ml         Current Meds:  Current Facility-Administered Medications   Medication Dose Route Frequency    albuterol (PROVENTIL VENTOLIN) nebulizer solution 2.5 mg  2.5 mg Nebulization Q6H PRN    tuberculin injection 5 Units  5 Units IntraDERMal ONCE    [START ON 2018] albuterol CONCENTRATE 2.5mg/0.5 mL neb soln  2.5 mg Inhalation DAILY    ALPRAZolam (XANAX) tablet 0.5 mg  0.5 mg Oral DAILY PRN    amitriptyline (ELAVIL) tablet 25 mg  25 mg Oral QHS    [START ON 7/26/2018] atenolol (TENORMIN) tablet 50 mg  50 mg Oral DAILY    . PHARMACY TO SUBSTITUTE PER PROTOCOL (Reordered from: cyanocobalamin, vitamin B-12, (VITAMIN B-12 PO))    Per Protocol    fluticasone (FLONASE) 50 mcg/actuation nasal spray 2 Spray  2 Spray Both Nostrils QHS    fluticasone (FLOVENT HFA) 110 mcg inhaler (Patient Supplied)  2 Puff Inhalation Q12H    [START ON 7/26/2018] lisinopril-hydroCHLOROthiazide (PRINZIDE, ZESTORETIC) 20-12.5 mg per tablet 1 Tab  1 Tab Oral DAILY    [START ON 7/26/2018] levothyroxine (SYNTHROID) tablet 150 mcg  150 mcg Oral ACB    [START ON 7/26/2018] pantoprazole (PROTONIX) tablet 40 mg  40 mg Oral ACB    venlafaxine-SR (EFFEXOR-XR) capsule 300 mg  300 mg Oral DAILY    dicyclomine (BENTYL) capsule 20 mg  20 mg Oral BID PRN    0.9% sodium chloride infusion  100 mL/hr IntraVENous CONTINUOUS    sodium chloride (NS) flush 5-10 mL  5-10 mL IntraVENous Q8H    sodium chloride (NS) flush 5-10 mL  5-10 mL IntraVENous PRN    acetaminophen (OFIRMEV) infusion 1,000 mg  1,000 mg IntraVENous ONCE    [START ON 7/26/2018] acetaminophen (TYLENOL) tablet 1,000 mg  1,000 mg Oral Q6H    HYDROmorphone (DILAUDID) tablet 2 mg  2 mg Oral Q4H PRN    HYDROmorphone (PF) (DILAUDID) injection 1 mg  1 mg IntraVENous Q3H PRN    naloxone (NARCAN) injection 0.2-0.4 mg  0.2-0.4 mg IntraVENous Q10MIN PRN    [START ON 7/26/2018] dexamethasone (DECADRON) injection 10 mg  10 mg IntraVENous ONCE    ondansetron (ZOFRAN) injection 4 mg  4 mg IntraVENous Q4H PRN    diphenhydrAMINE (BENADRYL) capsule 25 mg  25 mg Oral Q4H PRN    [START ON 7/26/2018] senna-docusate (PERICOLACE) 8.6-50 mg per tablet 2 Tab  2 Tab Oral DAILY    aspirin delayed-release tablet 81 mg  81 mg Oral Q12H    clindamycin phosphate (CLEOCIN) 900 mg in 0.9% sodium chloride 100 mL IVPB  900 mg IntraVENous Q8H       Data Review:   Recent Results (from the past 24 hour(s))   TYPE & SCREEN    Collection Time: 07/25/18  6:01 AM   Result Value Ref Range    Crossmatch Expiration 07/28/2018     ABO/Rh(D) [place] Prost NEGATIVE     Antibody screen NEG    GLUCOSE, POC    Collection Time: 07/25/18  6:07 AM   Result Value Ref Range    Glucose (POC) 95 65 - 100 mg/dL       All Micro Results     None          Other Studies:  No results found. Assessment and Plan:     Hospital Problems as of 7/25/2018  Date Reviewed: 6/27/2018          Codes Class Noted - Resolved POA    * (Principal)Status post right knee replacement ICD-10-CM: I91.086  ICD-9-CM: V43.65  7/25/2018 - Present No        Osteoarthritis of right knee ICD-10-CM: M17.11  ICD-9-CM: 715.96  10/11/2017 - Present Yes              PLAN:  · Agree with current treatment plan as per primary team.  · Cont home HTN meds, start tomorrow. · Nothing acute going on from our standpoint, but nursing may call us with any issues. · No charge billed to the patient for this. Thank you.     Signed:  Aisha Cool MD

## 2018-07-25 NOTE — ANESTHESIA POSTPROCEDURE EVALUATION
Post-Anesthesia Evaluation and Assessment    Patient: Radha Holt MRN: [id number]  SSN: [id number]    YOB: 1955  Age: 61 y.o. Sex: female       Cardiovascular Function/Vital Signs  Visit Vitals    BP 96/53    Pulse 68    Temp 37 °C (98.6 °F)    Resp 16    Ht 5' 3\" (1.6 m)    Wt 111.2 kg (245 lb 4 oz)    SpO2 100%    BMI 43.44 kg/m2       Patient is status post spinal anesthesia for Procedure(s):  RIGHT KNEE ARTHROPLASTY TOTAL / DARIAN / FNB. Nausea/Vomiting: None    Postoperative hydration reviewed and adequate. Pain:  Pain Scale 1: Visual (07/25/18 0708)  Pain Intensity 1: 0 (07/25/18 0708)   Managed    Neurological Status:   Neuro (WDL): Within Defined Limits (07/25/18 0551)   LE pharmacologically paralyzed    Mental Status and Level of Consciousness: Arousable    Pulmonary Status:   O2 Device: Nasal cannula (07/25/18 0939)   Adequate oxygenation and airway patent    Complications related to anesthesia: None    Post-anesthesia assessment completed.  No concerns    Signed By: Adrian Reddy MD     July 25, 2018

## 2018-07-25 NOTE — PERIOP NOTES
Pt allergic to Keflex with hives. She states she only takes a Z-rogerio when needs something due to her allergies. Spoke with Dawn Kimble at [facility] regarding dosing pt for [employer]. Spoke with Rick Hawk (nursing supervisor) about getting Vanco for me when dosing completed. Await Dawn Kimble in pharmacy to dose pt.

## 2018-07-25 NOTE — IP AVS SNAPSHOT
[address] 
[phone] Patient: Yareli Garay MRN: [id number] NXA:[id number] About your hospitalization You were admitted on:  July 25, 2018 You last received care in the:  [facility] You were discharged on:  July 26, 2018 Why you were hospitalized Your primary diagnosis was:  Status Post Right Knee Replacement Your diagnoses also included:  Osteoarthritis Of Right Knee Follow-up Information Follow up With Details Comments Contact Info Jose Beasley MD  As needed soniavenaomi [address] [id number] [facility] [address] 
[phone] Nataliia Bautista MD  As scheduled by office [address] [address] 
[phone] Discharge Orders Procedure Order Date Status Priority Quantity Spec Type Associated Dx CALL YOUR DOCTOR For: Temperature greater than 100.4., Severe uncontrolled pain. , Persistant nausea and vomiting., Persistant dizziness or light-headedness. , Hives, Difficulty breathing, headache, or visual disturbances. , Redness, tenderness, or s. .. 07/25/18 1441 Normal Routine 1  Status post right knee replacement [[id number]] Questions: For:  Temperature greater than 100.4. For:  Severe uncontrolled pain. For:  Persistant nausea and vomiting. For:  Persistant dizziness or light-headedness. For:  Delene Ditch For:  Difficulty breathing, headache, or visual disturbances. For:  Redness, tenderness, or signs of infection. ACTIVITY AFTER DISCHARGE Patient should: Restrict driving, Restrict lifting, Other (specify) 07/25/18 1441 Normal Routine 1  Status post right knee replacement [[id number]] Questions: Patient should:  Restrict driving Patient should:  Restrict lifting Patient should: Other (specify)  DRESSING, CHANGE SPECIFY 07/25/18 1441 Normal Routine 1  Status post right knee replacement [[id number]] Comments:  Routine dressing changes. Notify if excessive drainage. If staples are present they are to be removed 10 days post surgery and steri strips applied. [address] [facility] [place], [place] 07/25/18 1441 Normal Routine 1  Status post right knee replacement [[id number]] REFERRAL TO PHYSICAL THERAPY 07/25/18 1441 Normal Routine 1  Status post right knee replacement [[id number]] Comments:  Referral to Home PT A check maryse indicates which time of day the medication should be taken. My Medications START taking these medications Instructions Each Dose to Equal  
 Morning Noon Evening Bedtime  
 aspirin delayed-release 81 mg tablet Your next dose is: Today Take 1 Tab by mouth every twelve (12) hours every twelve (12) hours for 35 days. 81 mg HYDROmorphone 2 mg tablet Commonly known as:  DILAUDID Your next dose is: Today @ 2:30 pm, if needed Take 1 Tab by mouth every four (4) hours as needed. Max Daily Amount: 12 mg.  
 2 mg CHANGE how you take these medications Instructions Each Dose to Equal  
 Morning Noon Evening Bedtime  
 albuterol 90 mcg/actuation inhaler Commonly known as:  PROVENTIL HFA, VENTOLIN HFA, PROAIR HFA What changed:   
- when to take this 
- additional instructions Your next dose is: Today Take 2 Puffs by inhalation every six (6) hours as needed. 2 Puff ALPRAZolam 0.5 mg tablet Commonly known as:  Carletha Puller What changed:  additional instructions Your next dose is: Today Take 1 Tab by mouth daily as needed for Anxiety. 0.5 mg  
    
   
   
  
   
  
 amitriptyline 25 mg tablet Commonly known as:  ELAVIL What changed:  additional instructions Your next dose is: Today Take 1 Tab by mouth nightly as needed for Sleep. 25 mg  
    
   
   
   
  
  
 atenolol 50 mg tablet Commonly known as:  TENORMIN What changed:   
- how much to take 
- how to take this - when to take this 
- additional instructions Your next dose is:  Tomorrow TAKE 1 TABLET EVERY DAY  
     
  
   
   
   
  
 * fluticasone 110 mcg/actuation inhaler Commonly known as:  FLOVENT HFA What changed:  Another medication with the same name was changed. Make sure you understand how and when to take each. Your next dose is: Today Take 2 Puffs by inhalation every twelve (12) hours. 2 Puff * fluticasone 50 mcg/actuation nasal spray Commonly known as:  Earle Desiree What changed:  additional instructions Your next dose is:  Tomorrow 2 Sprays by Both Nostrils route daily. 2 Spray  
    
  
   
   
   
  
 levothyroxine 150 mcg tablet Commonly known as:  SYNTHROID What changed:  additional instructions Your next dose is:  Tomorrow Take 1 Tab by mouth Daily (before breakfast). 150 mcg  
    
  
   
   
   
  
 loratadine 10 mg tablet Commonly known as:  Fabiola Tom What changed:  additional instructions Your next dose is:  Tomorrow Take 1 Tab by mouth daily. 10 mg  
    
  
   
   
   
  
 omeprazole 20 mg capsule Commonly known as:  PRILOSEC What changed:  when to take this Your next dose is:  Tomorrow Take 1 Cap by mouth daily. 20 mg  
    
  
   
   
   
  
 venlafaxine- mg capsule Commonly known as:  EFFEXOR-XR What changed:  additional instructions Your next dose is:  Tomorrow Take 2 Caps by mouth daily. 300 mg * Notice: This list has 2 medication(s) that are the same as other medications prescribed for you. Read the directions carefully, and ask your doctor or other care provider to review them with you. CONTINUE taking these medications Instructions Each Dose to Equal  
 Morning Noon Evening Bedtime Blood-Glucose Meter monitoring kit Use as instructed. Patient's choice of glucometer. Dx: E11.9  
     
   
   
   
  
 dicyclomine 20 mg tablet Commonly known as:  BENTYL Your next dose is: Today Take 20 mg by mouth as needed. For diarrhea  
 20 mg  
    
   
   
  
   
  
 glucose blood VI test strips strip Commonly known as:  ASCENSIA AUTODISC VI, ONE TOUCH ULTRA TEST VI  
   
 by Does Not Apply route. Lancets Misc  
   
 by Does Not Apply route. lisinopril-hydroCHLOROthiazide 20-12.5 mg per tablet Commonly known as:  Birder Haste Your next dose is:  Tomorrow Take 1 Tab by mouth daily. 1 Tab TYLENOL EXTRA STRENGTH 500 mg tablet Generic drug:  acetaminophen Your next dose is: Today Take 500 mg by mouth every six (6) hours as needed for Pain. Indications: Pain 500 mg  
    
   
   
  
   
  
 VITAMIN B-12 PO Your next dose is:  Tomorrow Take  by mouth daily. VITAMIN D3 5,000 unit Tab tablet Generic drug:  cholecalciferol (VITAMIN D3) Your next dose is:  Tomorrow Take 5,000 Units by mouth daily. Indications: PREVENTION OF VITAMIN D DEFICIENCY  
 5000 Units STOP taking these medications   
 meloxicam 15 mg tablet Commonly known as:  MOBIC Where to Get Your Medications Information on where to get these meds will be given to you by the nurse or doctor. ! Ask your nurse or doctor about these medications  
  aspirin delayed-release 81 mg tablet HYDROmorphone 2 mg tablet Opioid Education Prescription Opioids: What You Need to Know: 
 
Prescription opioids can be used to help relieve moderate-to-severe pain and are often prescribed following a surgery or injury, or for certain health conditions. These medications can be an important part of treatment but also come with serious risks.   Opioids are strong pain medicines. Examples include hydrocodone, oxycodone, fentanyl, and morphine. Heroin is an example of an illegal opioid. It is important to work with your health care provider to make sure you are getting the safest, most effective care. WHAT ARE THE RISKS AND SIDE EFFECTS OF OPIOID USE? Prescription opioids carry serious risks of addiction and overdose, especially with prolonged use. An opioid overdose, often marked by slow breathing, can cause sudden death. The use of prescription opioids can have a number of side effects as well, even when taken as directed. · Tolerance-meaning you might need to take more of a medication for the same pain relief · Physical dependence-meaning you have symptoms of withdrawal when the medication is stopped. Withdrawal symptoms can include nausea, sweating, chills, diarrhea, stomach cramps, and muscle aches. Withdrawal can last up to several weeks, depending on which drug you took and how long you took it. · Increased sensitivity to pain · Constipation · Nausea, vomiting, and dry mouth · Sleepiness and dizziness · Confusion · Depression · Low levels of testosterone that can result in lower sex drive, energy, and strength · Itching and sweating RISKS ARE GREATER WITH:      
· History of drug misuse, substance use disorder, or overdose · Mental health conditions (such as depression or anxiety) · Sleep apnea · Older age (72 years or older) · Pregnancy Avoid alcohol while taking prescription opioids. Also, unless specifically advised by your health care provider, medications to avoid include: · Benzodiazepines (such as Xanax or Valium) · Muscle relaxants (such as Soma or Flexeril) · Hypnotics (such as Ambien or Lunesta) · Other prescription opioids KNOW YOUR OPTIONS Talk to your health care provider about ways to manage your pain that don't involve prescription opioids.   Some of these options may actually work better and have fewer risks and side effects. Options may include: 
· Pain relievers such as acetaminophen, ibuprofen, and naproxen · Some medications that are also used for depression or seizures · Physical therapy and exercise · Counseling to help patients learn how to cope better with triggers of pain and stress. · Application of heat or cold compress · Massage therapy · Relaxation techniques Be Informed Make sure you know the name of your medication, how much and how often to take it, and its potential risks & side effects. IF YOU ARE PRESCRIBED OPIOIDS FOR PAIN: 
· Never take opioids in greater amounts or more often than prescribed. Remember the goal is not to be pain-free but to manage your pain at a tolerable level. · Follow up with your primary care provider to: · Work together to create a plan on how to manage your pain. · Talk about ways to help manage your pain that don't involve prescription opioids. · Talk about any and all concerns and side effects. · Help prevent misuse and abuse. · Never sell or share prescription opioids · Help prevent misuse and abuse. · Store prescription opioids in a secure place and out of reach of others (this may include visitors, children, friends, and family). · Safely dispose of unused/unwanted prescription opioids: Find your community drug take-back program or your pharmacy mail-back program, or flush them down the toilet, following guidance from the Food and Drug Administration (www.fda.gov/Drugs/ResourcesForYou). · Visit www.cdc.gov/drugoverdose to learn about the risks of opioid abuse and overdose. · If you believe you may be struggling with addiction, tell your health care provider and ask for guidance or call [employer] at 8-572-582-WJGC. Discharge Instructions [facility] Patient Discharge Instructions Lori Monsivais / [id number] : 1955 Admitted 2018 Discharged: 2018 IF YOU HAVE ANY PROBLEMS ONCE YOU ARE AT HOME CALL THE FOLLOWING NUMBERS:  
Main office number: [phone] Medications · The medications you are to continue on are listed on the medication reconciliation sheet. · Narcotic pain medications as well as supplemental iron can cause constipation. If this occurs try stopping the narcotic pain medication and/or the iron. · It is important that you take the medication exactly as they are prescribed. · Medications which increase your risk of blood clots are listed to stop for 5 weeks after surgery as well as medications or supplements which increase your risk of bleeding complications. · Keep your medication in the bottles provided by the pharmacist and keep a list of the medication names, dosages, and times to be taken in your wallet. · Do not take other medications without consulting your doctor. Important Information Do NOT smoke as this will greatly increase your risk of infection! Resume your prehospital diet. If you have excessive nausea or vomitting call your doctor's office Leg swelling and warmth is normal for 6 months after surgery. If you experience swelling in your leg elevate you leg while laying down with your toes above your heart. If you have sudden onset severe swelling with leg pain call our office. The stitches deep inside take approximately 6 months to dissolve. There will be sharp shooting, stinging and burning pain. This is normal and will resolve between 3-6 months after surgery. Difficulty sleeping is normal following total Knee and Hip replacement. You may try melatonin, an over-the-counter sleep aid or benadryl to help with sleep. Most patients will resume sleeping through the night 8 weeks after surgery. Home Physical Therapy is arranged.  Home Health will contact you within 48 hrs of discharge that you have chosen. If you have not received a call within this time frame please contact that provider you chose. You should be given this information before you leave the hospital.  
 
You are at a risk for falls. Use the rolling walker when walking. Patients who have had a joint replacement should not drive if they are still taking narcotic pain mediation during the daytime hours. Most patients wean themselves off of pain medication within 2-5 weeks after surgery. When to Call the office - If you have a temperature greater then 101 
- Uncontrolled vomiting - Loose control of your bladder or bowel function - Are unable to bear any weight  
- Need a pain medication refill DISCHARGE SUMMARY from Nurse The following personal items collected during your admission are returned to you:  
Dental Appliance: Dental Appliances: None Vision: Visual Aid: Glasses Hearing Aid:   na 
Jewelry: Jewelry: None Clothing:   self Other Valuables: Other Valuables: Cell Phone (and ID with Tori Mullen) Valuables sent to safe:   na 
 
PATIENT INSTRUCTIONS: 
 
After general anesthesia or intravenous sedation, for 24 hours or while taking prescription Narcotics: · Limit your activities · Do not drive and operate hazardous machinery · Do not make important personal or business decisions · Do  not drink alcoholic beverages · If you have not urinated within 8 hours after discharge, please contact your surgeon on call. Report the following to your surgeon: 
· Excessive pain, swelling, redness or odor of or around the surgical area · Temperature over 101 · Nausea and vomiting lasting longer than 4 hours or if unable to take medications · Any signs of decreased circulation or nerve impairment to extremity: change in color, persistent  numbness, tingling, coldness or increase pain · Any questions, call office @ [phone] Keep scheduled follow up appointment. If need to change, call office @ [phone]. *  Please give a list of your current medications to your Primary Care Provider. *  Please update this list whenever your medications are discontinued, doses are 
    changed, or new medications (including over-the-counter products) are added. *  Please carry medication information at all times in case of emergency situations. Total Knee Replacement: What to Expect at Home Your Recovery When you leave the hospital, you should be able to move around with a walker or crutches. But you will need someone to help you at home for the next few weeks or until you have more energy and can move around better. If there is no one to help you at home, you may go to a rehabilitation center. You will go home with a bandage and stitches, staples, tissue glue, or tape strips. Change the bandage as your doctor tells you to. If you have stitches or staples, your doctor will remove them 10 to 21 days after your surgery. Glue or tape strips will fall off on their own over time. You may still have some mild pain, and the area may be swollen for 3 to 6 months after surgery. Your knee will continue to improve for 6 to 12 months. You will probably use a walker for 1 to 3 weeks and then use crutches. When you are ready, you can use a cane. You will probably be able to walk on your own in 4 to 8 weeks. You will need to do months of physical rehabilitation (rehab) after a knee replacement. Rehab will help you strengthen the muscles of the knee and help you regain movement. After you recover, your artificial knee will allow you to do normal daily activities with less pain or no pain at all. You may be able to hike, dance, ride a bike, and play golf. Talk to your doctor about whether you can do more strenuous activities. Always tell your caregivers that you have an artificial knee. How long it will take to walk on your own, return to normal activities, and go back to work depends on your health and how well your rehabilitation (rehab) program goes. The better you do with your rehab exercises, the quicker you will get your strength and movement back. This care sheet gives you a general idea about how long it will take for you to recover. But each person recovers at a different pace. Follow the steps below to get better as quickly as possible. How can you care for yourself at home? Activity 
  · Rest when you feel tired. You may take a nap, but do not stay in bed all day. When you sit, use a chair with arms. You can use the arms to help you stand up.  
  · Work with your physical therapist to find the best way to exercise. You may be able to take frequent, short walks using crutches or a walker. What you can do as your knee heals will depend on whether your new knee is cemented or uncemented. You may not be able to do certain things for a while if your new knee is uncemented.  
  · After your knee has healed enough, you can do more strenuous activities with caution. ¨ You can golf, but use a golf cart, and do not wear shoes with spikes. ¨ You can bike on a flat road or on a stationary bike. Avoid biking up hills. ¨ Your doctor may suggest that you stay away from activities that put stress on your knee. These include tennis or badminton, squash or racquetball, contact sports like football, jumping (such as in basketball), jogging, or running. ¨ Avoid activities where you might fall. These include horseback riding, skiing, and mountain biking.  
  · Do not sit for more than 1 hour at a time. Get up and walk around for a while before you sit again. If you must sit for a long time, prop up your leg with a chair or footstool. This will help you avoid swelling.  
  · Ask your doctor when you can drive again.  It may take up to 8 weeks after knee replacement surgery before it is safe for you to drive.  
  · When you get into a car, sit on the edge of the seat. Then pull in your legs, and turn to face the front.  
  · You should be able to do many everyday activities 3 to 6 weeks after your surgery. You will probably need to take 4 to 16 weeks off from work. When you can go back to work depends on the type of work you do and how you feel.  
  · Ask your doctor when it is okay for you to have sex.  
  · Do not lift anything heavier than 10 pounds and do not lift weights for 12 weeks. Diet 
  · By the time you leave the hospital, you should be eating your normal diet. If your stomach is upset, try bland, low-fat foods like plain rice, broiled chicken, toast, and yogurt. Your doctor may suggest that you take iron and vitamin supplements.  
  · Drink plenty of fluids (unless your doctor tells you not to).   · Eat healthy foods, and watch your portion sizes. Try to stay at your ideal weight. Too much weight puts more stress on your new knee.  
  · You may notice that your bowel movements are not regular right after your surgery. This is common. Try to avoid constipation and straining with bowel movements. You may want to take a fiber supplement every day. If you have not had a bowel movement after a couple of days, ask your doctor about taking a mild laxative. Medicines 
  · Your doctor will tell you if and when you can restart your medicines. He or she will also give you instructions about taking any new medicines.  
  · If you take blood thinners, such as warfarin (Coumadin), clopidogrel (Plavix), or aspirin, be sure to talk to your doctor. He or she will tell you if and when to start taking those medicines again. Make sure that you understand exactly what your doctor wants you to do.  
  · Your doctor may give you a blood-thinning medicine to prevent blood clots.  If you take a blood thinner, be sure you get instructions about how to take your medicine safely. Blood thinners can cause serious bleeding problems. This medicine could be in pill form or as a shot (injection). If a shot is necessary, your doctor will tell you how to do this.  
  · Be safe with medicines. Take pain medicines exactly as directed. ¨ If the doctor gave you a prescription medicine for pain, take it as prescribed. ¨ If you are not taking a prescription pain medicine, ask your doctor if you can take an over-the-counter medicine. ¨ Plan to take your pain medicine 30 minutes before exercises. It is easier to prevent pain before it starts than to stop it once it has started.  
  · If you think your pain medicine is making you sick to your stomach: 
¨ Take your medicine after meals (unless your doctor has told you not to). ¨ Ask your doctor for a different pain medicine.  
  · If your doctor prescribed antibiotics, take them as directed. Do not stop taking them just because you feel better. You need to take the full course of antibiotics. Incision care 
  · If your doctor told you how to care for your cut (incision), follow your doctor's instructions. You will have a dressing over the cut. A dressing helps the incision heal and protects it. Your doctor will tell you how to take care of this.  
  · If you did not get instructions, follow this general advice: ¨ If you have strips of tape on the cut the doctor made, leave the tape on for a week or until it falls off. ¨ If you have stitches or staples, your doctor will tell you when to come back to have them removed. ¨ If you have skin adhesive on the cut, leave it on until it falls off. Skin adhesive is also called glue or liquid stitches. ¨ Change the bandage every day. ¨ Wash the area daily with warm water, and pat it dry. Don't use hydrogen peroxide or alcohol. They can slow healing. ¨ You may cover the area with a gauze bandage if it oozes fluid or rubs against clothing. ¨ You may shower 24 to 48 hours after surgery. Pat the incision dry. Don't swim or take a bath for the first 2 weeks, or until your doctor tells you it is okay. Exercise 
  · Your rehab program will give you a number of exercises to do to help you get back your knee's range of motion and strength. Always do them as your therapist tells you. Ice and elevation 
  · For pain and swelling, put ice or a cold pack on the area for 10 to 20 minutes at a time. Put a thin cloth between the ice and your skin. Other instructions 
  · Continue to wear your support stockings as your doctor says. These help to prevent blood clots. The length of time that you will have to wear them depends on your activity level and the amount of swelling.  
  · You have metal pieces in your knee. These may set off some airport metal detectors. Carry a medical alert card that says you have an artificial joint, just in case. Follow-up care is a key part of your treatment and safety. Be sure to make and go to all appointments, and call your doctor if you are having problems. It's also a good idea to know your test results and keep a list of the medicines you take. When should you call for help? Call 911 anytime you think you may need emergency care. For example, call if: 
  · You passed out (lost consciousness).  
  · You have severe trouble breathing.  
  · You have sudden chest pain and shortness of breath, or you cough up blood.  
 Call your doctor now or seek immediate medical care if: 
  · You have signs of infection, such as: 
¨ Increased pain, swelling, warmth, or redness. ¨ Red streaks leading from the incision. ¨ Pus draining from the incision. ¨ A fever.  
  · You have signs of a blood clot, such as: 
¨ Pain in your calf, back of the knee, thigh, or groin. ¨ Redness and swelling in your leg or groin.  
  · Your incision comes open and begins to bleed, or the bleeding increases.   · You have pain that does not get better after you take pain medicine.  
 Watch closely for changes in your health, and be sure to contact your doctor if: 
  · You do not have a bowel movement after taking a laxative. Where can you learn more? Go to http://amna-rodger.info/. Enter V211 in the search box to learn more about \"Total Knee Replacement: What to Expect at Home. \" Current as of: November 29, 2017 Content Version: 11.7 © 1336-5615 [employer]inal. Care instructions adapted under license by [facility] (which disclaims liability or warranty for this information). If you have questions about a medical condition or this instruction, always ask your healthcare professional. [address] any warranty or liability for your use of this information. These are general instructions for a healthy lifestyle: No smoking/ No tobacco products/ Avoid exposure to second hand smoke Surgeon General's Warning:  Quitting smoking now greatly reduces serious risk to your health. Obesity, smoking, and sedentary lifestyle greatly increases your risk for illness A healthy diet, regular physical exercise & weight monitoring are important for maintaining a healthy lifestyle You may be retaining fluid if you have a history of heart failure or if you experience any of the following symptoms:  Weight gain of 3 pounds or more overnight or 5 pounds in a week, increased swelling in our hands or feet or shortness of breath while lying flat in bed. Please call your doctor as soon as you notice any of these symptoms; do not wait until your next office visit. Recognize signs and symptoms of STROKE: 
 
F-face looks uneven A-arms unable to move or move even S-speech slurred or non-existent T-time-call 911 as soon as signs and symptoms begin-DO NOT go Back to bed or wait to see if you get better-TIME IS BRAIN. The discharge information has been reviewed with the patient. The patient verbalized understanding. Information obtained by : 
I understand that if any problems occur once I am at home I am to contact my physician. I understand and acknowledge receipt of the instructions indicated above. Physician's or R.N.'s Signature                                                                  Date/Time Patient or Representative Signature                                                          Date/Time Introducing \[facility] [place] [facility]\"" & [facility] SERVICES! Dear Severo Bazzi: 
Thank you for requesting a Screen Tonic account. Our records indicate that you already have an active Screen Tonic account. You can access your account anytime at [email or web address] Did you know that you can access your hospital and ER discharge instructions at any time in Screen Tonic? You can also review all of your test results from your hospital stay or ER visit. Additional Information If you have questions, please visit the Frequently Asked Questions section of the Screen Tonic website at [email or web address] Remember, Screen Tonic is NOT to be used for urgent needs. For medical emergencies, dial 911. Now available from your iPhone and Android! Introducing Rob Melgar As a [employer] patient, I wanted to make you aware of our electronic visit tool called Rob Melgar. [employer] 24/7 allows you to connect within minutes with a medical provider 24 hours a day, seven days a week via a mobile device or tablet or logging into a secure website from your computer.   You can access [facility] SecPixium Vision 24/7 from anywhere in the [place]. A virtual visit might be right for you when you have a simple condition and feel like you just dont want to get out of bed, or cant get away from work for an appointment, when your regular [facility] provider is not available (evenings, weekends or holidays), or when youre out of town and need minor care. Electronic visits cost only $49 and if the [employer] 24/7 provider determines a prescription is needed to treat your condition, one can be electronically transmitted to a nearby pharmacy*. Please take a moment to enroll today if you have not already done so. The enrollment process is free and takes just a few minutes. To enroll, please download the M[employer]Bella 24/7 ac to your tablet or phone, or visit [email or web address] to enroll on your computer. And, as an [address] [place] [address] patient with a VCE account, the results of your visits will be scanned into your electronic medical record and your primary care provider will be able to view the scanned results. We urge you to continue to see your regular [facility] provider for your ongoing medical care. And while your primary care provider may not be the one available when you seek a [employer] virtual visit, the peace of mind you get from getting a real diagnosis real time can be priceless. For more information on [employer], view our Frequently Asked Questions (FAQs) at [email or web address]. Sincerely, 
 
Barby Garcia MD 
Chief Medical Officer [address] *:  certain medications cannot be prescribed via Rob Conektakirtifin Providers Seen During Your Hospitalization Provider Specialty Primary office phone Lenin Berrios MD Orthopedic Surgery [phone] Immunizations Administered for This Admission Name Date  
 TB Skin Test (PPD) Intradermal  Deferred (), 7/25/2018 Your Primary Care Physician (PCP) Primary Care Physician Office Phone Office Fax Charu Hernandez [phone] You are allergic to the following Allergen Reactions Latex Other (comments) Burns; blisters Keflex (Cephalexin) Hives Pcn (Penicillins) Hives Sulfa (Sulfonamide Antibiotics) Hives Recent Documentation Height Weight Breastfeeding? BMI OB Status Smoking Status 1.6 m 111.2 kg No 43.44 kg/m2 Hysterectomy Never Smoker Emergency Contacts Name Discharge Info Relation Home Work Mobile Tamara Miller [23] [phone] Patient Belongings The following personal items are in your possession at time of discharge: 
  Dental Appliances: None  Visual Aid: Glasses      Home Medications: None   Jewelry: None       Other Valuables: Cell Phone (and ID with Tip العلي) Please provide this summary of care documentation to your next provider. Signatures-by signing, you are acknowledging that this After Visit Summary has been reviewed with you and you have received a copy. Patient Signature:  ____________________________________________________________ Date:  ____________________________________________________________  
  
Vanesa Freed Provider Signature:  ____________________________________________________________ Date:  ____________________________________________________________

## 2018-07-25 NOTE — H&P
The patient has end stage arthritis of the right knee. The patient was see and examined and there are no changes to the patient's orthopedic condition. They have tried conservative treatment for this condition; including antiinflammatories and lifestyle modifications and have failed. The necessity for the joint replacement is still present, and the H&P from the office is still current.  The patient will be admitted today for Procedure(s) (LRB):  RIGHT KNEE ARTHROPLASTY TOTAL / Torrey Son / FNB (Right)

## 2018-07-25 NOTE — ANESTHESIA PROCEDURE NOTES
Spinal Block    Start time: 7/25/2018 7:40 AM  End time: 7/25/2018 7:43 AM  Performed by: Carlos Gimenez  Authorized by: Carlos Gimenez     Pre-procedure:   Indications: primary anesthetic  Preanesthetic Checklist: patient identified, risks and benefits discussed, anesthesia consent, patient being monitored and timeout performed    Timeout Time: 07:39          Spinal Block:   Patient Position:  Seated  Prep Region:  Lumbar  Prep: chlorhexidine and patient draped      Location:  L3-4  Technique:  Single shot  Local:  Lidocaine 1%  Local Dose (mL):  2    Needle:   Needle Type:  Pencan  Needle Gauge:  25 G  Attempts:  1      Events: CSF confirmed, no blood with aspiration and no paresthesia        Assessment:  Insertion:  Uncomplicated  Patient tolerance:  Patient tolerated the procedure well with no immediate complications

## 2018-07-25 NOTE — OP NOTES
[facility] [employer]  Cementless Total Knee Arthroplasty: Posterior Cruciate Retaining     Crissy Denise   : 1955  Medical Record [id number]  Pre-operative Diagnosis:  Primary osteoarthritis of right knee [M17.11]  Post-operative Diagnosis: Primary osteoarthritis right knee  Location: [address] [place] [address]  Surgeon: Libra Leyva MD   Assistant: Rachel Edwards    Anesthesia: Spinal and FNB    Procedure:Procedure(s) (LRB):  RIGHT KNEE ARTHROPLASTY TOTAL / Sharyne Neighbours / FNB (Right)   The complexity of the total joint surgery requires the use of a first assistant for positioning, retraction and expertise in closure. Tourniquet Time: 0 minutes  EBL: 250 cc  Findings: severe degenerative arthritis, patellar osteophytes, posterior femoral osteophytes   BMI: Body mass index is 43.44 kg/(m^2). Bang Gilliam was brought to the operating room and positioned on the operating table. She was anesthestized with anesthesia. A latham catheter was placed preoperatively and IV antibiotics was administered. Prior to the incision being made a timeout was called identifying the patient, procedure ,operative side and surgeon The operative leg was prepped and draped in the usual sterile manner. An anterior longitudinal incision was accomplished just medial to the tibial tubercle and extending approximal 6 centimeters proximal to the superior pole of the patella. A medial parapatellar capsular incision was performed. The medial capsular flap was elevated around to the insertion of the semimembranous tendon. The patella was everted and the knee flexed and externally rotated. The medial and external menisci were excised. The lateral half of the fat pad excised and the patella femoral ligament was released. The anterior cruciate ligament was resect and the posterior cruciate ligament was retained.   Using extramedullary instrumentation, the tibial cut was accomplished with appropriate posterior slope. The distal femur was addressed. A drill hole was made above the intracondylar notch. Using appropriate intramedullary instrumentation,a five degree valgus distal cut was accomplished. The femur was sized. The anterior and posterior cuts were then made about the distal femur. The osteophytes were removed from the tibial and femoral surfaces. The flexion and extension gaps were assessed with the appropriate spacer blocks. Additional surgical procedures included: none. The flexion and extension gaps were deemed appropriately balanced. The appropriate cutting blocks were then utilized to perform the anterior, posterior and chamfer cuts, with appropriate lateral translation accomplished for the patellofemoral groove. Approximately 9 mm of bone was removed from the high side of the tibia. The tibia was sized. .  The tibial base plate was pinned into place with the appropriate external rotation and stem site prepared. A preliminary range of motion was accomplished. The  Patient was found to obtain full extension as well as appropriate flexion. The patient's ligaments were stable in flexion and extension to medial and lateral stressing and the alignment was through the appropriate mechanical axis. The patella was then everted. The bone was resect to accommodate the three peg patella button. A trial reduction revealed appropriate tracking through the patellofemoral groove with no lateral retinacular release being accomplished. All trial components were removed. The real implants were opened: Sizes listed below. The knee was irrigated. There were no femoral deficiencies. There were no tibial deficiencies. No augmentation was utilized. The permanent cementless Tibial and Femoral components were impacted into place. The cementless  patella component was then pressed in place.     [facility] knee was placed through range of motion and noted to be stable as mentioned above with the trail components. The wound was dry, therefore no drain was used. The operative knee was injected with 60 cc of Naropin, 10 cc's of morphine and 1 cc of 30 mg of Toradol. The knee was then soaked with a diluted betadine solution for approximately 3 min. This was then thoroughly irrigated. The capsular layer was closed using a #1 PDS suture. Then, 1 gram (100 mg/ml) of Transexamic Acid was injected into the joint space. The subcutaneous layers were closed using 2-0 Stratafix. Finally the skin was closed using 3-0 Vicryl and skin staples, which were applied in occlusive fashion and sterile bandage applied. An Iceman cryo pad was applied on the operative leg. Sponge count and needle counts were correct. Ernesto Denise left the operating room     Implants:   Implant Name Type Inv.  Item Serial No.  Lot No. LRB No. Used   COMPNT FEM CR TRIATHLN 3 R PA --  - [id number]  COMPNT FEM CR TRIATHLN 3 R PA --  [id number] [employer] HOW [id number] Right 1   BASEPLT TIB PC TRITNM SZ 2 -- TRIATHLON - [id number]  BASEPLT TIB PC TRITNM SZ 2 -- TRIATHLON [id number] [employer] HOW [id number] Right 1   PAT ASYM MTL-BK 10MM SZ A32 -- TRIATHLON - [id number]  PAT ASYM MTL-BK 10MM SZ A32 -- TRIATHLON E1LL [employer] HOW [id number] Right 1   INSERT TIB ARTC BRNG SZ2 13MM -- TRIATHLON - [id number]   INSERT TIB ARTC BRNG SZ2 13MM -- TRIATHLON [id number] [facility] ORTHOPEDICS HOW [id number] Right 1         Signed By: Rick Kolb MD   7/25/2018,  8:43 AM

## 2018-07-25 NOTE — PROGRESS NOTES
Problem: Self Care Deficits Care Plan (Adult)  Goal: *Acute Goals and Plan of Care (Insert Text)  GOALS:   DISCHARGE GOALS (in preparation for going home/rehab):  3 days  1. Ms. Bridget Palafox will perform one lower body dressing activity with minimal assistance required to demonstrate improved functional mobility and safety. 2.  Ms. Bridget Palafox will perform one lower body bathing activity with minimal assistance required to demonstrate improved functional mobility and safety. 3.  Ms. Bridget Palafox will perform toileting/toilet transfer with contact guard assistance to demonstrate improved functional mobility and safety. 4.  Ms. Bridget Palafox will perform shower transfer with contact guard assistance to demonstrate improved functional mobility and safety. JOINT CAMP OCCUPATIONAL THERAPY TKA: Initial Assessment 7/25/2018  INPATIENT: Hospital Day: 1  Payor: Linda Caputo / Plan: [address] [place] [address] HMO / Product Type: [employer] Medicare /      NAME/AGE/GENDER: Kirby Abernathy is a 61 y.o. female   PRIMARY DIAGNOSIS:  Primary osteoarthritis of right knee [M17.11]   Procedure(s) and Anesthesia Type:     * RIGHT KNEE ARTHROPLASTY TOTAL / Markell Radha / FNB - Spinal (Right)  ICD-10: Treatment Diagnosis:    · Pain in Right Knee (M25.561)  · Stiffness of Right Knee, Not elsewhere classified (M25.661)      ASSESSMENT:     Ms. Bridget Palafox is s/p Right TKA and presents with decreased weight bearing on R LE and decreased independence with functional mobility and activities of daily living as compared to baseline level of function and safety. Patient would benefit from skilled Occupational Therapy to maximize independence and safety with self-care task and functional mobility. Pt would also benefit from education on adaptive equipment and safety precautions in preparation for going home or for recommendations for post-hospital rehab program.    Able to mobilize with therapies from bed to recliner using RW.      This section established at most recent assessment   PROBLEM LIST (Impairments causing functional limitations):  1. Decreased Strength  2. Decreased ADL/Functional Activities  3. Decreased Transfer Abilities  4. Increased Pain  5. Increased Fatigue  6. Decreased Flexibility/Joint Mobility  7. Decreased Knowledge of Precautions   INTERVENTIONS PLANNED: (Benefits and precautions of occupational therapy have been discussed with the patient.)  1. Activities of daily living training  2. Adaptive equipment training  3. Balance training  4. Clothing management  5. Donning&doffing training  6. Theraputic activity     TREATMENT PLAN: Frequency/Duration: Follow patient 1-2tx to address above goals. Rehabilitation Potential For Stated Goals: Excellent     RECOMMENDED REHABILITATION/EQUIPMENT: (at time of discharge pending progress): Home Health: Physical Therapy. OCCUPATIONAL PROFILE AND HISTORY:   History of Present Injury/Illness (Reason for Referral): Pt presents this date s/p (Right) TKA. Past Medical History/Comorbidities:   Ms. Edith Olea  has a past medical history of Adverse effect of anesthesia; Allergic rhinitis; Asthma; Autoimmune disease ([address] DDD (degenerative disc disease), lumbar; Depression; Diabetes ([address] Diverticulitis (2016); Fibromyalgia; Fracture (2016); GERD (gastroesophageal reflux disease); Herpes genitalis in women; Hypertension; IBS (irritable bowel syndrome); Morbid obesity ([address] Primary hypothyroidism; PUD (peptic ulcer disease); Sleep apnea; and Status post right knee replacement (7/25/2018). She also has no past medical history of Aneurysm ([address] Arrhythmia; CAD (coronary artery disease); Cancer ([address] Chronic kidney disease; Chronic obstructive pulmonary disease ([address] Chronic pain; Coagulation disorder ([address] Difficult intubation; Endocarditis; Heart failure ([address] Liver disease; Malignant hyperthermia due to anesthesia;  Nausea & vomiting; Nicotine vapor product user; Non-nicotine vapor product user; Pseudocholinesterase deficiency; Rheumatic fever; Seizures ([facility] Stroke [facility]); or Thromboembolus ([facility] Ms. Leda Perkins  has a past surgical history that includes hx hysterectomy; hx tubal ligation; hx colonoscopy (09/2016); and hx endoscopy. Social History/Living Environment:   Home Environment: Private residence  # Steps to Enter: 1  One/Two Story Residence: Two story  # of Interior Steps: 12  Interior Rails: Left  Lift Chair Available: No  Living Alone: No  Support Systems: Family member(s)  Patient Expects to be Discharged to[de-identified] Private residence  Current DME Used/Available at Home: gladys Cedillo  Prior Level of Function/Work/Activity:  Independent Prior   Number of Personal Factors/Comorbidities that affect the Plan of Care: Brief history (0):  LOW COMPLEXITY   ASSESSMENT OF OCCUPATIONAL PERFORMANCE[de-identified]   Most Recent Physical Functioning:   Balance  Sitting: Intact  Standing: With support                    Coordination  Fine Motor Skills-Upper: Left Intact; Right Intact  Gross Motor Skills-Upper: Left Intact; Right Intact         Mental Status  Neurologic State: Alert  Orientation Level: Oriented X4  Cognition: Appropriate decision making  Perception: Appears intact  Perseveration: No perseveration noted                Basic ADLs (From Assessment) Complex ADLs (From Assessment)   Basic ADL  Feeding: Independent  Oral Facial Hygiene/Grooming: Setup  Bathing: Minimum assistance  Upper Body Dressing: Setup  Lower Body Dressing: Maximum assistance  Toileting: Maximum assistance     Grooming/Bathing/Dressing Activities of Daily Living                       Functional Transfers  Toilet Transfer : Moderate assistance  Shower Transfer: Moderate assistance     Bed/Mat Mobility  Supine to Sit: Contact guard assistance  Sit to Stand: Minimum assistance  Bed to Chair: Minimum assistance         Physical Skills Involved:  1. Range of Motion  2. Balance  3.  Strength Cognitive Skills Affected (resulting in the inability to perform in a timely and safe manner):  1. NT Psychosocial Skills Affected:  1. NT   Number of elements that affect the Plan of Care: 1-3:  LOW COMPLEXITY   CLINICAL DECISION MAKING:   [employer] AM-PAC 6 Clicks   Daily Activity Inpatient Short Form  How much help from another person does the patient currently need. .. Total A Lot A Little None   1. Putting on and taking off regular lower body clothing? [] 1   [x] 2   [] 3   [] 4   2. Bathing (including washing, rinsing, drying)? [] 1   [x] 2   [] 3   [] 4   3. Toileting, which includes using toilet, bedpan or urinal?   [] 1   [x] 2   [] 3   [] 4   4. Putting on and taking off regular upper body clothing? [] 1   [] 2   [] 3   [x] 4   5. Taking care of personal grooming such as brushing teeth? [] 1   [] 2   [] 3   [x] 4   6. Eating meals? [] 1   [] 2   [] 3   [x] 4   © 2007, Trustees of [facility] [employer], under license to [email or web address]. All rights reserved     Score:  Initial: 18 Most Recent: X (Date: -- )    Interpretation of Tool:  Represents activities that are increasingly more difficult (i.e. Bed mobility, Transfers, Gait). Score 24 23 22-20 19-15 14-10 9-7 6     Modifier CH CI CJ CK CL CM CN      ? Self Care:     - CURRENT STATUS: CK - 40%-59% impaired, limited or restricted    - GOAL STATUS: CJ - 20%-39% impaired, limited or restricted    - D/C STATUS:  ---------------To be determined---------------  Payor: [employer] [place] / Plan: [address] [place] [address] HMO / Product Type: Managed Care Medicare /      Medical Necessity:     · Skilled intervention continues to be required due to Deficits listed above. Reason for Services/Other Comments:  · Patient continues to require skilled intervention due to new TKA.    Use of outcome tool(s) and clinical judgement create a POC that gives a: LOW COMPLEXITY            TREATMENT:   (In addition to Assessment/Re-Assessment sessions the following treatments were rendered) Pre-treatment Symptoms/Complaints:  None  Pain: Initial:   Pain Intensity 1: 3  Post Session:  3     Assessment/Reassessment only, no treatment provided today    Treatment/Session Assessment:     Response to Treatment:  Good, sitting up in recliner. Education:  [] Home Exercises  [x] Fall Precautions  [] Hip Precautions [] Going Home Video  [x] Knee/Hip Prosthesis Review  [x] Walker Management/Safety [x] Adaptive Equipment as Needed       Interdisciplinary Collaboration:   o Physical Therapist  o Occupational Therapist  o Registered Nurse    After treatment position/precautions:   o Up in chair  o Bed/Chair-wheels locked  o Caregiver at bedside  o Call light within reach  o RN notified  o Family at bedside     Compliance with Program/Exercises: compliant all of the time. Recommendations/Intent for next treatment session:  Treatment next visit will focus on increasing Ms. Biswass independence with bed mobility, transfers, self care, functional mobility, modalities for pain, and patient education.       Total Treatment Duration:  OT Patient Time In/Time Out  Time In: 1310  Time Out: 1980 [place], [place]

## 2018-07-25 NOTE — PROGRESS NOTES
[address]  Face to Face Encounter    Patients Name: Javed Coto    YOB: 1955    Ordering Physician:  Nikki Dobbins    Primary Diagnosis: Primary osteoarthritis of right knee [M17.11]  S/p right tKA    Date of Face to Face:   7/25/2018                                  Face to Face Encounter findings are related to primary reason for home care:   yes. 1. I certify that the patient needs intermittent care as follows: physical therapy: gait/stair training    2. I certify that this patient is homebound, that is: 1) patient requires the use of a walker device, special transportation, or assistance of another to leave the home; or 2) patient's condition makes leaving the home medically contraindicated; and 3) patient has a normal inability to leave the home and leaving the home requires considerable and taxing effort. Patient may leave the home for infrequent and short duration for medical reasons, and occasional absences for non-medical reasons. Homebound status is due to the following functional limitations: Patient's ambulation limited secondary to severe pain and requires the use of an assistive device and the assistance of a caregiver for safe completion. Patient with strength and ROM deficits limiting ambulation endurance requiring the use of an assistive device and the assistance of a caregiver. Patient deemed temporarily homebound secondary to increased risk for infection when leaving home and going out into the community. 3. I certify that this patient is under my care and that I, or a nurse practitioner or  [id number], or clinical nurse specialist, or certified nurse midwife, working with me, had a Face-to-Face Encounter that meets the physician Face-to-Face Encounter requirements.   The following are the clinical findings from the [address] [place] [address] encounter that support the need for skilled services and is a summary of the encounter: see hospital chart          Pro Tellez Yvonne Anderson, LOLY  7/25/2018      THE FOLLOWING TO BE COMPLETED BY THE COMMUNITY PHYSICIAN:    I concur with the findings described above from the F2F encounter that this patient is homebound and in need of a skilled service.     Certifying Physician: _____________________________________      Printed Certifying Physician Name: _____________________________________    Date: _________________

## 2018-07-25 NOTE — PROGRESS NOTES
07/25/18 1205   Oxygen Therapy   O2 Sat (%) 98 %   Pulse via Oximetry 81 beats per minute   O2 Device Room air   Incentive Spirometry Treatment   Actual Volume (ml) 750 ml   Number of Attempts Greater than 3   Patient achieved  750  Ml/sec on IS. Patient encouraged to do 10 breaths every hour while awake-patient agreed and demonstrated. No shortness of breath or distress noted. BS are clear b/l. [facility] notes reviewed- continuous SAT monitoring ordered during hours of sleep; monitor number 13 at bedside. Albuterol ordered PRN.

## 2018-07-25 NOTE — PERIOP NOTES
TRANSFER - IN REPORT:    Verbal report received from Elsi Funes 44 on [employer]  being received from 3 ortho for routine progression of care      Report consisted of patients Situation, Background, Assessment and   Recommendations(SBAR). Information from the following report(s) SBAR was reviewed with the receiving nurse. Opportunity for questions and clarification was provided. Assessment completed upon patients arrival to unit and care assumed.

## 2018-07-25 NOTE — CONSULTS
Physical Medicine & Rehabilitation Note-consult    Patient: Desiree Lopez MRN: [id number]  SSN: [id number]    YOB: 1955  Age: 61 y.o. Sex: female      Admit Date: 7/25/2018  Admitting Physician: Yemi Ivey MD    Medical Decision Making/Plan/Recommend: Gait impairment following right total knee arthroplasty. Patient is progressing well, and shows no major barriers to progress. Continue PT, OT for active/assisted/passive ROM, strengthening, mobility, transfers, gait training. Will follow progress. Patient is expected to make continued functional gains. Continued rehab at home via [facility] PT would be reasonable and necessary. Chief Complaint : Gait dysfunction secondary to below.   Admit Diagnosis: Primary osteoarthritis of right knee [M17.11]  right total knee arthroplasty   Pain  DVT risk  Post op hemorrhagic anemia  Fibromyalgia  Morbid obesity (HCC)  DDD (degenerative disc disease), lumbar  Diabetes ([address]  Acute Rehab Dx:  Gait impairment  Mobility and ambulation deficits  Self Care/ADL deficits    Medical Dx:  Past Medical History:   Diagnosis Date    Adverse effect of anesthesia     hard to wake up    Allergic rhinitis     Asthma     Autoimmune disease ([address]     fibromyalgia    DDD (degenerative disc disease), lumbar     Depression     Diabetes ([address]     pre-diabetes~diet controlled; does not check SQBS regularly    Diverticulitis 2016    diagnosed at Colonoscopy    Fibromyalgia     Fracture 2016    right foot    GERD (gastroesophageal reflux disease)     Herpes genitalis in women     dormant    Hypertension     IBS (irritable bowel syndrome)     Morbid obesity ([address]     Primary hypothyroidism     PUD (peptic ulcer disease)     Sleep apnea     had 2 studies~\"mild\" ~no CPAP    Status post right knee replacement 7/25/2018     Subjective:     HPI: Desiree Lopez is a 61 y.o. female patient at [address] [place] [address] who was admitted on 7/25/2018  by Jameel Morales MD with below mentioned medical history, is being seen for Physical Medicine and Rehabilitation consult. Cris Denise who had debilitating right knee pain and discomfort with walking and activity due to end stage DJD. Patient underwent a right total knee arthroplasty per Dr. Jameel Morales MD on 7/25/2018. Patient is starting to stand, taking steps with acute PT and OT. Patient still has significant functional deficits due to right knee pain, decreased ROM and decreased strength. Bang Gilliam is seen and examined today. Medical Records reviewed. Patient denies any other pre morbid functional deficits. Patient has been independent with ambulation but was limited by right knee pain. Previous Functional Level:    independent    Current Level of Function:   bed mobility - CGA, transfers - CGA decreased balance , ambulation - CGA       Family History   Problem Relation Age of Onset    Diabetes Sister     Cancer Mother      Lung    COPD Father     Hypertension Father     GERD Father     Thyroid Disease Daughter      Hypothyroidism      Social History   Substance Use Topics    Smoking status: Never Smoker    Smokeless tobacco: Never Used    Alcohol use Yes      Comment: rarely     Past Surgical History:   Procedure Laterality Date    HX COLONOSCOPY  09/2016    HX ENDOSCOPY      during colonoscopy    HX HYSTERECTOMY      HX TUBAL LIGATION        Prior to Admission medications    Medication Sig Start Date End Date Taking? Authorizing Provider   aspirin delayed-release 81 mg tablet Take 1 Tab by mouth every twelve (12) hours every twelve (12) hours for 35 days. 7/25/18 8/29/18 Yes JUSTICE Vang   HYDROmorphone (DILAUDID) 2 mg tablet Take 1 Tab by mouth every four (4) hours as needed. Max Daily Amount: 12 mg. 7/25/18  Yes JUSTICE aVng   dicyclomine (BENTYL) 20 mg tablet Take 20 mg by mouth as needed.  For diarrhea   Yes Historical Provider   cholecalciferol, VITAMIN D3, (VITAMIN D3) 5,000 unit tab tablet Take 5,000 Units by mouth daily. Indications: PREVENTION OF VITAMIN D DEFICIENCY   Yes Historical Provider   cyanocobalamin, vitamin B-12, (VITAMIN B-12 PO) Take  by mouth daily. Yes Historical Provider   acetaminophen (TYLENOL EXTRA STRENGTH) 500 mg tablet Take 500 mg by mouth every six (6) hours as needed for Pain. Indications: Pain   Yes Historical Provider   amitriptyline (ELAVIL) 25 mg tablet Take 1 Tab by mouth nightly as needed for Sleep. Patient taking differently: Take 25 mg by mouth nightly as needed for Sleep. For sleep, as needed 6/27/18  Yes Abner Llamas NP   lisinopril-hydroCHLOROthiazide (PRINZIDE, ZESTORETIC) 20-12.5 mg per tablet Take 1 Tab by mouth daily. Patient taking differently: Take 1 Tab by mouth daily. Indications: hypertension 6/27/18  Yes Abner Llamas NP   loratadine (CLARITIN) 10 mg tablet Take 1 Tab by mouth daily. Patient taking differently: Take 10 mg by mouth daily. Take / use AM day of surgery  per anesthesia protocols. 6/27/18  Yes Abner Llamas NP   atenolol (TENORMIN) 50 mg tablet TAKE 1 TABLET EVERY DAY  Patient taking differently: Take 50 mg by mouth daily. TAKE 1 TABLET EVERY DAY; Take / use AM day of surgery  per anesthesia protocols. Indications: hypertension 6/27/18  Yes Abner Llamas NP   meloxicam (MOBIC) 15 mg tablet Take 1 Tab by mouth daily as needed for Pain. 6/27/18  Yes Abner Llamas NP   venlafaxine-SR [facility] 150 mg capsule Take 2 Caps by mouth daily. Patient taking differently: Take 300 mg by mouth daily. Take / use AM day of surgery  per anesthesia protocols. Indications: ANXIETY WITH DEPRESSION, major depressive disorder 6/27/18  Yes Abner Llamas NP   omeprazole (PRILOSEC) 20 mg capsule Take 1 Cap by mouth daily. Patient taking differently: Take 20 mg by mouth nightly.  Indications: gastroesophageal reflux disease 6/27/18  Yes Abner Llamas NP   fluticasone (FLOVENT HFA) 110 mcg/actuation inhaler Take 2 Puffs by inhalation every twelve (12) hours. 6/27/18  Yes Denise Genao NP   fluticasone (FLONASE) 50 mcg/actuation nasal spray 2 Sprays by Both Nostrils route daily. Patient taking differently: 2 Sprays by Both Nostrils route daily. Take / use AM day of surgery  per anesthesia protocols. Indications: Allergic Rhinitis 6/27/18  Yes Denise Genao NP   levothyroxine (SYNTHROID) 150 mcg tablet Take 1 Tab by mouth Daily (before breakfast). Patient taking differently: Take 150 mcg by mouth Daily (before breakfast). Take / use AM day of surgery  per anesthesia protocols. 6/27/18  Yes Denise Genao NP   ALPRAZolam Dayan Abo) 0.5 mg tablet Take 1 Tab by mouth daily as needed for Anxiety. Patient taking differently: Take 0.5 mg by mouth daily as needed for Anxiety. Take / use AM day of surgery  per anesthesia protocols. Indications: anxiety 6/27/18  Yes Denise Genao NP   Blood-Glucose Meter monitoring kit Use as instructed. Patient's choice of glucometer. Dx: E11.9 7/18/16  Yes Historical Provider   glucose blood VI test strips (ASCENSIA AUTODISC VI, ONE TOUCH ULTRA TEST VI) strip by Does Not Apply route. 7/18/16  Yes Historical Provider   Lancets misc by Does Not Apply route. 7/18/16  Yes Historical Provider   albuterol (PROVENTIL HFA, VENTOLIN HFA, PROAIR HFA) 90 mcg/actuation inhaler Take 2 Puffs by inhalation every six (6) hours as needed. Patient taking differently: Take 2 Puffs by inhalation daily. Take / use AM day of surgery  per anesthesia protocols. 6/27/18   Denise Genao NP     Allergies   Allergen Reactions    Latex Other (comments)     Burns; blisters    Keflex [Cephalexin] Hives    Pcn [Penicillins] Hives    Sulfa (Sulfonamide Antibiotics) Hives        Review of Systems: +right knee pain, +antalgic gait. Denies chest pain, shortness of breath, cough, headache, visual problems, abdominal pain, dysurea, calf pain.  Pertinent positives are as noted in the medical records and unremarkable otherwise. Objective:     Vitals:  Blood pressure 126/79, pulse 93, temperature 98.6 °F (37 °C), resp. rate 20, height 5' 3\" (1.6 m), weight 245 lb 4 oz (111.2 kg), SpO2 100 %, not currently breastfeeding. Temp (24hrs), Av.5 °F (36.4 °C), Min:96 °F (35.6 °C), Max:98.6 °F (37 °C)    BMI (calculated): 43.5 (18 0653)   Intake and Output:   1901 -  0700  In: 0545 [P.O.:1320; I.V.:2000]  Out: 1775 [Urine:1600]    Physical Exam:   General: Alert and age appropriately oriented. No acute cardio respiratory distress. HEENT: Normocephalic, no conjunctival pallor, no scleral icterus  Oral mucosa moist without cyanosis, no JVD   Lungs: Clear to auscultation anterioly   Heart: Regular rate and rhythm, S1, S2   No  murmurs, clicks, rub or gallops   Abdomen: Soft, non-tender, non-distended. Genitourinary: defered   Neuromuscular:      Grossly no focal motor deficits. Right knee extension 5/5  Right ankle dorsiflexion 5/5  Right ankle plantarflexion 5/5  No sensory deficits distally BLE to soft touch. Skin/extremity: No calf tenderness BLE. No rashes, no marginal erythema.                                                                                          Labs/Studies:  Recent Results (from the past 72 hour(s))   TYPE & SCREEN    Collection Time: 18  6:01 AM   Result Value Ref Range    Crossmatch Expiration 2018     ABO/Rh(D) Ranjith Nations NEGATIVE     Antibody screen NEG    GLUCOSE, POC    Collection Time: 18  6:07 AM   Result Value Ref Range    Glucose (POC) 95 65 - 100 mg/dL   HEMOGLOBIN    Collection Time: 18  7:11 PM   Result Value Ref Range    HGB 10.0 (L) 11.7 - 15.4 g/dL   HEMOGLOBIN    Collection Time: 18  3:54 AM   Result Value Ref Range    HGB 9.4 (L) 11.7 - 15.4 g/dL   HEMOGLOBIN A1C WITH EAG    Collection Time: 18  3:54 AM   Result Value Ref Range    Hemoglobin A1c 5.3 4.8 - 6.0 %    Est. average glucose 977 mg/dL   METABOLIC PANEL, BASIC    Collection Time: 07/26/18  3:54 AM   Result Value Ref Range    Sodium 138 136 - 145 mmol/L    Potassium 4.5 3.5 - 5.1 mmol/L    Chloride 105 98 - 107 mmol/L    CO2 24 21 - 32 mmol/L    Anion gap 9 7 - 16 mmol/L    Glucose 127 (H) 65 - 100 mg/dL    BUN 32 (H) 8 - 23 MG/DL    Creatinine 1.48 (H) 0.6 - 1.0 MG/DL    GFR est AA 46 (L) >60 ml/min/1.73m2    GFR est non-AA 38 (L) >60 ml/min/1.73m2    Calcium 7.6 (L) 8.3 - 10.4 MG/DL   PLEASE READ & DOCUMENT PPD TEST IN 24 HRS    Collection Time: 07/26/18  9:25 AM   Result Value Ref Range    PPD  Negative    mm Induration 0 mm       Functional Assessment:  Reviewed participation and progress in therapies  Gross Assessment  AROM: Within functional limits (left LE) (07/25/18 1410)  Strength: Generally decreased, functional (left LE) (07/25/18 1410)   Bed Mobility  Supine to Sit: Contact guard assistance (07/26/18 0800)   Balance  Sitting: Intact (07/25/18 1410)  Standing: Pull to stand; With support (07/25/18 1410)               Bed/Mat Mobility  Supine to Sit: Contact guard assistance (07/26/18 0800)  Sit to Stand: Contact guard assistance (07/26/18 0800)  Bed to Chair: Contact guard assistance (07/26/18 0800)     Ambulation:  Gait  Speed/Sheila: Delayed (07/26/18 0800)  Step Length: Left shortened;Right shortened (07/26/18 0800)  Stance: Right decreased (07/26/18 0800)  Gait Abnormalities: Antalgic (07/26/18 0800)  Ambulation - Level of Assistance: Contact guard assistance (07/26/18 0800)  Distance (ft): 30 Feet (ft) (07/26/18 0800)  Assistive Device: mali Phipps (07/26/18 0800)  Interventions: Safety awareness training (07/26/18 0800)  Duration: 15 Minutes (07/26/18 0800)    Impression/Plan:     Principal Problem:    Status post right knee replacement (7/25/2018)    Active Problems:    Osteoarthritis of right knee (10/11/2017)        Current Facility-Administered Medications   Medication Dose Route Frequency Provider Last Rate Last Dose    albuterol (PROVENTIL VENTOLIN) nebulizer solution 2.5 mg  2.5 mg Nebulization Q6H PRN Sasha Meadows MD        albuterol CONCENTRATE 2.5mg/0.5 mL neb soln  2.5 mg Inhalation DAILY JUSTICE Cedeno   2.5 mg at 07/26/18 0848    ALPRAZolam (XANAX) tablet 0.5 mg  0.5 mg Oral DAILY PRN JUSTICE Cedeno        amitriptyline (ELAVIL) tablet 25 mg  25 mg Oral QHS Ian Cedeno   25 mg at 07/25/18 2034    atenolol (TENORMIN) tablet 50 mg  50 mg Oral DAILY JUSTICE Cedeno   50 mg at 07/26/18 0915    fluticasone (FLONASE) 50 mcg/actuation nasal spray 2 Spray  2 Spray Both Nostrils QHS Ian Cedeno        fluticasone (FLOVENT HFA) 110 mcg inhaler (Patient Supplied)  2 Puff Inhalation Q12H UJSTICE Cedeno   2 Puff at 07/26/18 0849    lisinopril-hydroCHLOROthiazide (PRINZIDE, ZESTORETIC) 20-12.5 mg per tablet 1 Tab  1 Tab Oral DAILY JUSTICE Cedeno   1 Tab at 07/26/18 0915    levothyroxine (SYNTHROID) tablet 150 mcg  150 mcg Oral ACB Ian Cedeno   150 mcg at 07/26/18 0617    pantoprazole (PROTONIX) tablet 40 mg  40 mg Oral ACB Ian Cedeno   40 mg at 07/26/18 0618    venlafaxine-SR (EFFEXOR-XR) capsule 300 mg  300 mg Oral DAILY JUSTICE Cedeno   300 mg at 07/26/18 0915    dicyclomine (BENTYL) capsule 20 mg  20 mg Oral BID PRN JUSTICE Cedeno        0.9% sodium chloride infusion  100 mL/hr IntraVENous CONTINUOUS JUSTICE Cedeno 100 mL/hr at 07/25/18 2040 100 mL/hr at 07/25/18 2040    sodium chloride (NS) flush 5-10 mL  5-10 mL IntraVENous Q8H Ian Cedeno        sodium chloride (NS) flush 5-10 mL  5-10 mL IntraVENous PRN JUSTICE Cedeno        acetaminophen (TYLENOL) tablet 1,000 mg  1,000 mg Oral Q6H JUSTICE Cedeno   1,000 mg at 07/26/18 0617    HYDROmorphone (DILAUDID) tablet 2 mg  2 mg Oral Q4H PRN JUSTICE Cedeno   2 mg at 07/26/18 1026    HYDROmorphone (PF) (DILAUDID) injection 1 mg  1 mg IntraVENous Q3H PRN JUSTICE Cedeno   1 mg at 07/25/18 1348    naloxone (NARCAN) injection 0.2-0.4 mg  0.2-0.4 mg IntraVENous Q10MIN PRN Carry JUSTICE Catalan        dexamethasone (DECADRON) injection 10 mg  10 mg IntraVENous ONCE Carry Ian Catalan        ondansetron [facility]) injection 4 mg  4 mg IntraVENous Q4H PRN Carry JUSTICE Catalan        diphenhydrAMINE (BENADRYL) capsule 25 mg  25 mg Oral Q4H PRN Carry JUSTICE Catalan        senna-docusate (PERICOLACE) 8.6-50 mg per tablet 2 Tab  2 Tab Oral DAILY Carry Celia Catalanma   2 Tab at 07/26/18 0915    aspirin delayed-release tablet 81 mg  81 mg Oral Q12H Carry Alayna [place]   81 mg at 07/26/18 0915        Recommendations:   Plan for home discharge with [place] PT. Continue Acute Rehab Program.  Coordination of rehab/medical care. Rehabilitation Management/ Medical Management: 1. Devices:Walkers, Type: Rolling Walker  2. Consult:Rehab team including PT, OT,  and . 3. Disposition Rehab-discussed with patient. 4. Thigh-high or knee-high CHUY's when out of bed. 5. DVT Prophylaxis - aspirin 81mg bid x 30days. 6. Incentive spirometer Q1H while awake  7. Post op hemorrhagic anemia- monitor. 8. Activity: WBAT RLE  9. Planned Labs: CBC,BMP  10. Pain Control: On scheduled tylenol, celebrex and  PRN meds. 11. Wound Care: Keep right TKA wound clean and dry and reinforce dressing PRN. May remove Aquacel 1 week post op ad replace with new one. Remove staples 12-14 post surgery, when incision appears appropriately closed and apply benzoin and 1/2\" steristrips. Follow up with ORTHO per instructions. Thank you for the opportunity to participate in the care of this patient.     Signed By: Mike Cooper MD

## 2018-07-25 NOTE — PERIOP NOTES
Spoke with Estiven Ramos in pharmacy and she is making the C/ [address] and will tube it to PACU for use in the block area.

## 2018-07-25 NOTE — PERIOP NOTES
Teach back method used in review of Incentive Spirometer (reached 1000 preop), Hibiclens usage preop, TB screening and pain management goals.

## 2018-07-25 NOTE — PERIOP NOTES
TRANSFER - OUT REPORT:    Verbal report given to Sherwin Rushing RN on [employer]  being transferred to [facility] for routine progression of care       Report consisted of patients Situation, Background, Assessment and   Recommendations(SBAR). Information from the following report(s) SBAR, MAR and Recent Results was reviewed with the receiving nurse. Lines:   Peripheral IV 07/25/18 Left Hand (Active)   Site Assessment Clean, dry, & intact 7/25/2018  5:52 AM   Phlebitis Assessment 0 7/25/2018  5:52 AM   Dressing Status Clean, dry, & intact 7/25/2018  5:52 AM   Dressing Type Transparent;Tape 7/25/2018  5:52 AM   Hub Color/Line Status Pink; Infusing 7/25/2018  5:52 AM   Action Taken Blood drawn 7/25/2018  5:52 AM        Opportunity for questions and clarification was provided. Patient transported with:   Samantha Smith RN waiting on pt's Vancomycin and will dose Gentamycin when pharmacy gets here at 0700.

## 2018-07-25 NOTE — PROGRESS NOTES
Care Management Interventions  Mode of Transport at Discharge: Self  Transition of Care Consult (CM Consult): [address]: No  Reason Outside [place]: Out of service area  Physical Therapy Consult: Yes  Occupational Therapy Consult: Yes  Current Support Network: Lives with Spouse  Confirm Follow Up Transport: Family  Plan discussed with Pt/Family/Caregiver: Yes  Freedom of Choice Offered: Yes  Discharge Location  Discharge Placement: Home with home health    Patient is a 61y.o. year old female admitted for Right TKA . Patient lives with Her spouse and plans to return home on discharge. Order received to arrange home health. Patient without preference towards agency. Referral sent to [facility]. Patient requesting we arrange a walker and bedside commode. Referral sent to [facility] who will deliver to the hospital room prior to discharge. Will follow until discharge.

## 2018-07-25 NOTE — PERIOP NOTES
TRANSFER - IN REPORT:    Verbal report received from [facility] on [employer]  being received from ortho for routine progression of care      Report consisted of patients Situation, Background, Assessment and   Recommendations(SBAR). Information from the following report(s) SBAR was reviewed with the receiving nurse. Opportunity for questions and clarification was provided. Assessment completed upon patients arrival to unit and care assumed.

## 2018-07-26 VITALS
HEART RATE: 86 BPM | SYSTOLIC BLOOD PRESSURE: 97 MMHG | OXYGEN SATURATION: 96 % | RESPIRATION RATE: 20 BRPM | TEMPERATURE: 98 F | DIASTOLIC BLOOD PRESSURE: 63 MMHG | BODY MASS INDEX: 43.45 KG/M2 | HEIGHT: 63 IN | WEIGHT: 245.25 LBS

## 2018-07-26 LAB
ANION GAP SERPL CALC-SCNC: 9 MMOL/L (ref 7–16)
BUN SERPL-MCNC: 32 MG/DL (ref 8–23)
CALCIUM SERPL-MCNC: 7.6 MG/DL (ref 8.3–10.4)
CHLORIDE SERPL-SCNC: 105 MMOL/L (ref 98–107)
CO2 SERPL-SCNC: 24 MMOL/L (ref 21–32)
CREAT SERPL-MCNC: 1.48 MG/DL (ref 0.6–1)
EST. AVERAGE GLUCOSE BLD GHB EST-MCNC: 105 MG/DL
GLUCOSE SERPL-MCNC: 127 MG/DL (ref 65–100)
HBA1C MFR BLD: 5.3 % (ref 4.8–6)
HGB BLD-MCNC: 9.4 G/DL (ref 11.7–15.4)
MM INDURATION POC: 0 MM (ref 0–5)
POTASSIUM SERPL-SCNC: 4.5 MMOL/L (ref 3.5–5.1)
PPD POC: NORMAL NEGATIVE
SODIUM SERPL-SCNC: 138 MMOL/L (ref 136–145)

## 2018-07-26 PROCEDURE — 74011000258 HC RX REV CODE- 258: Performed by: PHYSICIAN ASSISTANT

## 2018-07-26 PROCEDURE — 74011250637 HC RX REV CODE- 250/637: Performed by: PHYSICIAN ASSISTANT

## 2018-07-26 PROCEDURE — 85018 HEMOGLOBIN: CPT | Performed by: PHYSICIAN ASSISTANT

## 2018-07-26 PROCEDURE — 97150 GROUP THERAPEUTIC PROCEDURES: CPT

## 2018-07-26 PROCEDURE — 97116 GAIT TRAINING THERAPY: CPT

## 2018-07-26 PROCEDURE — 97535 SELF CARE MNGMENT TRAINING: CPT

## 2018-07-26 PROCEDURE — 97110 THERAPEUTIC EXERCISES: CPT

## 2018-07-26 PROCEDURE — 83036 HEMOGLOBIN GLYCOSYLATED A1C: CPT | Performed by: ORTHOPAEDIC SURGERY

## 2018-07-26 PROCEDURE — 80048 BASIC METABOLIC PNL TOTAL CA: CPT | Performed by: PHYSICIAN ASSISTANT

## 2018-07-26 PROCEDURE — 94640 AIRWAY INHALATION TREATMENT: CPT

## 2018-07-26 PROCEDURE — 74011000250 HC RX REV CODE- 250: Performed by: PHYSICIAN ASSISTANT

## 2018-07-26 PROCEDURE — 94760 N-INVAS EAR/PLS OXIMETRY 1: CPT

## 2018-07-26 PROCEDURE — 36415 COLL VENOUS BLD VENIPUNCTURE: CPT | Performed by: PHYSICIAN ASSISTANT

## 2018-07-26 RX ADMIN — HYDROMORPHONE HYDROCHLORIDE 2 MG: 2 TABLET ORAL at 06:17

## 2018-07-26 RX ADMIN — ACETAMINOPHEN 1000 MG: 500 TABLET, FILM COATED ORAL at 01:36

## 2018-07-26 RX ADMIN — ACETAMINOPHEN 1000 MG: 500 TABLET, FILM COATED ORAL at 06:17

## 2018-07-26 RX ADMIN — HYDROMORPHONE HYDROCHLORIDE 2 MG: 2 TABLET ORAL at 01:36

## 2018-07-26 RX ADMIN — VENLAFAXINE HYDROCHLORIDE 300 MG: 150 CAPSULE, EXTENDED RELEASE ORAL at 09:15

## 2018-07-26 RX ADMIN — HYDROMORPHONE HYDROCHLORIDE 2 MG: 2 TABLET ORAL at 10:26

## 2018-07-26 RX ADMIN — LISINOPRIL AND HYDROCHLOROTHIAZIDE 1 TABLET: 12.5; 2 TABLET ORAL at 09:15

## 2018-07-26 RX ADMIN — ACETAMINOPHEN 1000 MG: 500 TABLET, FILM COATED ORAL at 12:15

## 2018-07-26 RX ADMIN — HYDROMORPHONE HYDROCHLORIDE 2 MG: 2 TABLET ORAL at 14:36

## 2018-07-26 RX ADMIN — ATENOLOL 50 MG: 50 TABLET ORAL at 09:15

## 2018-07-26 RX ADMIN — SODIUM CHLORIDE 900 MG: 900 INJECTION, SOLUTION INTRAVENOUS at 01:36

## 2018-07-26 RX ADMIN — PANTOPRAZOLE SODIUM 40 MG: 40 TABLET, DELAYED RELEASE ORAL at 06:18

## 2018-07-26 RX ADMIN — SENNOSIDES AND DOCUSATE SODIUM 2 TABLET: 8.6; 5 TABLET ORAL at 09:15

## 2018-07-26 RX ADMIN — FLUTICASONE PROPIONATE 2 PUFF: 110 AEROSOL, METERED RESPIRATORY (INHALATION) at 08:49

## 2018-07-26 RX ADMIN — LEVOTHYROXINE SODIUM 150 MCG: 75 TABLET ORAL at 06:17

## 2018-07-26 RX ADMIN — ASPIRIN 81 MG: 81 TABLET, COATED ORAL at 09:15

## 2018-07-26 RX ADMIN — ALBUTEROL SULFATE 2.5 MG: 2.5 SOLUTION RESPIRATORY (INHALATION) at 08:48

## 2018-07-26 NOTE — PROGRESS NOTES
Problem: Mobility Impaired (Adult and Pediatric)  Goal: *Acute Goals and Plan of Care (Insert Text)  GOALS (1-4 days):  (1.)Ms. Denise will move from supine to sit and sit to supine  in bed with STAND BY ASSIST.  (2.)Ms. Denise will transfer from bed to chair and chair to bed with STAND BY ASSIST using the least restrictive device. (3.)Ms. Denise will ambulate with STAND BY ASSIST for 150 feet with the least restrictive device. (4.)Ms. Denise will ambulate up/down 1 steps with bilateral  railing with CONTACT GUARD ASSIST or with  device. (5.)Ms. Denise will increase right knee ROM to 5°-80°.  ________________________________________________________________________________________________      PHYSICAL THERAPY [facility] tKa: Daily Note, AM 7/26/2018  INPATIENT: Hospital Day: 2  Payor: Raymond Gould / Plan: [address] [place] [address] HMO / Product Type: [employer] Medicare /      NAME/AGE/GENDER: Reese Lennon is a 61 y.o. female   PRIMARY DIAGNOSIS:  Primary osteoarthritis of right knee [M17.11]   Procedure(s) and Anesthesia Type:     * RIGHT KNEE ARTHROPLASTY TOTAL / Johnney Figures / FNB - Spinal (Right)  ICD-10: Treatment Diagnosis:    · Pain in Right Knee (M25.561)  · Stiffness of Right Knee, Not elsewhere classified (M25.661)  · Difficulty in walking, Not elsewhere classified (R26.2)      ASSESSMENT:     Ms. Leann Gonzalez is supine upon arrival.  She performs exercises in the bed without increase in pain. She increase her distance using RW with CGA. She will sit up for a while and then come to group this afternoon. She plans on going home after group. This section established at most recent assessment   PROBLEM LIST (Impairments causing functional limitations):  1. Decreased Strength  2. Decreased ADL/Functional Activities  3. Decreased Transfer Abilities  4. Decreased Ambulation Ability/Technique  5. Decreased Balance  6. Increased Pain  7. Decreased Activity Tolerance  8.  Decreased Flexibility/Joint Mobility  9. Decreased [place] with Home Exercise Program   INTERVENTIONS PLANNED: (Benefits and precautions of physical therapy have been discussed with the patient.)  1. Bed Mobility  2. Gait Training  3. Home Exercise Program (HEP)  4. Therapeutic Exercise/Strengthening  5. Transfer Training  6. Range of Motion: active/assisted/passive  7. Therapeutic Activities  8. Group Therapy     TREATMENT PLAN: Frequency/Duration: Follow patient BID for duration of hospital stay to address above goals. Rehabilitation Potential For Stated Goals: Good     RECOMMENDED REHABILITATION/EQUIPMENT: (at time of discharge pending progress): Continue Skilled Therapy and Home Health: Physical Therapy. HISTORY:   History of Present Injury/Illness (Reason for Referral):  S/p right tka  Past Medical History/Comorbidities:   Ms. Law Jaeger  has a past medical history of Adverse effect of anesthesia; Allergic rhinitis; Asthma; Autoimmune disease ([address] DDD (degenerative disc disease), lumbar; Depression; Diabetes ([address] Diverticulitis (2016); Fibromyalgia; Fracture (2016); GERD (gastroesophageal reflux disease); Herpes genitalis in women; Hypertension; IBS (irritable bowel syndrome); Morbid obesity ([address] Primary hypothyroidism; PUD (peptic ulcer disease); Sleep apnea; and Status post right knee replacement (7/25/2018). She also has no past medical history of Aneurysm ([address] Arrhythmia; CAD (coronary artery disease); Cancer ([address] Chronic kidney disease; Chronic obstructive pulmonary disease ([address] Chronic pain; Coagulation disorder ([address] Difficult intubation; Endocarditis; Heart failure ([address] Liver disease; Malignant hyperthermia due to anesthesia; Nausea & vomiting; Nicotine vapor product user; Non-nicotine vapor product user; Pseudocholinesterase deficiency; Rheumatic fever; Seizures ([address] Stroke [facility]); or Thromboembolus ([address]   Ms. Law Jaeger  has a past surgical history that includes hx hysterectomy; hx tubal ligation; hx colonoscopy (2016); and hx endoscopy. Social History/Living Environment:   Home Environment: Private residence  # Steps to Enter: 1  One/Two Story Residence: Two story  # of Interior Steps: 12  Interior Rails: Left  Lift Chair Available: No  Living Alone: No  Support Systems: Family member(s)  Patient Expects to be Discharged to[de-identified] Private residence  Current DME Used/Available at Home: Cane, straight  Prior Level of Function/Work/Activity:  Independent, cane with gait   Number of Personal Factors/Comorbidities that affect the Plan of Care: 3+: HIGH COMPLEXITY   EXAMINATION:   Most Recent Physical Functioning:                            Bed Mobility  Supine to Sit: Contact guard assistance    Transfers  Sit to Stand: Contact guard assistance  Stand to Sit: Contact guard assistance  Bed to Chair: Contact guard assistance                   Weight Bearing Status  Right Side Weight Bearing: As tolerated  Distance (ft): 30 Feet (ft)  Ambulation - Level of Assistance: Contact guard assistance  Assistive Device: Walker, rolling  Speed/Sheila: Delayed  Step Length: Left shortened;Right shortened  Stance: Right decreased  Gait Abnormalities: Antalgic  Interventions: Safety awareness training     Braces/Orthotics:     Right Knee Cold  Type: Cryocuff      Body Structures Involved:  1. Bones  2. Joints  3. Muscles  4. Ligaments Body Functions Affected:  1. Movement Related Activities and Participation Affected:  1. Mobility   Number of elements that affect the Plan of Care: 3: MODERATE COMPLEXITY   CLINICAL PRESENTATION:   Presentation: Stable and uncomplicated: LOW COMPLEXITY   CLINICAL DECISION MAKIN [facility] [address] [address] AM-PAC 6 Clicks   Basic Mobility Inpatient Short Form  How much difficulty does the patient currently have. .. Unable A Lot A Little None   1. Turning over in bed (including adjusting bedclothes, sheets and blankets)? [] 1   [] 2   [x] 3   [] 4   2.   Sitting down on and standing up from a chair with arms ( e.g., wheelchair, bedside commode, etc.)   [] 1   [] 2   [x] 3   [] 4   3. Moving from lying on back to sitting on the side of the bed? [] 1   [] 2   [x] 3   [] 4   How much help from another person does the patient currently need. .. Total A Lot A Little None   4. Moving to and from a bed to a chair (including a wheelchair)? [] 1   [] 2   [x] 3   [] 4   5. Need to walk in hospital room? [] 1   [] 2   [x] 3   [] 4   6. Climbing 3-5 steps with a railing? [] 1   [] 2   [x] 3   [] 4   © 2007, Trustees of [address] [place] [address] [address] [address], under license to [employer]. All rights reserved        Score:  Initial: 18 Most Recent: X (Date: -- )    Interpretation of Tool:  Represents activities that are increasingly more difficult (i.e. Bed mobility, Transfers, Gait). Score 24 23 22-20 19-15 14-10 9-7 6     Modifier CH CI CJ CK CL CM CN      ? Mobility - Walking and Moving Around:     - CURRENT STATUS: CK - 40%-59% impaired, limited or restricted    - GOAL STATUS: CJ - 20%-39% impaired, limited or restricted    - D/C STATUS:  ---------------To be determined---------------  Payor: Candy Pollard / Plan: [address] [place] [address] HMO / Product Type: Managed Care Medicare /      Medical Necessity:     · Patient is expected to demonstrate progress in strength, range of motion and balance to decrease assistance required with theraputic exercises and functional mobility. Reason for Services/Other Comments:  · Patient continues to require present interventions due to patient's inability to perform theraputic exercises and functional mobility independently.    Use of outcome tool(s) and clinical judgement create a POC that gives a: Clear prediction of patient's progress: LOW COMPLEXITY            TREATMENT:   (In addition to Assessment/Re-Assessment sessions the following treatments were rendered)     Pre-treatment Symptoms/Complaints:  Knee sore  Pain: Initial:   Pain Intensity 1: 2 (about the same)  Post Session:       Therapeutic Exercise: (15 Minutes):  Exercises per grid below to improve mobility and strength. Required minimal visual, verbal and manual cues to promote proper body alignment, promote proper body posture and promote proper body mechanics. Progressed range and repetitions as indicated. Gait Training (15 Minutes):  Gait training to improve and/or restore physical functioning as related to mobility. Ambulated 30 Feet (ft) with Contact guard assistance using a Walker, rolling and minimal Safety awareness training related to their knee position and motion to promote proper body alignment. Date:  7/25 Date:  7/26 Date:     ACTIVITY/EXERCISE AM PM AM PM AM PM   GROUP THERAPY  []  []  []  []  []  []   Ankle Pumps  10a 15      Quad Sets  10a 15      Gluteal Sets  10a 15      Hip ABd/ADduction  10a 15      Straight Leg Raises   15      Knee Slides  10a 15      Short Arc Quads   15      Long Arc Quads         Chair Slides                  B = bilateral; AA = active assistive; A = active; P = passive      Treatment/Session Assessment:     Response to Treatment:  Pt. Did well with gait and exercises. Education:  [x] Home Exercises  [x] Fall Precautions  [] Hip Precautions [] D/C Instruction Review  [x] Knee/Hip Prosthesis Review  [x] Walker Management/Safety [] Adaptive Equipment as Needed       Interdisciplinary Collaboration:   o Registered Nurse    After treatment position/precautions:   o Up in chair  o Bed/Chair-wheels locked  o Bed in low position  o Call light within reach    Compliance with Program/Exercises: Will assess as treatment progresses. No questions. Recommendations/Intent for next treatment session:  Treatment next visit will focus on increasing Ms. Denise's independence with bed mobility, transfers, gait training, strength/ROM exercises, modalities for pain, and patient education.       Total Treatment Duration:  PT Patient Time In/Time Out  Time In: 0645  Time Out: [address] [facility]

## 2018-07-26 NOTE — PROGRESS NOTES
Problem: Self Care Deficits Care Plan (Adult)  Goal: *Acute Goals and Plan of Care (Insert Text)  GOALS: GOAL MET 7/26/2018  DISCHARGE GOALS (in preparation for going home/rehab):  3 days  1. Ms. Radha De Anda will perform one lower body dressing activity with minimal assistance required to demonstrate improved functional mobility and safety. 2.  Ms. Radha De Anda will perform one lower body bathing activity with minimal assistance required to demonstrate improved functional mobility and safety. 3.  Ms. Radha De Anda will perform toileting/toilet transfer with contact guard assistance to demonstrate improved functional mobility and safety. 4.  Ms. Radha De Anda will perform shower transfer with contact guard assistance to demonstrate improved functional mobility and safety. JOINT CAMP OCCUPATIONAL THERAPY TKA: Daily Note, Discharge, Treatment Day: 1st and AM 7/26/2018  INPATIENT: Hospital Day: 2  Payor: Madai Perdue / Plan: [address] [place] [address] HMO / Product Type: Managed Care Medicare /      NAME/AGE/GENDER: Lluvia Kirkland is a 61 y.o. female   PRIMARY DIAGNOSIS:  Primary osteoarthritis of right knee [M17.11]   Procedure(s) and Anesthesia Type:     * RIGHT KNEE ARTHROPLASTY TOTAL / Sekou Luz / FNB - Spinal (Right)  ICD-10: Treatment Diagnosis:    · Pain in Right Knee (M25.561)  · Stiffness of Right Knee, Not elsewhere classified (O54.988)      ASSESSMENT:    Ms. Radha De Anda is s/p right TKA and presents with decreased weight bearing on right LE and decreased independence with functional mobility and activities of daily living. Patient completed shower and dressing as charter below in ADL grid and is ambulating with rolling walker and stand by assist.  Patient has met 4/4 goals and plans to return home with good family support. Family able to provide patient with appropriate level of assistance at this time. OT reviewed safety precautions throughout session and therapy schedule for the remainder of today.   Patient instructed to call for assistance when needing to get up from recliner and all needs in reach. Patient verbalized understanding of call light. This section established at most recent assessment   PROBLEM LIST (Impairments causing functional limitations):  1. Decreased Strength  2. Decreased ADL/Functional Activities  3. Decreased Transfer Abilities  4. Increased Pain  5. Increased Fatigue  6. Decreased Flexibility/Joint Mobility  7. Decreased Knowledge of Precautions   INTERVENTIONS PLANNED: (Benefits and precautions of occupational therapy have been discussed with the patient.)  1. Activities of daily living training  2. Adaptive equipment training  3. Balance training  4. Clothing management  5. Donning&doffing training  6. Theraputic activity     TREATMENT PLAN: Frequency/Duration: Follow patient 1-2tx to address above goals. Rehabilitation Potential For Stated Goals: Excellent     RECOMMENDED REHABILITATION/EQUIPMENT: (at time of discharge pending progress): Home Health: Physical Therapy. OCCUPATIONAL PROFILE AND HISTORY:   History of Present Injury/Illness (Reason for Referral): Pt presents this date s/p (Right) TKA. Past Medical History/Comorbidities:   Ms. Juwan Mccain  has a past medical history of Adverse effect of anesthesia; Allergic rhinitis; Asthma; Autoimmune disease ([address] DDD (degenerative disc disease), lumbar; Depression; Diabetes ([address] Diverticulitis (2016); Fibromyalgia; Fracture (2016); GERD (gastroesophageal reflux disease); Herpes genitalis in women; Hypertension; IBS (irritable bowel syndrome); Morbid obesity ([address] Primary hypothyroidism; PUD (peptic ulcer disease); Sleep apnea; and Status post right knee replacement (7/25/2018). She also has no past medical history of Aneurysm ([address] Arrhythmia; CAD (coronary artery disease); Cancer ([address] Chronic kidney disease; Chronic obstructive pulmonary disease ([address] Chronic pain; Coagulation disorder ([address]  Difficult intubation; Endocarditis; Heart failure ([facility] Liver disease; Malignant hyperthermia due to anesthesia; Nausea & vomiting; Nicotine vapor product user; Non-nicotine vapor product user; Pseudocholinesterase deficiency; Rheumatic fever; Seizures ([facility] Stroke [facility]); or Thromboembolus ([facility] Ms. Darlene Sunshine  has a past surgical history that includes hx hysterectomy; hx tubal ligation; hx colonoscopy (09/2016); and hx endoscopy. Social History/Living Environment:   Home Environment: Private residence  # Steps to Enter: 1  One/Two Story Residence: Two story  # of Interior Steps: 12  Interior Rails: Left  Lift Chair Available: No  Living Alone: No  Support Systems: Family member(s)  Patient Expects to be Discharged to[de-identified] Private residence  Current DME Used/Available at Home: [facility]  Prior Level of Function/Work/Activity:  Independent Prior   Number of Personal Factors/Comorbidities that affect the Plan of Care: Brief history (0):  LOW COMPLEXITY   ASSESSMENT OF OCCUPATIONAL PERFORMANCE[de-identified]   Most Recent Physical Functioning:   Balance  Sitting: Intact  Standing: Intact; With support                              Mental Status  Neurologic State: Alert  Orientation Level: Oriented X4                Basic ADLs (From Assessment) Complex ADLs (From Assessment)   Basic ADL  Feeding: Independent  Oral Facial Hygiene/Grooming: Setup  Bathing: Minimum assistance  Type of Bath: Chlorhexidine (CHG), Full, Shower  Upper Body Dressing: Setup  Lower Body Dressing: Maximum assistance  Toileting: Maximum assistance     Grooming/Bathing/Dressing Activities of Daily Living   Grooming  Grooming Assistance: Stand-by assistance (standing at sink)     Upper Body Bathing  Bathing Assistance: Supervision/set-up (seated on shower chair)     Lower Body Bathing  Bathing Assistance: Supervision/set-up (seated on shower chair standing as needed with verbal cues) Toileting  Toileting Assistance: Supervision/set up  Cues: Verbal cues provided  Adaptive Equipment: Walker;Elevated seat;Grab bars   Upper Body Dressing Assistance  Dressing Assistance: Supervision/set-up Functional Transfers  Bathroom Mobility: Stand-by assistance  Toilet Transfer : Stand-by assistance  Shower Transfer: Stand-by assistance  Cues: Verbal cues provided  Adaptive Equipment: Grab bars; Walker (comment); Shower chair with back   Lower Body Dressing Assistance  Dressing Assistance: Minimum assistance (pants and underpants) Bed/Mat Mobility  Supine to Sit: Contact guard assistance  Sit to Stand: Contact guard assistance  Bed to Chair: Contact guard assistance         Physical Skills Involved:  1. Range of Motion  2. Balance  3. Strength Cognitive Skills Affected (resulting in the inability to perform in a timely and safe manner):  1. NT Psychosocial Skills Affected:  1. NT   Number of elements that affect the Plan of Care: 1-3:  LOW COMPLEXITY   CLINICAL DECISION MAKIN[address] [place] [address] AM-PAC 6 Clicks   Daily Activity Inpatient Short Form  How much help from another person does the patient currently need. .. Total A Lot A Little None   1. Putting on and taking off regular lower body clothing? [] 1   [] 2   [x] 3   [] 4   2. Bathing (including washing, rinsing, drying)? [] 1   [] 2   [] 3   [x] 4   3. Toileting, which includes using toilet, bedpan or urinal?   [] 1   [] 2   [] 3   [x] 4   4. Putting on and taking off regular upper body clothing? [] 1   [] 2   [] 3   [x] 4   5. Taking care of personal grooming such as brushing teeth? [] 1   [] 2   [] 3   [x] 4   6. Eating meals? [] 1   [] 2   [] 3   [x] 4   © , Trustees of [address] [place] [address], under license to [employer]. All rights reserved     Score:  Initial: 18 Most Recent:23 (Date: 2018 )    Interpretation of Tool:  Represents activities that are increasingly more difficult (i.e. Bed mobility, Transfers, Gait).    Score 24 23 22-20 19-15 14-10 9-7 6     Modifier CH CI CJ CK CL CM CN ? Self Care:     - CURRENT STATUS: CK - 40%-59% impaired, limited or restricted    - GOAL STATUS: CJ - 20%-39% impaired, limited or restricted    - D/C STATUS:  CI - 1%-19% impaired, limited or restricted  Payor: Zacarias Davis / Plan: [address] HMO / Product Type: Managed Care Medicare /      Medical Necessity:     · Skilled intervention continues to be required due to Deficits listed above. Reason for Services/Other Comments:  · Patient continues to require skilled intervention due to new TKA. Use of outcome tool(s) and clinical judgement create a POC that gives a: LOW COMPLEXITY            TREATMENT:   (In addition to Assessment/Re-Assessment sessions the following treatments were rendered)     Pre-treatment Symptoms/Complaints:  None during self care  Pain: Initial:   Pain Intensity 1: 0  Post Session:  0     Self Care: (40min): Procedure(s) (per grid) utilized to improve and/or restore self-care/home management as related to dressing, bathing, toileting and grooming. Required minimal verbal and   cueing to facilitate activities of daily living skills and compensatory activities. Treatment/Session Assessment:     Response to Treatment:  Good, sitting up in recliner. Education:  [] Home Exercises  [x] Fall Precautions  [] Hip Precautions [] Going Home Video  [x] Knee/Hip Prosthesis Review  [x] Walker Management/Safety [x] Adaptive Equipment as Needed       Interdisciplinary Collaboration:   o Physical Therapist  o Occupational Therapist  o Registered Nurse    After treatment position/precautions:   o Up in chair  o Bed/Chair-wheels locked  o Call light within reach  o RN notified     Compliance with Program/Exercises: compliant all of the time. Recommendations/Intent for next treatment session: Pt doing well all goals met and will do well at home with support from . Patient will be discharged home with home health PT.  No further Occupation Therapy warranted, will discharge Occupational Therapy services.       Total Treatment Duration:  OT Patient Time In/Time Out  Time In: 0715  Time Out: [address] [facility],

## 2018-07-26 NOTE — PROGRESS NOTES
Given prescriptions for dilaudid and aspirin and instructed how to take. Has follow up appt already scheduled with Dr Addie Hopkins. Home therapy to see pt. Instructed to call doctor if having fever, excessive drainage, numbness or other problems. I have reviewed discharge instructions with the patient and sister. The patient and sister verbalized understanding. Discharged home. Taken to car via wheelchair.

## 2018-07-26 NOTE — PROGRESS NOTES
Patient is A&Ox4. Able to verbalize needs. Resting quietly with no distress noted. Shift assessment completed. Dressing to surgical site is dry and intact. Neurovascular and peripheral vascular checks WNL. Arrieta draining clear yellow urine to bag. PIV infusing without difficulty. PIV dressing intact with no s/s of infection. Iceman applied to knee. Denies needs. Bed low and locked. Call light within reach. Instructed to call for assistance. Patient verbalizes understanding. Will continue to monitor.

## 2018-07-26 NOTE — PROGRESS NOTES
2018         Post Op day: 1 Day Post-OpProcedure(s) (LRB):  RIGHT KNEE ARTHROPLASTY TOTAL / DARIAN / FNB (Right)      Admit Date: 2018  Admit Diagnosis: Primary osteoarthritis of right knee [M17.11]    LAB:    Recent Results (from the past 24 hour(s))   HEMOGLOBIN    Collection Time: 18  7:11 PM   Result Value Ref Range    HGB 10.0 (L) 11.7 - 15.4 g/dL   HEMOGLOBIN    Collection Time: 18  3:54 AM   Result Value Ref Range    HGB 9.4 (L) 11.7 - 15.4 g/dL   HEMOGLOBIN A1C WITH EAG    Collection Time: 18  3:54 AM   Result Value Ref Range    Hemoglobin A1c 5.3 4.8 - 6.0 %    Est. average glucose 007 mg/dL   METABOLIC PANEL, BASIC    Collection Time: 18  3:54 AM   Result Value Ref Range    Sodium 138 136 - 145 mmol/L    Potassium 4.5 3.5 - 5.1 mmol/L    Chloride 105 98 - 107 mmol/L    CO2 24 21 - 32 mmol/L    Anion gap 9 7 - 16 mmol/L    Glucose 127 (H) 65 - 100 mg/dL    BUN 32 (H) 8 - 23 MG/DL    Creatinine 1.48 (H) 0.6 - 1.0 MG/DL    GFR est AA 46 (L) >60 ml/min/1.73m2    GFR est non-AA 38 (L) >60 ml/min/1.73m2    Calcium 7.6 (L) 8.3 - 10.4 MG/DL     Vital Signs:    Patient Vitals for the past 8 hrs:   BP Temp Pulse Resp SpO2   18 0312 117/81 97.6 °F (36.4 °C) 90 16 92 %     Temp (24hrs), Av.5 °F (36.4 °C), Min:96 °F (35.6 °C), Max:98.6 °F (37 °C)    Body mass index is 43.44 kg/(m^2).   Pain Control:   Pain Assessment  Pain Scale 1: Numeric (0 - 10)  Pain Intensity 1: 4  Pain Onset 1: years ago  Pain Location 1: Knee  Pain Orientation 1: Right  Pain Description 1: Aching  Pain Intervention(s) 1: Medication (see MAR), Ice    Subjective: Doing well, No complaints, No SOB, No Chest Pain, No Nausea or Vomitting     Objective: Vital Signs are Stable, No Acute Distress, Alert and Oriented, Dressing is Dry,  Neurovascular exam is normal.       PT/OT:            Assistive Device: Walker (comment)  RLE AROM  R Knee Flexion: 60  R Knee Extension: 10             Weight Bearing Status: WBAT    Meds:  [unfilled]  [unfilled]  [unfilled]    Assessment:   Patient Active Problem List   Diagnosis Code    DDD (degenerative disc disease), lumbar M51.36    Asthma J45.909    Hypertension I10    IBS (irritable bowel syndrome) K58.9    Gastroesophageal reflux disease without esophagitis K21.9    Right ACL tear S83.511A    Fibromyalgia M79.7    Seasonal allergic rhinitis J30.2    Depression F32.9    Anxiety F41.9    Insomnia G47.00    Primary hypothyroidism E03.9    Vitamin D deficiency E55.9    Osteoarthritis of right knee M17.11    Obesity, morbid, BMI 40.0-49.9 ([facility]) E66.01    Chronic fatigue R53.82    Blood glucose elevated R73.9    Genital herpes simplex A60.00    Status post right knee replacement Z96.651             Plan: Continue Physical Therapy, Monitor  Hbg, D/C to home, No NSAIDs d/t elevated Cr and decreased GFR        Signed By: Brinda Galeana NP

## 2018-07-26 NOTE — PROGRESS NOTES
Problem: Mobility Impaired (Adult and Pediatric)  Goal: *Acute Goals and Plan of Care (Insert Text)  GOALS (1-4 days):  (1.)Ms. Denise will move from supine to sit and sit to supine  in bed with STAND BY ASSIST.  (2.)Ms. Denise will transfer from bed to chair and chair to bed with STAND BY ASSIST using the least restrictive device. 7/26  (3.)Ms. Denise will ambulate with STAND BY ASSIST for 150 feet with the least restrictive device. (4.)Ms. Denise will ambulate up/down 1 steps with bilateral  railing with CONTACT GUARD ASSIST or with  Device. 7/26  (5.)Ms. Denise will increase right knee ROM to 5°-80°.  ________________________________________________________________________________________________      PHYSICAL THERAPY [facility]: Daily Note, Discharge, PM 7/26/2018  INPATIENT: Hospital Day: 2  Payor: Gilmer Schwab / Plan: [address] [place] [address] HMO / Product Type: Managed Care Medicare /      NAME/AGE/GENDER: Romana Saldivar is a 61 y.o. female   PRIMARY DIAGNOSIS:  Primary osteoarthritis of right knee [M17.11]   Procedure(s) and Anesthesia Type:     * RIGHT KNEE ARTHROPLASTY TOTAL / [facility] / [facility] - Spinal (Right)  ICD-10: Treatment Diagnosis:    · Pain in Right Knee (M25.561)  · Stiffness of Right Knee, Not elsewhere classified (M25.661)  · Difficulty in walking, Not elsewhere classified (R26.2)      ASSESSMENT:     Ms. Francesca Mak has done well with TK exercises and gait. She is ready for D/C. Ms. Anibal Colón functional progress occurred more rapidly than expected as evidenced by goal attainment. She will be discharged to her home environment with a PT HEP, assistive device(s), and Home Health PT services. This section established at most recent assessment   PROBLEM LIST (Impairments causing functional limitations):  1. Decreased Strength  2. Decreased ADL/Functional Activities  3. Decreased Transfer Abilities  4. Decreased Ambulation Ability/Technique  5. Decreased Balance  6.  Increased Pain  7. Decreased Activity Tolerance  8. Decreased Flexibility/Joint Mobility  9. Decreased Fisher with Home Exercise Program   INTERVENTIONS PLANNED: (Benefits and precautions of physical therapy have been discussed with the patient.)  1. Bed Mobility  2. Gait Training  3. Home Exercise Program (HEP)  4. Therapeutic Exercise/Strengthening  5. Transfer Training  6. Range of Motion: active/assisted/passive  7. Therapeutic Activities  8. Group Therapy     TREATMENT PLAN: Frequency/Duration: Follow patient BID for duration of hospital stay to address above goals. Rehabilitation Potential For Stated Goals: Good     RECOMMENDED REHABILITATION/EQUIPMENT: (at time of discharge pending progress): Continue Skilled Therapy and Home Health: Physical Therapy. HISTORY:   History of Present Injury/Illness (Reason for Referral):  S/p right tka  Past Medical History/Comorbidities:   Ms. Erinn Hernandez  has a past medical history of Adverse effect of anesthesia; Allergic rhinitis; Asthma; Autoimmune disease ([address] DDD (degenerative disc disease), lumbar; Depression; Diabetes ([address] Diverticulitis (2016); Fibromyalgia; Fracture (2016); GERD (gastroesophageal reflux disease); Herpes genitalis in women; Hypertension; IBS (irritable bowel syndrome); Morbid obesity ([address] Primary hypothyroidism; PUD (peptic ulcer disease); Sleep apnea; and Status post right knee replacement (7/25/2018). She also has no past medical history of Aneurysm ([address] Arrhythmia; CAD (coronary artery disease); Cancer ([address] Chronic kidney disease; Chronic obstructive pulmonary disease ([address] Chronic pain; Coagulation disorder ([address] Difficult intubation; Endocarditis; Heart failure ([address] Liver disease; Malignant hyperthermia due to anesthesia; Nausea & vomiting; Nicotine vapor product user; Non-nicotine vapor product user; Pseudocholinesterase deficiency; Rheumatic fever; Seizures ([address] Stroke [facility]); or Thromboembolus ([address]   Ms. Erinn Hernandez  has a past surgical history that includes hx hysterectomy; hx tubal ligation; hx colonoscopy (2016); and hx endoscopy. Social History/Living Environment:   Home Environment: Private residence  # Steps to Enter: 1  One/Two Story Residence: Two story  # of Interior Steps: 12  Interior Rails: Left  Lift Chair Available: No  Living Alone: No  Support Systems: Family member(s)  Patient Expects to be Discharged to[de-identified] Private residence  Current DME Used/Available at Home: Cane, straight  Prior Level of Function/Work/Activity:  Independent, cane with gait   Number of Personal Factors/Comorbidities that affect the Plan of Care: 3+: HIGH COMPLEXITY   EXAMINATION:   Most Recent Physical Functioning:               RLE AROM  R Knee Flexion: 68  R Knee Extension: 5            Bed Mobility  Supine to Sit: Contact guard assistance    Transfers  Sit to Stand: Stand-by assistance  Stand to Sit: Stand-by assistance  Bed to Chair: Stand-by assistance    Balance  Sitting: Intact  Standing: Intact; With support              Weight Bearing Status  Right Side Weight Bearing: As tolerated  Distance (ft): 80 Feet (ft) (80)  Ambulation - Level of Assistance: Stand-by assistance  Assistive Device: Walker, rolling  Speed/Sheila: Delayed  Step Length: Left shortened;Right shortened  Stance: Right decreased  Gait Abnormalities: Antalgic  Number of Stairs Trained: 6  Stairs - Level of Assistance: Stand-by assistance  Rail Use: Both  Interventions: Safety awareness training     Braces/Orthotics:     Right Knee Cold  Type: Cryocuff      Body Structures Involved:  1. Bones  2. Joints  3. Muscles  4. Ligaments Body Functions Affected:  1. Movement Related Activities and Participation Affected:  1.  Mobility   Number of elements that affect the Plan of Care: 3: MODERATE COMPLEXITY   CLINICAL PRESENTATION:   Presentation: Stable and uncomplicated: LOW COMPLEXITY   CLINICAL DECISION MAKIN [facility] [address] [id number] [address] Inpatient Short Form  How much difficulty does the patient currently have. .. Unable A Lot A Little None   1. Turning over in bed (including adjusting bedclothes, sheets and blankets)? [] 1   [] 2   [x] 3   [] 4   2. Sitting down on and standing up from a chair with arms ( e.g., wheelchair, bedside commode, etc.)   [] 1   [] 2   [x] 3   [] 4   3. Moving from lying on back to sitting on the side of the bed? [] 1   [] 2   [x] 3   [] 4   How much help from another person does the patient currently need. .. Total A Lot A Little None   4. Moving to and from a bed to a chair (including a wheelchair)? [] 1   [] 2   [x] 3   [] 4   5. Need to walk in hospital room? [] 1   [] 2   [x] 3   [] 4   6. Climbing 3-5 steps with a railing? [] 1   [] 2   [x] 3   [] 4   © 2007, Trustees of [address] [place] [address], under license to [employer]. All rights reserved        Score:  Initial: 18 Most Recent: X (Date: -- )    Interpretation of Tool:  Represents activities that are increasingly more difficult (i.e. Bed mobility, Transfers, Gait). Score 24 23 22-20 19-15 14-10 9-7 6     Modifier CH CI CJ CK CL CM CN      ? Mobility - Walking and Moving Around:     - CURRENT STATUS: CK - 40%-59% impaired, limited or restricted    - GOAL STATUS: CJ - 20%-39% impaired, limited or restricted    - D/C STATUS:  ---------------To be determined---------------  Payor: Samia Galeas / Plan: [address] [place] [address] HMO / Product Type: Managed Care Medicare /      Medical Necessity:     · Patient is expected to demonstrate progress in strength, range of motion and balance to decrease assistance required with theraputic exercises and functional mobility. Reason for Services/Other Comments:  · Patient continues to require present interventions due to patient's inability to perform theraputic exercises and functional mobility independently.    Use of outcome tool(s) and clinical judgement create a POC that gives a: Clear prediction of patient's progress: LOW COMPLEXITY            TREATMENT:   (In addition to Assessment/Re-Assessment sessions the following treatments were rendered)     Pre-treatment Symptoms/Complaints:  Knee sore  Pain: Initial:   Pain Intensity 1: 0  Post Session:       Therapeutic Exercise: (45 Minutes (group therapy)):  Exercises per grid below to improve mobility and strength. Required minimal visual, verbal and manual cues to promote proper body alignment, promote proper body posture and promote proper body mechanics. Progressed range and repetitions as indicated. Gait Training (15 Minutes):  Gait training to improve and/or restore physical functioning as related to mobility. Ambulated 80 Feet (ft) (80) with Stand-by assistance using a Walker, rolling and minimal Safety awareness training related to their knee position and motion to promote proper body alignment. Date:  7/25 Date:  7/26 Date:     ACTIVITY/EXERCISE AM PM AM PM AM PM   GROUP THERAPY  []  []  []  [x]  []  []   Ankle Pumps  10a 15 15     Quad Sets  10a 15 15     Gluteal Sets  10a 15 15     Hip ABd/ADduction  10a 15 15     Straight Leg Raises   15 15     Knee Slides  10a 15 15     Short Arc Quads   15 15     Long Arc Quads         Chair Slides    15              B = bilateral; AA = active assistive; A = active; P = passive      Treatment/Session Assessment:     Response to Treatment:  Pt. Did well with group therapy    Education:  [x] Home Exercises  [x] Fall Precautions  [] Hip Precautions [] D/C Instruction Review  [x] Knee/Hip Prosthesis Review  [x] Walker Management/Safety [] Adaptive Equipment as Needed       Interdisciplinary Collaboration:   o Registered Nurse    After treatment position/precautions:   o Up in chair  o Bed/Chair-wheels locked  o Bed in low position  o Call light within reach    Compliance with Program/Exercises: Will assess as treatment progresses. No questions.     Recommendations/Intent for next treatment session:D/C home with HHPT.      Total Treatment Duration:  PT Patient Time In/Time Out  Time In: 1300  Time Out: 920 Charlene Irizarry PTA

## 2018-07-26 NOTE — DISCHARGE INSTRUCTIONS
[address] [facility]   Patient Discharge Instructions    Ashley Tejada / [id number] : 1955    Admitted 2018 Discharged: 2018     IF YOU HAVE ANY PROBLEMS ONCE YOU ARE AT HOME CALL THE FOLLOWING NUMBERS:   Main office number: [phone]      Medications    · The medications you are to continue on are listed on the medication reconciliation sheet. · Narcotic pain medications as well as supplemental iron can cause constipation. If this occurs try stopping the narcotic pain medication and/or the iron. · It is important that you take the medication exactly as they are prescribed. · Medications which increase your risk of blood clots are listed to stop for 5 weeks after surgery as well as medications or supplements which increase your risk of bleeding complications. · Keep your medication in the bottles provided by the pharmacist and keep a list of the medication names, dosages, and times to be taken in your wallet. · Do not take other medications without consulting your doctor. Important Information    Do NOT smoke as this will greatly increase your risk of infection! Resume your prehospital diet. If you have excessive nausea or vomitting call your doctor's office     Leg swelling and warmth is normal for 6 months after surgery. If you experience swelling in your leg elevate you leg while laying down with your toes above your heart. If you have sudden onset severe swelling with leg pain call our office. The stitches deep inside take approximately 6 months to dissolve. There will be sharp shooting, stinging and burning pain. This is normal and will resolve between 3-6 months after surgery. Difficulty sleeping is normal following total Knee and Hip replacement. You may try melatonin, an over-the-counter sleep aid or benadryl to help with sleep. Most patients will resume sleeping through the night 8 weeks after surgery. Home Physical Therapy is arranged.  Home Health will contact you within 48 hrs of discharge that you have chosen. If you have not received a call within this time frame please contact that provider you chose. You should be given this information before you leave the hospital.     You are at a risk for falls. Use the rolling walker when walking. Patients who have had a joint replacement should not drive if they are still taking narcotic pain mediation during the daytime hours. Most patients wean themselves off of pain medication within 2-5 weeks after surgery. When to Call the office    - If you have a temperature greater then 101  - Uncontrolled vomiting   - Loose control of your bladder or bowel function  - Are unable to bear any weight   - Need a pain medication refill       DISCHARGE SUMMARY from Nurse    The following personal items collected during your admission are returned to you:   Dental Appliance: Dental Appliances: None  Vision: Visual Aid: Glasses  Hearing Aid:   na  Jewelry: Jewelry: None  Clothing:   self  Other Valuables: Other Valuables: Cell Phone (and ID with Joen Savi)  Valuables sent to safe:   na    PATIENT INSTRUCTIONS:    After general anesthesia or intravenous sedation, for 24 hours or while taking prescription Narcotics:  · Limit your activities  · Do not drive and operate hazardous machinery  · Do not make important personal or business decisions  · Do  not drink alcoholic beverages  · If you have not urinated within 8 hours after discharge, please contact your surgeon on call.     Report the following to your surgeon:  · Excessive pain, swelling, redness or odor of or around the surgical area  · Temperature over 101  · Nausea and vomiting lasting longer than 4 hours or if unable to take medications  · Any signs of decreased circulation or nerve impairment to extremity: change in color, persistent  numbness, tingling, coldness or increase pain  · Any questions, call office @ [phone]      Keep scheduled follow up appointment. If need to change, call office @ [phone]. *  Please give a list of your current medications to your Primary Care Provider. *  Please update this list whenever your medications are discontinued, doses are      changed, or new medications (including over-the-counter products) are added. *  Please carry medication information at all times in case of emergency situations. Total Knee Replacement: What to Expect at [address] [place] [address]    When you leave the hospital, you should be able to move around with a walker or crutches. But you will need someone to help you at home for the next few weeks or until you have more energy and can move around better. If there is no one to help you at home, you may go to a rehabilitation center. You will go home with a bandage and stitches, staples, tissue glue, or tape strips. Change the bandage as your doctor tells you to. If you have stitches or staples, your doctor will remove them 10 to 21 days after your surgery. Glue or tape strips will fall off on their own over time. You may still have some mild pain, and the area may be swollen for 3 to 6 months after surgery. Your knee will continue to improve for 6 to 12 months. You will probably use a walker for 1 to 3 weeks and then use crutches. When you are ready, you can use a cane. You will probably be able to walk on your own in 4 to 8 weeks. You will need to do months of physical rehabilitation (rehab) after a knee replacement. Rehab will help you strengthen the muscles of the knee and help you regain movement. After you recover, your artificial knee will allow you to do normal daily activities with less pain or no pain at all. You may be able to hike, dance, ride a bike, and play golf. Talk to your doctor about whether you can do more strenuous activities. Always tell your caregivers that you have an artificial knee.   How long it will take to walk on your own, return to normal activities, and go back to work depends on your health and how well your rehabilitation (rehab) program goes. The better you do with your rehab exercises, the quicker you will get your strength and movement back. This care sheet gives you a general idea about how long it will take for you to recover. But each person recovers at a different pace. Follow the steps below to get better as quickly as possible. How can you care for yourself at home? Activity    · Rest when you feel tired. You may take a nap, but do not stay in bed all day. When you sit, use a chair with arms. You can use the arms to help you stand up.     · Work with your physical therapist to find the best way to exercise. You may be able to take frequent, short walks using crutches or a walker. What you can do as your knee heals will depend on whether your new knee is cemented or uncemented. You may not be able to do certain things for a while if your new knee is uncemented.     · After your knee has healed enough, you can do more strenuous activities with caution. ¨ You can golf, but use a golf cart, and do not wear shoes with spikes. ¨ You can bike on a flat road or on a stationary bike. Avoid biking up hills. ¨ Your doctor may suggest that you stay away from activities that put stress on your knee. These include tennis or badminton, squash or racquetball, contact sports like football, jumping (such as in basketball), jogging, or running. ¨ Avoid activities where you might fall. These include horseback riding, skiing, and mountain biking.     · Do not sit for more than 1 hour at a time. Get up and walk around for a while before you sit again. If you must sit for a long time, prop up your leg with a chair or footstool. This will help you avoid swelling.     · Ask your doctor when you can drive again. It may take up to 8 weeks after knee replacement surgery before it is safe for you to drive.     · When you get into a car, sit on the edge of the seat.  Then pull in your legs, and turn to face the front.     · You should be able to do many everyday activities 3 to 6 weeks after your surgery. You will probably need to take 4 to 16 weeks off from work. When you can go back to work depends on the type of work you do and how you feel.     · Ask your doctor when it is okay for you to have sex.     · Do not lift anything heavier than 10 pounds and do not lift weights for 12 weeks. Diet    · By the time you leave the hospital, you should be eating your normal diet. If your stomach is upset, try bland, low-fat foods like plain rice, broiled chicken, toast, and yogurt. Your doctor may suggest that you take iron and vitamin supplements.     · Drink plenty of fluids (unless your doctor tells you not to).   · Eat healthy foods, and watch your portion sizes. Try to stay at your ideal weight. Too much weight puts more stress on your new knee.     · You may notice that your bowel movements are not regular right after your surgery. This is common. Try to avoid constipation and straining with bowel movements. You may want to take a fiber supplement every day. If you have not had a bowel movement after a couple of days, ask your doctor about taking a mild laxative. Medicines    · Your doctor will tell you if and when you can restart your medicines. He or she will also give you instructions about taking any new medicines.     · If you take blood thinners, such as warfarin (Coumadin), clopidogrel (Plavix), or aspirin, be sure to talk to your doctor. He or she will tell you if and when to start taking those medicines again. Make sure that you understand exactly what your doctor wants you to do.     · Your doctor may give you a blood-thinning medicine to prevent blood clots. If you take a blood thinner, be sure you get instructions about how to take your medicine safely. Blood thinners can cause serious bleeding problems. This medicine could be in pill form or as a shot (injection).  If a shot is necessary, your doctor will tell you how to do this.     · Be safe with medicines. Take pain medicines exactly as directed. ¨ If the doctor gave you a prescription medicine for pain, take it as prescribed. ¨ If you are not taking a prescription pain medicine, ask your doctor if you can take an over-the-counter medicine. ¨ Plan to take your pain medicine 30 minutes before exercises. It is easier to prevent pain before it starts than to stop it once it has started.     · If you think your pain medicine is making you sick to your stomach:  ¨ Take your medicine after meals (unless your doctor has told you not to). ¨ Ask your doctor for a different pain medicine.     · If your doctor prescribed antibiotics, take them as directed. Do not stop taking them just because you feel better. You need to take the full course of antibiotics. Incision care    · If your doctor told you how to care for your cut (incision), follow your doctor's instructions. You will have a dressing over the cut. A dressing helps the incision heal and protects it. Your doctor will tell you how to take care of this.     · If you did not get instructions, follow this general advice:  ¨ If you have strips of tape on the cut the doctor made, leave the tape on for a week or until it falls off. ¨ If you have stitches or staples, your doctor will tell you when to come back to have them removed. ¨ If you have skin adhesive on the cut, leave it on until it falls off. Skin adhesive is also called glue or liquid stitches. ¨ Change the bandage every day. ¨ Wash the area daily with warm water, and pat it dry. Don't use hydrogen peroxide or alcohol. They can slow healing. ¨ You may cover the area with a gauze bandage if it oozes fluid or rubs against clothing. ¨ You may shower 24 to 48 hours after surgery. Pat the incision dry. Don't swim or take a bath for the first 2 weeks, or until your doctor tells you it is okay.    Exercise    · Your rehab program will give you a number of exercises to do to help you get back your knee's range of motion and strength. Always do them as your therapist tells you. Ice and elevation    · For pain and swelling, put ice or a cold pack on the area for 10 to 20 minutes at a time. Put a thin cloth between the ice and your skin. Other instructions    · Continue to wear your support stockings as your doctor says. These help to prevent blood clots. The length of time that you will have to wear them depends on your activity level and the amount of swelling.     · You have metal pieces in your knee. These may set off some airport metal detectors. Carry a medical alert card that says you have an artificial joint, just in case. Follow-up care is a key part of your treatment and safety. Be sure to make and go to all appointments, and call your doctor if you are having problems. It's also a good idea to know your test results and keep a list of the medicines you take. When should you call for help? Call 911 anytime you think you may need emergency care. For example, call if:    · You passed out (lost consciousness).     · You have severe trouble breathing.     · You have sudden chest pain and shortness of breath, or you cough up blood.    Call your doctor now or seek immediate medical care if:    · You have signs of infection, such as:  ¨ Increased pain, swelling, warmth, or redness. ¨ Red streaks leading from the incision. ¨ Pus draining from the incision. ¨ A fever.     · You have signs of a blood clot, such as:  ¨ Pain in your calf, back of the knee, thigh, or groin. ¨ Redness and swelling in your leg or groin.     · Your incision comes open and begins to bleed, or the bleeding increases.     · You have pain that does not get better after you take pain medicine.    Watch closely for changes in your health, and be sure to contact your doctor if:    · You do not have a bowel movement after taking a laxative.    Where can you learn more?  Go to http://amna-rodger.info/. Enter J857 in the search box to learn more about \"Total Knee Replacement: What to Expect at Home. \"  Current as of: November 29, 2017  Content Version: 11.7  © 3724-3019 [employer]. Care instructions adapted under license by [employer] (which disclaims liability or warranty for this information). If you have questions about a medical condition or this instruction, always ask your healthcare professional. [address] any warranty or liability for your use of this information. These are general instructions for a healthy lifestyle:    No smoking/ No tobacco products/ Avoid exposure to second hand smoke    Surgeon General's Warning:  Quitting smoking now greatly reduces serious risk to your health. Obesity, smoking, and sedentary lifestyle greatly increases your risk for illness    A healthy diet, regular physical exercise & weight monitoring are important for maintaining a healthy lifestyle    You may be retaining fluid if you have a history of heart failure or if you experience any of the following symptoms:  Weight gain of 3 pounds or more overnight or 5 pounds in a week, increased swelling in our hands or feet or shortness of breath while lying flat in bed. Please call your doctor as soon as you notice any of these symptoms; do not wait until your next office visit. Recognize signs and symptoms of STROKE:    F-face looks uneven    A-arms unable to move or move even    S-speech slurred or non-existent    T-time-call 911 as soon as signs and symptoms begin-DO NOT go       Back to bed or wait to see if you get better-TIME IS BRAIN. The discharge information has been reviewed with the patient. The patient verbalized understanding. Information obtained by :  I understand that if any problems occur once I am at home I am to contact my physician.     I understand and acknowledge receipt of the instructions indicated above.                                                                                                                                            Physician's or R.N.'s Signature                                                                  Date/Time                                                                                                                                              Patient or Representative Signature                                                          Date/Time

## 2018-07-26 NOTE — PROGRESS NOTES
07/25/18 2250   Oxygen Therapy   O2 Sat (%) 98 %   Pulse via Oximetry 82 beats per minute   O2 Device Room air   O2 Flow Rate (L/min) 0 l/min   Patient placed on continuous sat monitor # 13. Monitor history deleted prior to placing on patient. Pt on room air. Alarms set per protocol.

## 2018-07-27 ENCOUNTER — HOME CARE VISIT (OUTPATIENT)
Dept: SCHEDULING | Facility: HOME HEALTH | Age: 63
End: 2018-07-27
Payer: MEDICARE

## 2018-07-27 VITALS
HEART RATE: 98 BPM | TEMPERATURE: 98.2 F | RESPIRATION RATE: 18 BRPM | SYSTOLIC BLOOD PRESSURE: 142 MMHG | DIASTOLIC BLOOD PRESSURE: 92 MMHG

## 2018-07-27 PROCEDURE — 3331090001 HH PPS REVENUE CREDIT

## 2018-07-27 PROCEDURE — 400013 HH SOC

## 2018-07-27 PROCEDURE — 3331090002 HH PPS REVENUE DEBIT

## 2018-07-27 PROCEDURE — G0151 HHCP-SERV OF PT,EA 15 MIN: HCPCS

## 2018-07-28 PROCEDURE — 3331090002 HH PPS REVENUE DEBIT

## 2018-07-28 PROCEDURE — 3331090001 HH PPS REVENUE CREDIT

## 2018-07-29 PROCEDURE — 3331090002 HH PPS REVENUE DEBIT

## 2018-07-29 PROCEDURE — 3331090001 HH PPS REVENUE CREDIT

## 2018-07-30 ENCOUNTER — PATIENT OUTREACH (OUTPATIENT)
Dept: CASE MANAGEMENT | Age: 63
End: 2018-07-30

## 2018-07-30 ENCOUNTER — HOME CARE VISIT (OUTPATIENT)
Dept: SCHEDULING | Facility: HOME HEALTH | Age: 63
End: 2018-07-30
Payer: MEDICARE

## 2018-07-30 VITALS
RESPIRATION RATE: 16 BRPM | TEMPERATURE: 98.7 F | DIASTOLIC BLOOD PRESSURE: 70 MMHG | SYSTOLIC BLOOD PRESSURE: 119 MMHG | HEART RATE: 86 BPM

## 2018-07-30 PROCEDURE — G0157 HHC PT ASSISTANT EA 15: HCPCS

## 2018-07-30 PROCEDURE — 3331090001 HH PPS REVENUE CREDIT

## 2018-07-30 PROCEDURE — 3331090002 HH PPS REVENUE DEBIT

## 2018-07-31 PROCEDURE — 3331090001 HH PPS REVENUE CREDIT

## 2018-07-31 PROCEDURE — 3331090002 HH PPS REVENUE DEBIT

## 2018-07-31 NOTE — PROGRESS NOTES
Downtime Progress Note for Transcend  Madie Mcgovern RN      Transition of Care Discharge Follow-up Questionnaire   Date/Time of Call:   Patient name:   :   MRN:   JOE#   18 @ 9:20am  Jelani Cardenas  1955  [id number]  [id number] 1st call   What was the patient hospitalized for? Right knee replacement   Does the patient understand his/her diagnosis and/or treatment and what happened during the hospitalization? Yes   Did the patient receive discharge instructions? Yes   Review any discharge instructions (see notes in [facility]). Ask patient if they understand these. Do they have any questions? 18 @ 9:20am- Called to establish relationship. Left a voicemail with my contact information. 18 @ 9:55am- Member returned my call. RN Care  role explained. [employer] Nurse Advise line number given. PT is coming to the home 3 x a week. Pain is under control. No new medications since discharge. Has an f/u with surgeon on 18. No PCP f/u. Advised of the importance of 3-7 day f/u with PCP. She stated she has to work with her sister's work schedule, so she will call any make appt. Advised of s/s of infection at incision site. Explained to call surgeon if she has fever, increase in pain, redness or swelling at incisional site. She voiced understanding. [address] to ask them to reach out to her for PCP appt. .   Were home services ordered (nursing, PT, OT, ST, etc.)? Yes, Physical therapy   If so, has the first visit occurred? If not, why? (Assist with coordination of services if necessary.) Yes   Was any DME ordered? No   If so, has it been received? If not, why?  (Assist with coordination of arranging DME orders if necessary.) N/A   Complete a review of all medications (new, continued and discontinued meds per the D/C instructions and medication tab in [facility]). No new medications   Were all new prescriptions filled?   If not, why?  (Assist with obtainment of medications if necessary.) n/a   Does the patient understand the purpose and dosing instructions for all medications? (If patient has questions, provide explanation and education.) n/a   Does the patient have any problems in performing ADLs? (If patient is unable to perform ADLs - what is the limiting factor(s)? Do they have a support system that can assist? If no support system is present, discuss possible assistance that they may be able to obtain.) Ambulating, getting from a sitting to a standing position   Does the patient have all follow-up appointments scheduled? 7 day f/up with PCP?    7-14 day f/up with specialist?    If f/up has not been made - what actions has the care coordinator made to accomplish this? Has transportation been arranged? [facility] follow-up should be within 7 days of discharge; all others should have PCP follow-up within 7 days of discharge; follow-ups with other specialists should be within 7-14 days of discharge.) Surgeon 8/28/18  Sister transports to [facility]. Any other questions or concerns expressed by the patient? None   Schedule next appointment with MOHIT HILARIO Coordinator or refer to RN Case Manager/  per the workflow guidelines. When is care coordinators next follow-up call scheduled? If referred for CCM - what RN care manager was the referral assigned?  Week of July 30, 2018   MOHIT HILARIO Call Completed By: Hunter Vivas RN

## 2018-08-01 ENCOUNTER — HOME CARE VISIT (OUTPATIENT)
Dept: SCHEDULING | Facility: HOME HEALTH | Age: 63
End: 2018-08-01
Payer: MEDICARE

## 2018-08-01 PROCEDURE — 3331090001 HH PPS REVENUE CREDIT

## 2018-08-01 PROCEDURE — G0157 HHC PT ASSISTANT EA 15: HCPCS

## 2018-08-01 PROCEDURE — 3331090002 HH PPS REVENUE DEBIT

## 2018-08-02 PROCEDURE — 3331090002 HH PPS REVENUE DEBIT

## 2018-08-02 PROCEDURE — 3331090001 HH PPS REVENUE CREDIT

## 2018-08-03 ENCOUNTER — HOME CARE VISIT (OUTPATIENT)
Dept: SCHEDULING | Facility: HOME HEALTH | Age: 63
End: 2018-08-03
Payer: MEDICARE

## 2018-08-03 VITALS
DIASTOLIC BLOOD PRESSURE: 80 MMHG | RESPIRATION RATE: 16 BRPM | HEART RATE: 70 BPM | SYSTOLIC BLOOD PRESSURE: 130 MMHG | TEMPERATURE: 98.6 F

## 2018-08-03 PROCEDURE — 3331090001 HH PPS REVENUE CREDIT

## 2018-08-03 PROCEDURE — G0157 HHC PT ASSISTANT EA 15: HCPCS

## 2018-08-03 PROCEDURE — 3331090002 HH PPS REVENUE DEBIT

## 2018-08-04 PROCEDURE — 3331090002 HH PPS REVENUE DEBIT

## 2018-08-04 PROCEDURE — 3331090001 HH PPS REVENUE CREDIT

## 2018-08-05 PROCEDURE — 3331090001 HH PPS REVENUE CREDIT

## 2018-08-05 PROCEDURE — 3331090002 HH PPS REVENUE DEBIT

## 2018-08-06 ENCOUNTER — PATIENT OUTREACH (OUTPATIENT)
Dept: CASE MANAGEMENT | Age: 63
End: 2018-08-06

## 2018-08-06 ENCOUNTER — HOME CARE VISIT (OUTPATIENT)
Dept: SCHEDULING | Facility: HOME HEALTH | Age: 63
End: 2018-08-06
Payer: MEDICARE

## 2018-08-06 VITALS
DIASTOLIC BLOOD PRESSURE: 90 MMHG | HEART RATE: 78 BPM | RESPIRATION RATE: 16 BRPM | TEMPERATURE: 98 F | SYSTOLIC BLOOD PRESSURE: 140 MMHG

## 2018-08-06 PROCEDURE — 3331090001 HH PPS REVENUE CREDIT

## 2018-08-06 PROCEDURE — G0157 HHC PT ASSISTANT EA 15: HCPCS

## 2018-08-06 PROCEDURE — 3331090002 HH PPS REVENUE DEBIT

## 2018-08-06 PROCEDURE — A4649 SURGICAL SUPPLIES: HCPCS

## 2018-08-06 NOTE — PROGRESS NOTES
8/6/18 @ 11:58am- Called to check on member. She states she needs a home health aide to assist with adl's. She is living with her sister who works 3rd shift, so she is sleeping during the day and cannot help her. She stated that PT had been visiting but she had not seen Home health. Called [facility] home health and spoke with Nichol. She stated they needed an order for an aide. I ask her for her fax number and also ask her when a [facility] nurse would be visiting. She stated today. Called member back and left a voicemail that I was calling Dr. Cathryn Ramos for order and that home heat nurse should be contacting her today. Called Dr. Cathryn Ramos office and left a voicemail with his nurse r/t aide order with fax number and my contact information.

## 2018-08-06 NOTE — PATIENT INSTRUCTIONS
8/6/18 @ 11:58am- Called to check on member. She states she needs a home health aide to assist with adl's. She is living with her sister who works 3rd shift, so she is sleeping during the day and cannot help her. She stated that PT had been visiting but she had not seen Home health. Called [facility] home health and spoke with Nichol. She stated they needed an order for an aide. I ask her for her fax number and also ask her when a [facility] nurse would be visiting. She stated today. Called member back and left a voicemail that I was calling Dr. Arely Colon for order and that home heatl nurse should be contacting her today. Called Dr. Arely Colon office and left a voicemail with his nurse r/t aide order with fax number and my contact information.

## 2018-08-07 PROCEDURE — 3331090001 HH PPS REVENUE CREDIT

## 2018-08-07 PROCEDURE — 3331090002 HH PPS REVENUE DEBIT

## 2018-08-08 ENCOUNTER — HOME CARE VISIT (OUTPATIENT)
Dept: SCHEDULING | Facility: HOME HEALTH | Age: 63
End: 2018-08-08
Payer: MEDICARE

## 2018-08-08 PROCEDURE — 3331090001 HH PPS REVENUE CREDIT

## 2018-08-08 PROCEDURE — 3331090002 HH PPS REVENUE DEBIT

## 2018-08-08 PROCEDURE — G0157 HHC PT ASSISTANT EA 15: HCPCS

## 2018-08-09 ENCOUNTER — HOME CARE VISIT (OUTPATIENT)
Dept: HOME HEALTH SERVICES | Facility: HOME HEALTH | Age: 63
End: 2018-08-09
Payer: MEDICARE

## 2018-08-09 PROCEDURE — 3331090002 HH PPS REVENUE DEBIT

## 2018-08-09 PROCEDURE — 3331090001 HH PPS REVENUE CREDIT

## 2018-08-10 ENCOUNTER — HOME CARE VISIT (OUTPATIENT)
Dept: SCHEDULING | Facility: HOME HEALTH | Age: 63
End: 2018-08-10
Payer: MEDICARE

## 2018-08-10 ENCOUNTER — HOME CARE VISIT (OUTPATIENT)
Dept: HOME HEALTH SERVICES | Facility: HOME HEALTH | Age: 63
End: 2018-08-10
Payer: MEDICARE

## 2018-08-10 VITALS
HEART RATE: 78 BPM | SYSTOLIC BLOOD PRESSURE: 132 MMHG | DIASTOLIC BLOOD PRESSURE: 80 MMHG | RESPIRATION RATE: 16 BRPM | TEMPERATURE: 97.6 F

## 2018-08-10 PROCEDURE — G0157 HHC PT ASSISTANT EA 15: HCPCS

## 2018-08-10 PROCEDURE — 3331090002 HH PPS REVENUE DEBIT

## 2018-08-10 PROCEDURE — 3331090001 HH PPS REVENUE CREDIT

## 2018-08-11 PROCEDURE — 3331090001 HH PPS REVENUE CREDIT

## 2018-08-11 PROCEDURE — 3331090002 HH PPS REVENUE DEBIT

## 2018-08-12 PROCEDURE — 3331090001 HH PPS REVENUE CREDIT

## 2018-08-12 PROCEDURE — 3331090002 HH PPS REVENUE DEBIT

## 2018-08-13 PROCEDURE — 3331090002 HH PPS REVENUE DEBIT

## 2018-08-13 PROCEDURE — 3331090001 HH PPS REVENUE CREDIT

## 2018-08-14 ENCOUNTER — PATIENT OUTREACH (OUTPATIENT)
Dept: CASE MANAGEMENT | Age: 63
End: 2018-08-14

## 2018-08-14 ENCOUNTER — HOME CARE VISIT (OUTPATIENT)
Dept: SCHEDULING | Facility: HOME HEALTH | Age: 63
End: 2018-08-14
Payer: MEDICARE

## 2018-08-14 PROCEDURE — 3331090001 HH PPS REVENUE CREDIT

## 2018-08-14 PROCEDURE — G0157 HHC PT ASSISTANT EA 15: HCPCS

## 2018-08-14 PROCEDURE — 3331090002 HH PPS REVENUE DEBIT

## 2018-08-14 NOTE — PATIENT INSTRUCTIONS
8/14/18 @ 3:49pm- Member returned my call. She is doing so much better. Home PT and OT visiting, she had aide coming for a few visits but states she no longer needs them. She is amublating and able to climb stairs. She received the well dine meals and really appreciated them. Her pain is under control and she is has no complaints or issues on this call. She has a f/u appt with Dr. Luo Friend on 9/27/18. No fevers, redness or edema at incision site. Instructed her to call surgeon if she develops any s/s of infection. She voiced understanding.

## 2018-08-14 NOTE — PROGRESS NOTES
8/14/18 @ 3:49pm- Member returned my call. She is doing so much better. Home PT and OT visiting, she had aide coming for a few visits but states she no longer needs them. She is amublating and able to climb stairs. She received the well dine meals and really appreciated them. Her pain is under control and she is has no complaints or issues on this call. She has a f/u appt with Dr. Amrita Chapman on 9/27/18. No fevers, redness or edema at incision site. Instructed her to call surgeon if she develops any s/s of infection. She voiced understanding.

## 2018-08-15 PROCEDURE — 3331090002 HH PPS REVENUE DEBIT

## 2018-08-15 PROCEDURE — 3331090001 HH PPS REVENUE CREDIT

## 2018-08-16 ENCOUNTER — HOME CARE VISIT (OUTPATIENT)
Dept: SCHEDULING | Facility: HOME HEALTH | Age: 63
End: 2018-08-16
Payer: MEDICARE

## 2018-08-16 PROCEDURE — 3331090002 HH PPS REVENUE DEBIT

## 2018-08-16 PROCEDURE — G0151 HHCP-SERV OF PT,EA 15 MIN: HCPCS

## 2018-08-16 PROCEDURE — 3331090001 HH PPS REVENUE CREDIT

## 2018-08-17 VITALS
HEART RATE: 92 BPM | SYSTOLIC BLOOD PRESSURE: 160 MMHG | TEMPERATURE: 97.6 F | DIASTOLIC BLOOD PRESSURE: 94 MMHG | RESPIRATION RATE: 18 BRPM

## 2018-08-31 ENCOUNTER — PATIENT OUTREACH (OUTPATIENT)
Dept: CASE MANAGEMENT | Age: 63
End: 2018-08-31

## 2018-09-07 ENCOUNTER — PATIENT OUTREACH (OUTPATIENT)
Dept: CASE MANAGEMENT | Age: 63
End: 2018-09-07

## 2018-09-28 PROBLEM — N28.9 RENAL INSUFFICIENCY: Status: ACTIVE | Noted: 2018-09-28

## 2018-10-17 PROBLEM — M17.11 OSTEOARTHRITIS OF RIGHT KNEE: Status: RESOLVED | Noted: 2017-10-11 | Resolved: 2018-10-17

## 2018-10-17 PROBLEM — S83.511A RIGHT ACL TEAR: Status: RESOLVED | Noted: 2017-04-28 | Resolved: 2018-10-17

## 2018-10-17 PROBLEM — D64.9 ANEMIA: Status: ACTIVE | Noted: 2018-10-17

## 2019-02-13 ENCOUNTER — HOSPITAL ENCOUNTER (OUTPATIENT)
Dept: MAMMOGRAPHY | Age: 64
Discharge: HOME | End: 2019-02-13
Attending: NURSE PRACTITIONER
Payer: MEDICARE

## 2019-02-13 DIAGNOSIS — Z12.31 VISIT FOR SCREENING MAMMOGRAM: ICD-10-CM

## 2019-02-13 PROCEDURE — 77067 SCR MAMMO BI INCL CAD: CPT

## 2019-08-29 PROBLEM — F33.0 DEPRESSION, MAJOR, RECURRENT, MILD (HCC): Status: ACTIVE | Noted: 2019-08-29

## 2019-09-24 PROBLEM — N28.9 RENAL INSUFFICIENCY: Status: RESOLVED | Noted: 2018-09-28 | Resolved: 2019-09-24

## 2020-03-05 ENCOUNTER — HOSPITAL ENCOUNTER (OUTPATIENT)
Dept: MAMMOGRAPHY | Age: 65
Discharge: HOME | End: 2020-03-05
Attending: NURSE PRACTITIONER
Payer: MEDICARE

## 2020-03-05 DIAGNOSIS — Z12.39 BREAST SCREENING: ICD-10-CM

## 2020-03-05 PROCEDURE — 77067 SCR MAMMO BI INCL CAD: CPT

## 2020-03-07 NOTE — PROGRESS NOTES
IMPRESSION:  No mammographic evidence of malignancy.        Recommend annual mammogram in one year. A reminder letter will be scheduled.     BI-RADS Assessment Category 2: Benign finding.

## 2020-08-05 ENCOUNTER — HOSPITAL ENCOUNTER (OUTPATIENT)
Dept: ULTRASOUND IMAGING | Age: 65
Discharge: HOME | End: 2020-08-05
Attending: NURSE PRACTITIONER
Payer: MEDICARE

## 2020-08-05 DIAGNOSIS — R10.33 PERIUMBILICAL ABDOMINAL PAIN: ICD-10-CM

## 2020-08-05 PROCEDURE — 76705 ECHO EXAM OF ABDOMEN: CPT

## 2020-08-06 NOTE — PROGRESS NOTES
Report to pt please. Hepatomegaly with diffuse hepatic steatosis and multiple hepatic cysts the largest in the left liver lobe.  Will refer to GI

## 2020-08-07 NOTE — PROGRESS NOTES
Patient notified. Patient asked if she could be prescribed something for gas/bloating before she sees the [place] doctor.   Thanks

## 2021-05-19 ENCOUNTER — HOSPITAL ENCOUNTER (OUTPATIENT)
Dept: MAMMOGRAPHY | Age: 66
Discharge: HOME | End: 2021-05-19
Attending: NURSE PRACTITIONER
Payer: MEDICARE

## 2021-05-19 DIAGNOSIS — Z00.00 MEDICARE ANNUAL WELLNESS VISIT, SUBSEQUENT: ICD-10-CM

## 2021-05-19 DIAGNOSIS — Z78.0 POSTMENOPAUSAL: ICD-10-CM

## 2021-05-19 PROCEDURE — 77067 SCR MAMMO BI INCL CAD: CPT

## 2021-05-19 PROCEDURE — 77080 DXA BONE DENSITY AXIAL: CPT

## 2021-05-19 NOTE — PROGRESS NOTES
IMPRESSION  Using the World Health Organization criteria, the bone mineral density is low. Is she taking her calcium and vitamin D daily? If not, please encourage this.

## 2021-05-21 NOTE — PROGRESS NOTES
IMPRESSION  No mammographic evidence of malignancy.        Recommend annual mammogram in one year.   A reminder letter will be scheduled.     BI-RADS Assessment Category 2: Benign finding.

## 2021-07-08 ENCOUNTER — HOSPITAL ENCOUNTER (OUTPATIENT)
Dept: LAB | Age: 66
Discharge: HOME | End: 2021-07-08

## 2021-07-08 PROCEDURE — 88305 TISSUE EXAM BY PATHOLOGIST: CPT

## 2022-03-18 PROBLEM — K21.9 GASTROESOPHAGEAL REFLUX DISEASE WITHOUT ESOPHAGITIS: Status: ACTIVE | Noted: 2017-04-28

## 2022-03-19 PROBLEM — D64.9 ANEMIA: Status: ACTIVE | Noted: 2018-10-17

## 2022-03-19 PROBLEM — F32.A DEPRESSION: Status: ACTIVE | Noted: 2017-04-28

## 2022-03-19 PROBLEM — G47.00 INSOMNIA: Status: ACTIVE | Noted: 2017-04-28

## 2022-03-19 PROBLEM — E55.9 VITAMIN D DEFICIENCY: Status: ACTIVE | Noted: 2017-06-27

## 2022-03-19 PROBLEM — R73.9 BLOOD GLUCOSE ELEVATED: Status: ACTIVE | Noted: 2018-02-28

## 2022-03-19 PROBLEM — F33.0 DEPRESSION, MAJOR, RECURRENT, MILD (HCC): Status: ACTIVE | Noted: 2019-08-29

## 2022-03-19 PROBLEM — F41.9 ANXIETY: Status: ACTIVE | Noted: 2017-04-28

## 2022-03-19 PROBLEM — Z96.651 STATUS POST RIGHT KNEE REPLACEMENT: Status: ACTIVE | Noted: 2018-07-25

## 2022-03-19 PROBLEM — A60.00 GENITAL HERPES SIMPLEX: Status: ACTIVE | Noted: 2018-06-28

## 2022-03-20 PROBLEM — R53.82 CHRONIC FATIGUE: Status: ACTIVE | Noted: 2017-11-29

## 2022-03-20 PROBLEM — M79.7 FIBROMYALGIA: Status: ACTIVE | Noted: 2017-04-28

## 2022-03-20 PROBLEM — E66.01 OBESITY, MORBID, BMI 40.0-49.9 (HCC): Status: ACTIVE | Noted: 2017-10-11

## 2022-03-20 PROBLEM — J30.2 SEASONAL ALLERGIC RHINITIS: Status: ACTIVE | Noted: 2017-04-28

## 2022-06-01 ENCOUNTER — OFFICE VISIT (OUTPATIENT)
Dept: FAMILY MEDICINE CLINIC | Facility: CLINIC | Age: 67
End: 2022-06-01
Payer: MEDICARE

## 2022-06-01 VITALS
BODY MASS INDEX: 48.1 KG/M2 | TEMPERATURE: 99.6 F | WEIGHT: 261.4 LBS | HEART RATE: 91 BPM | HEIGHT: 62 IN | OXYGEN SATURATION: 97 % | SYSTOLIC BLOOD PRESSURE: 142 MMHG | DIASTOLIC BLOOD PRESSURE: 87 MMHG

## 2022-06-01 DIAGNOSIS — J30.1 ALLERGIC RHINITIS DUE TO POLLEN, UNSPECIFIED SEASONALITY: ICD-10-CM

## 2022-06-01 DIAGNOSIS — I10 PRIMARY HYPERTENSION: ICD-10-CM

## 2022-06-01 DIAGNOSIS — J45.909 MILD ASTHMA WITHOUT COMPLICATION, UNSPECIFIED WHETHER PERSISTENT: ICD-10-CM

## 2022-06-01 DIAGNOSIS — Z12.83 SKIN CANCER SCREENING: ICD-10-CM

## 2022-06-01 DIAGNOSIS — E55.9 VITAMIN D DEFICIENCY: ICD-10-CM

## 2022-06-01 DIAGNOSIS — E03.9 HYPOTHYROIDISM, UNSPECIFIED TYPE: ICD-10-CM

## 2022-06-01 DIAGNOSIS — F33.1 MODERATE EPISODE OF RECURRENT MAJOR DEPRESSIVE DISORDER (HCC): ICD-10-CM

## 2022-06-01 DIAGNOSIS — E78.2 MIXED HYPERLIPIDEMIA: Primary | ICD-10-CM

## 2022-06-01 LAB
25(OH)D3 SERPL-MCNC: 23.2 NG/ML (ref 30–100)
ALBUMIN SERPL-MCNC: 4 G/DL (ref 3.2–4.6)
ALBUMIN/GLOB SERPL: 1.1 {RATIO} (ref 1.2–3.5)
ALP SERPL-CCNC: 133 U/L (ref 50–136)
ALT SERPL-CCNC: 25 U/L (ref 12–65)
ANION GAP SERPL CALC-SCNC: 6 MMOL/L (ref 7–16)
AST SERPL-CCNC: 15 U/L (ref 15–37)
BASOPHILS # BLD: 0 K/UL (ref 0–0.2)
BASOPHILS NFR BLD: 0 % (ref 0–2)
BILIRUB SERPL-MCNC: 0.3 MG/DL (ref 0.2–1.1)
BUN SERPL-MCNC: 16 MG/DL (ref 8–23)
CALCIUM SERPL-MCNC: 9.6 MG/DL (ref 8.3–10.4)
CHLORIDE SERPL-SCNC: 107 MMOL/L (ref 98–107)
CHOLEST SERPL-MCNC: 198 MG/DL
CO2 SERPL-SCNC: 28 MMOL/L (ref 21–32)
CREAT SERPL-MCNC: 0.8 MG/DL (ref 0.6–1)
DIFFERENTIAL METHOD BLD: ABNORMAL
EOSINOPHIL # BLD: 0 K/UL (ref 0–0.8)
EOSINOPHIL NFR BLD: 0 % (ref 0.5–7.8)
ERYTHROCYTE [DISTWIDTH] IN BLOOD BY AUTOMATED COUNT: 13.7 % (ref 11.9–14.6)
GLOBULIN SER CALC-MCNC: 3.5 G/DL (ref 2.3–3.5)
GLUCOSE SERPL-MCNC: 100 MG/DL (ref 65–100)
HCT VFR BLD AUTO: 39.9 % (ref 35.8–46.3)
HDLC SERPL-MCNC: 85 MG/DL (ref 40–60)
HDLC SERPL: 2.3 {RATIO}
HGB BLD-MCNC: 12.9 G/DL (ref 11.7–15.4)
IMM GRANULOCYTES # BLD AUTO: 0 K/UL (ref 0–0.5)
IMM GRANULOCYTES NFR BLD AUTO: 0 % (ref 0–5)
LDLC SERPL CALC-MCNC: 86.2 MG/DL
LYMPHOCYTES # BLD: 1.4 K/UL (ref 0.5–4.6)
LYMPHOCYTES NFR BLD: 28 % (ref 13–44)
MCH RBC QN AUTO: 29.1 PG (ref 26.1–32.9)
MCHC RBC AUTO-ENTMCNC: 32.3 G/DL (ref 31.4–35)
MCV RBC AUTO: 89.9 FL (ref 79.6–97.8)
MONOCYTES # BLD: 0.4 K/UL (ref 0.1–1.3)
MONOCYTES NFR BLD: 9 % (ref 4–12)
NEUTS SEG # BLD: 3.2 K/UL (ref 1.7–8.2)
NEUTS SEG NFR BLD: 63 % (ref 43–78)
NRBC # BLD: 0 K/UL (ref 0–0.2)
PLATELET # BLD AUTO: 269 K/UL (ref 150–450)
PMV BLD AUTO: 10.7 FL (ref 9.4–12.3)
POTASSIUM SERPL-SCNC: 4.6 MMOL/L (ref 3.5–5.1)
PROT SERPL-MCNC: 7.5 G/DL (ref 6.3–8.2)
RBC # BLD AUTO: 4.44 M/UL (ref 4.05–5.2)
SODIUM SERPL-SCNC: 141 MMOL/L (ref 136–145)
TRIGL SERPL-MCNC: 134 MG/DL (ref 35–150)
TSH, 3RD GENERATION: 1.83 UIU/ML (ref 0.36–3.74)
VLDLC SERPL CALC-MCNC: 26.8 MG/DL (ref 6–23)
WBC # BLD AUTO: 5 K/UL (ref 4.3–11.1)

## 2022-06-01 PROCEDURE — 1123F ACP DISCUSS/DSCN MKR DOCD: CPT | Performed by: NURSE PRACTITIONER

## 2022-06-01 PROCEDURE — 99214 OFFICE O/P EST MOD 30 MIN: CPT | Performed by: NURSE PRACTITIONER

## 2022-06-01 RX ORDER — FLUTICASONE PROPIONATE 50 MCG
2 SPRAY, SUSPENSION (ML) NASAL DAILY
Qty: 16 G | Refills: 2 | Status: SHIPPED | OUTPATIENT
Start: 2022-06-01 | End: 2022-09-01 | Stop reason: SDUPTHER

## 2022-06-01 RX ORDER — LEVOTHYROXINE SODIUM 0.1 MG/1
100 TABLET ORAL
Qty: 90 TABLET | Refills: 0 | Status: SHIPPED | OUTPATIENT
Start: 2022-06-01 | End: 2022-09-01 | Stop reason: SDUPTHER

## 2022-06-01 RX ORDER — ATENOLOL 50 MG/1
50 TABLET ORAL DAILY
Qty: 90 TABLET | Refills: 1 | Status: SHIPPED | OUTPATIENT
Start: 2022-06-01 | End: 2022-09-01 | Stop reason: SDUPTHER

## 2022-06-01 RX ORDER — LEVOTHYROXINE SODIUM 88 UG/1
88 TABLET ORAL
Qty: 90 TABLET | Refills: 0 | Status: SHIPPED | OUTPATIENT
Start: 2022-06-01 | End: 2022-09-01 | Stop reason: SDUPTHER

## 2022-06-01 RX ORDER — ALPRAZOLAM 0.5 MG/1
0.5 TABLET ORAL DAILY PRN
Status: CANCELLED | OUTPATIENT
Start: 2022-06-01

## 2022-06-01 RX ORDER — ALBUTEROL SULFATE 90 UG/1
2 AEROSOL, METERED RESPIRATORY (INHALATION) EVERY 6 HOURS PRN
Qty: 18 G | Refills: 1 | Status: SHIPPED | OUTPATIENT
Start: 2022-06-01 | End: 2022-09-01 | Stop reason: SDUPTHER

## 2022-06-01 RX ORDER — ALBUTEROL SULFATE 2.5 MG/3ML
2.5 SOLUTION RESPIRATORY (INHALATION) EVERY 6 HOURS PRN
Qty: 50 EACH | Refills: 1 | Status: SHIPPED | OUTPATIENT
Start: 2022-06-01 | End: 2022-09-01 | Stop reason: SDUPTHER

## 2022-06-01 RX ORDER — LOSARTAN POTASSIUM 50 MG/1
50 TABLET ORAL DAILY
Qty: 90 TABLET | Refills: 1 | Status: SHIPPED | OUTPATIENT
Start: 2022-06-01 | End: 2022-09-01 | Stop reason: SDUPTHER

## 2022-06-01 RX ORDER — MONTELUKAST SODIUM 10 MG/1
10 TABLET ORAL DAILY
Qty: 90 TABLET | Refills: 1 | Status: SHIPPED | OUTPATIENT
Start: 2022-06-01 | End: 2022-09-01 | Stop reason: SDUPTHER

## 2022-06-01 RX ORDER — VENLAFAXINE HYDROCHLORIDE 150 MG/1
300 CAPSULE, EXTENDED RELEASE ORAL DAILY
Qty: 30 CAPSULE | Status: CANCELLED | OUTPATIENT
Start: 2022-06-01

## 2022-06-01 RX ORDER — LORATADINE 10 MG/1
10 TABLET ORAL DAILY
Qty: 90 TABLET | Refills: 1 | Status: SHIPPED | OUTPATIENT
Start: 2022-06-01 | End: 2022-09-01 | Stop reason: SDUPTHER

## 2022-06-01 SDOH — ECONOMIC STABILITY: TRANSPORTATION INSECURITY
IN THE PAST 12 MONTHS, HAS THE LACK OF TRANSPORTATION KEPT YOU FROM MEDICAL APPOINTMENTS OR FROM GETTING MEDICATIONS?: NO

## 2022-06-01 SDOH — ECONOMIC STABILITY: FOOD INSECURITY: WITHIN THE PAST 12 MONTHS, THE FOOD YOU BOUGHT JUST DIDN'T LAST AND YOU DIDN'T HAVE MONEY TO GET MORE.: NEVER TRUE

## 2022-06-01 SDOH — ECONOMIC STABILITY: TRANSPORTATION INSECURITY
IN THE PAST 12 MONTHS, HAS LACK OF TRANSPORTATION KEPT YOU FROM MEETINGS, WORK, OR FROM GETTING THINGS NEEDED FOR DAILY LIVING?: NO

## 2022-06-01 SDOH — ECONOMIC STABILITY: FOOD INSECURITY: WITHIN THE PAST 12 MONTHS, YOU WORRIED THAT YOUR FOOD WOULD RUN OUT BEFORE YOU GOT MONEY TO BUY MORE.: NEVER TRUE

## 2022-06-01 ASSESSMENT — PATIENT HEALTH QUESTIONNAIRE - PHQ9
9. THOUGHTS THAT YOU WOULD BE BETTER OFF DEAD, OR OF HURTING YOURSELF: 0
SUM OF ALL RESPONSES TO PHQ QUESTIONS 1-9: 11
SUM OF ALL RESPONSES TO PHQ QUESTIONS 1-9: 11
2. FEELING DOWN, DEPRESSED OR HOPELESS: 1
SUM OF ALL RESPONSES TO PHQ QUESTIONS 1-9: 11
1. LITTLE INTEREST OR PLEASURE IN DOING THINGS: 2
SUM OF ALL RESPONSES TO PHQ9 QUESTIONS 1 & 2: 3
3. TROUBLE FALLING OR STAYING ASLEEP: 3
5. POOR APPETITE OR OVEREATING: 0
4. FEELING TIRED OR HAVING LITTLE ENERGY: 3
8. MOVING OR SPEAKING SO SLOWLY THAT OTHER PEOPLE COULD HAVE NOTICED. OR THE OPPOSITE, BEING SO FIGETY OR RESTLESS THAT YOU HAVE BEEN MOVING AROUND A LOT MORE THAN USUAL: 0
SUM OF ALL RESPONSES TO PHQ QUESTIONS 1-9: 11
7. TROUBLE CONCENTRATING ON THINGS, SUCH AS READING THE NEWSPAPER OR WATCHING TELEVISION: 1
10. IF YOU CHECKED OFF ANY PROBLEMS, HOW DIFFICULT HAVE THESE PROBLEMS MADE IT FOR YOU TO DO YOUR WORK, TAKE CARE OF THINGS AT HOME, OR GET ALONG WITH OTHER PEOPLE: 1
6. FEELING BAD ABOUT YOURSELF - OR THAT YOU ARE A FAILURE OR HAVE LET YOURSELF OR YOUR FAMILY DOWN: 1

## 2022-06-01 ASSESSMENT — LIFESTYLE VARIABLES
HOW MANY STANDARD DRINKS CONTAINING ALCOHOL DO YOU HAVE ON A TYPICAL DAY: 1 OR 2
HOW OFTEN DO YOU HAVE A DRINK CONTAINING ALCOHOL: MONTHLY OR LESS

## 2022-06-01 ASSESSMENT — ENCOUNTER SYMPTOMS
ALLERGIC/IMMUNOLOGIC NEGATIVE: 1
EYES NEGATIVE: 1
RESPIRATORY NEGATIVE: 1
GASTROINTESTINAL NEGATIVE: 1

## 2022-06-01 ASSESSMENT — SOCIAL DETERMINANTS OF HEALTH (SDOH): HOW HARD IS IT FOR YOU TO PAY FOR THE VERY BASICS LIKE FOOD, HOUSING, MEDICAL CARE, AND HEATING?: NOT VERY HARD

## 2022-06-01 NOTE — PROGRESS NOTES
[address]  11 [facility] Benjamin Salazar, [address] [facility]   (ph) [phone] (fax) [phone]  Lolis CHAPPELL, ARON-C      Chief Complaint   Patient presents with    Follow-up     3 month fu       55-year-old white female comes into the office today to follow-up on chronic conditions. She is followed for hypertension,  GERD, anxiety, and depression. She never scheduled appt to see Gregor Acuña but feels she could possibly use one. She sees her daughter and grandchildren much more and is in great spirits today. Colonoscopy was in July 2021, repeat in 2024. She reports morning headaches have decreased. Still reports fatigue. She reports a hx of mild sleep apnea but has gained some weight over the past several months. She still has not went for the sleep study but promises to schedule it.           Allergies   Allergen Reactions    Latex Other (See Comments)     Burns; blisters    Cephalexin Hives    Lisinopril Other (See Comments)    Penicillins Hives    Sulfa Antibiotics Hives       Past Medical History:   Diagnosis Date    Adverse effect of anesthesia     hard to wake up    Allergic rhinitis     Asthma     Autoimmune disease ([address]     fibromyalgia    DDD (degenerative disc disease), lumbar     Depression     Diabetes ([address]     pre-diabetes    Diverticulitis 2016    diagnosed at Colonoscopy    Fibromyalgia     Fibromyalgia     Fracture 2016    right foot    GERD (gastroesophageal reflux disease)     Herpes genitalis in women     dormant    Hypertension     IBS (irritable bowel syndrome)     Morbid obesity ([address]     Primary hypothyroidism     PUD (peptic ulcer disease)     Sleep apnea     had 2 studies    Status post right knee replacement 7/25/2018       Family History   Problem Relation Age of Onset    Thyroid Disease Daughter         Hypothyroidism    Breast Cancer Neg Hx     Diabetes Sister     Cancer Mother         Lung    GERD Father  Hypertension Father     COPD Father        Social History     Socioeconomic History    Marital status: Single     Spouse name: Not on file    Number of children: Not on file    Years of education: Not on file    Highest education level: Not on file   Occupational History    Not on file   Tobacco Use    Smoking status: Never Smoker    Smokeless tobacco: Never Used   Substance and Sexual Activity    Alcohol use: Yes    Drug use: No    Sexual activity: Not on file     Comment:    Other Topics Concern    Not on file   Social History Narrative    Not on file     Social Determinants of Health     Financial Resource Strain: Low Risk     Difficulty of Paying Living Expenses: Not very hard   Food Insecurity: No Food Insecurity    Worried About Running Out of Food in the Last Year: Never true    [place] of Food in the Last Year: Never true   Transportation Needs: No Transportation Needs    Lack of Transportation (Medical): No    Lack of Transportation (Non-Medical):  No   Physical Activity:     Days of Exercise per Week: Not on file    Minutes of Exercise per Session: Not on file   Stress:     Feeling of Stress : Not on file   Social Connections:     Frequency of Communication with Friends and Family: Not on file    Frequency of Social Gatherings with Friends and Family: Not on file    Attends Taoist Services: Not on file    Active Member of [address] [place] [address] or Organizations: Not on file    Attends Club or Organization Meetings: Not on file    Marital Status: Not on file   Intimate Partner Violence:     Fear of Current or Ex-Partner: Not on file    Emotionally Abused: Not on file    Physically Abused: Not on file    Sexually Abused: Not on file   Housing Stability:     Unable to Pay for Housing in the Last Year: Not on file    Number of [place] in the Last Year: Not on file    Unstable Housing in the Last Year: Not on file       OB History        3    Para        Term         AB        Living           SAB        IAB        Ectopic        Molar        Multiple        Live Births                    Past Surgical History:   Procedure Laterality Date    COLONOSCOPY  2016    HYSTERECTOMY      TUBAL LIGATION      UPPER GASTROINTESTINAL ENDOSCOPY      during colonoscopy       Health Maintenance   Topic Date Due    Annual Wellness Visit (AWV)  Never done    DTaP/Tdap/Td vaccine (1 - Tdap) Never done    Depression Monitoring  2023    Breast cancer screen  2023    Pneumococcal 65+ years Vaccine (3 - PPSV23 or PCV20) 2023    Lipids  2027    Colorectal Cancer Screen  2031    DEXA (modify frequency per FRAX score)  Completed    Flu vaccine  Completed    Shingles vaccine  Completed    COVID-19 Vaccine  Completed    Hepatitis C screen  Completed    Hepatitis A vaccine  Aged Out    Hepatitis B vaccine  Aged Out    Hib vaccine  Aged Out    Meningococcal (ACWY) vaccine  Aged Out         Current Outpatient Medications:     albuterol sulfate  (90 Base) MCG/ACT inhaler, Inhale 2 puffs into the lungs every 6 hours as needed for Wheezing, Disp: 18 g, Rfl: 1    atenolol (TENORMIN) 50 MG tablet, Take 1 tablet by mouth daily TAKE 1 TABLET EVERY DAY, Disp: 90 tablet, Rfl: 1    Cholecalciferol 50 MCG (2000 UT) CAPS, Take 2,000 Units by mouth daily, Disp: 90 capsule, Rfl: 1    fluticasone (FLONASE) 50 MCG/ACT nasal spray, 2 sprays by Nasal route daily, Disp: 16 g, Rfl: 2    levothyroxine (SYNTHROID) 100 MCG tablet, Take 1 tablet by mouth every morning (before breakfast), Disp: 90 tablet, Rfl: 0    levothyroxine (SYNTHROID) 88 MCG tablet, Take 1 tablet by mouth every morning (before breakfast), Disp: 90 tablet, Rfl: 0    loratadine (CLARITIN) 10 MG tablet, Take 1 tablet by mouth daily, Disp: 90 tablet, Rfl: 1    losartan (COZAAR) 50 mg tablet, Take 1 tablet by mouth daily, Disp: 90 tablet, Rfl: 1    montelukast (SINGULAIR) 10 MG tablet, Take 1 tablet by mouth daily, Disp: 90 tablet, Rfl: 1    albuterol (PROVENTIL) (2.5 MG/3ML) 0.083% nebulizer solution, Take 3 mLs by nebulization every 6 hours as needed for Wheezing, Disp: 50 each, Rfl: 1    ALPRAZolam (XANAX) 0.5 MG tablet, Take 0.5 mg by mouth daily as needed. , Disp: , Rfl:     venlafaxine (EFFEXOR XR) 150 MG extended release capsule, Take 300 mg by mouth daily, Disp: , Rfl:     Review of Systems   Constitutional: Negative. HENT: Negative. Eyes: Negative. Respiratory: Negative. Cardiovascular: Negative. Gastrointestinal: Negative. Endocrine: Negative. Genitourinary: Negative. Musculoskeletal: Negative. Skin: Negative. Allergic/Immunologic: Negative. Neurological: Negative. Hematological: Negative. Psychiatric/Behavioral: Negative. Vitals:    06/01/22 1406   BP: (!) 148/94   Pulse: 92   Temp: 99.6 °F (37.6 °C)   SpO2: 97%        Physical Exam  Constitutional:       Appearance: Normal appearance. HENT:      Head: Normocephalic. Nose: Nose normal.   Eyes:      Extraocular Movements: Extraocular movements intact. Pupils: Pupils are equal, round, and reactive to light. Cardiovascular:      Rate and Rhythm: Normal rate and regular rhythm. Pulmonary:      Effort: Pulmonary effort is normal.      Breath sounds: Normal breath sounds. Abdominal:      General: Abdomen is flat. Palpations: Abdomen is soft. Musculoskeletal:         General: Normal range of motion. Cervical back: Normal range of motion and neck supple. Skin:     General: Skin is warm and dry. Neurological:      General: No focal deficit present. Mental Status: She is alert and oriented to person, place, and time. Psychiatric:         Mood and Affect: Mood normal.         Behavior: Behavior normal.          PHQ9=11      Assessment & Plan:    1. Mixed hyperlipidemia  stable  - Comprehensive Metabolic Panel; Future  - Lipid Panel;  Future  - Lipid Panel  - Comprehensive Metabolic Panel    2. Moderate episode of recurrent major depressive disorder ([address]  Stable; continue [facility]  - [facility] [address]    3. Primary hypertension    - atenolol (TENORMIN) 50 MG tablet; Take 1 tablet by mouth daily TAKE 1 TABLET EVERY DAY  Dispense: 90 tablet; Refill: 1  - losartan (COZAAR) 50 mg tablet; Take 1 tablet by mouth daily  Dispense: 90 tablet; Refill: 1  - Comprehensive Metabolic Panel; Future  - CBC with Auto Differential; Future  - CBC with Auto Differential  - Comprehensive Metabolic Panel    4. Hypothyroidism, unspecified type  - levothyroxine (SYNTHROID) 100 MCG tablet; Take 1 tablet by mouth every morning (before breakfast)  Dispense: 90 tablet; Refill: 0  - levothyroxine (SYNTHROID) 88 MCG tablet; Take 1 tablet by mouth every morning (before breakfast)  Dispense: 90 tablet; Refill: 0  - TSH; Future  - TSH    5. Mild asthma without complication, unspecified whether persistent  - albuterol sulfate  (90 Base) MCG/ACT inhaler; Inhale 2 puffs into the lungs every 6 hours as needed for Wheezing  Dispense: 18 g; Refill: 1  - montelukast (SINGULAIR) 10 MG tablet; Take 1 tablet by mouth daily  Dispense: 90 tablet; Refill: 1  - albuterol (PROVENTIL) (2.5 MG/3ML) 0.083% nebulizer solution; Take 3 mLs by nebulization every 6 hours as needed for Wheezing  Dispense: 50 each; Refill: 1    6. Allergic rhinitis due to pollen, unspecified seasonality  - fluticasone (FLONASE) 50 MCG/ACT nasal spray; 2 sprays by Nasal route daily  Dispense: 16 g; Refill: 2  - loratadine (CLARITIN) 10 MG tablet; Take 1 tablet by mouth daily  Dispense: 90 tablet; Refill: 1  - montelukast (SINGULAIR) 10 MG tablet; Take 1 tablet by mouth daily  Dispense: 90 tablet; Refill: 1    7. Vitamin D deficiency  - Cholecalciferol 50 MCG (2000 UT) CAPS; Take 2,000 Units by mouth daily  Dispense: 90 capsule; Refill: 1  - Vitamin D 25 Hydroxy;  Future  - Vitamin D 25 Hydroxy    8. Skin cancer screening  - [address] [facility]      Greater than 50% counseling and/or coordination of care: the treatment regimen is extensive; detailed review. Will notify of labs. Recheck in 3 months. This note was dictated using dragon voice recognition software. It has been proofread, but there may still exist voice recognition errors that the author did not detect.       Signed By: MISTI Melchor - CNP     June 1, 2022

## 2022-07-29 RX ORDER — VENLAFAXINE HYDROCHLORIDE 150 MG/1
CAPSULE, EXTENDED RELEASE ORAL
Qty: 180 CAPSULE | Refills: 0 | Status: SHIPPED | OUTPATIENT
Start: 2022-07-29 | End: 2022-09-01 | Stop reason: SDUPTHER

## 2022-09-01 ENCOUNTER — OFFICE VISIT (OUTPATIENT)
Dept: FAMILY MEDICINE CLINIC | Facility: CLINIC | Age: 67
End: 2022-09-01
Payer: MEDICARE

## 2022-09-01 VITALS
OXYGEN SATURATION: 98 % | TEMPERATURE: 98.5 F | DIASTOLIC BLOOD PRESSURE: 79 MMHG | SYSTOLIC BLOOD PRESSURE: 137 MMHG | HEIGHT: 62 IN | HEART RATE: 88 BPM | WEIGHT: 263 LBS | BODY MASS INDEX: 48.4 KG/M2

## 2022-09-01 DIAGNOSIS — R40.0 SOMNOLENCE: ICD-10-CM

## 2022-09-01 DIAGNOSIS — J30.1 ALLERGIC RHINITIS DUE TO POLLEN, UNSPECIFIED SEASONALITY: ICD-10-CM

## 2022-09-01 DIAGNOSIS — R06.83 SNORING: ICD-10-CM

## 2022-09-01 DIAGNOSIS — I10 PRIMARY HYPERTENSION: Primary | ICD-10-CM

## 2022-09-01 DIAGNOSIS — E03.9 HYPOTHYROIDISM, UNSPECIFIED TYPE: ICD-10-CM

## 2022-09-01 DIAGNOSIS — R51.9 NONINTRACTABLE HEADACHE, UNSPECIFIED CHRONICITY PATTERN, UNSPECIFIED HEADACHE TYPE: ICD-10-CM

## 2022-09-01 DIAGNOSIS — E55.9 VITAMIN D DEFICIENCY: ICD-10-CM

## 2022-09-01 DIAGNOSIS — E66.01 CLASS 3 SEVERE OBESITY DUE TO EXCESS CALORIES WITHOUT SERIOUS COMORBIDITY WITH BODY MASS INDEX (BMI) OF 45.0 TO 49.9 IN ADULT (HCC): ICD-10-CM

## 2022-09-01 DIAGNOSIS — J45.909 MILD ASTHMA WITHOUT COMPLICATION, UNSPECIFIED WHETHER PERSISTENT: ICD-10-CM

## 2022-09-01 LAB
ALBUMIN SERPL-MCNC: 3.9 G/DL (ref 3.2–4.6)
ALBUMIN/GLOB SERPL: 1 {RATIO} (ref 1.2–3.5)
ALP SERPL-CCNC: 142 U/L (ref 50–136)
ALT SERPL-CCNC: 25 U/L (ref 12–65)
ANION GAP SERPL CALC-SCNC: 3 MMOL/L (ref 4–13)
AST SERPL-CCNC: 15 U/L (ref 15–37)
BASOPHILS # BLD: 0 K/UL (ref 0–0.2)
BASOPHILS NFR BLD: 0 % (ref 0–2)
BILIRUB SERPL-MCNC: 0.5 MG/DL (ref 0.2–1.1)
BUN SERPL-MCNC: 17 MG/DL (ref 8–23)
CALCIUM SERPL-MCNC: 9.1 MG/DL (ref 8.3–10.4)
CHLORIDE SERPL-SCNC: 107 MMOL/L (ref 101–110)
CO2 SERPL-SCNC: 29 MMOL/L (ref 21–32)
CREAT SERPL-MCNC: 0.8 MG/DL (ref 0.6–1)
DIFFERENTIAL METHOD BLD: ABNORMAL
EOSINOPHIL # BLD: 0 K/UL (ref 0–0.8)
EOSINOPHIL NFR BLD: 0 % (ref 0.5–7.8)
ERYTHROCYTE [DISTWIDTH] IN BLOOD BY AUTOMATED COUNT: 13.8 % (ref 11.9–14.6)
GLOBULIN SER CALC-MCNC: 3.9 G/DL (ref 2.3–3.5)
GLUCOSE SERPL-MCNC: 95 MG/DL (ref 65–100)
HCT VFR BLD AUTO: 41.5 % (ref 35.8–46.3)
HGB BLD-MCNC: 12.9 G/DL (ref 11.7–15.4)
IMM GRANULOCYTES # BLD AUTO: 0 K/UL (ref 0–0.5)
IMM GRANULOCYTES NFR BLD AUTO: 0 % (ref 0–5)
LYMPHOCYTES # BLD: 1.2 K/UL (ref 0.5–4.6)
LYMPHOCYTES NFR BLD: 24 % (ref 13–44)
MCH RBC QN AUTO: 29.5 PG (ref 26.1–32.9)
MCHC RBC AUTO-ENTMCNC: 31.1 G/DL (ref 31.4–35)
MCV RBC AUTO: 94.7 FL (ref 79.6–97.8)
MONOCYTES # BLD: 0.4 K/UL (ref 0.1–1.3)
MONOCYTES NFR BLD: 8 % (ref 4–12)
NEUTS SEG # BLD: 3.3 K/UL (ref 1.7–8.2)
NEUTS SEG NFR BLD: 68 % (ref 43–78)
NRBC # BLD: 0 K/UL (ref 0–0.2)
PLATELET # BLD AUTO: 256 K/UL (ref 150–450)
PMV BLD AUTO: 10.9 FL (ref 9.4–12.3)
POTASSIUM SERPL-SCNC: 4.7 MMOL/L (ref 3.5–5.1)
PROT SERPL-MCNC: 7.8 G/DL (ref 6.3–8.2)
RBC # BLD AUTO: 4.38 M/UL (ref 4.05–5.2)
SODIUM SERPL-SCNC: 139 MMOL/L (ref 136–145)
TSH, 3RD GENERATION: 1.5 UIU/ML (ref 0.36–3.74)
WBC # BLD AUTO: 4.8 K/UL (ref 4.3–11.1)

## 2022-09-01 PROCEDURE — 99214 OFFICE O/P EST MOD 30 MIN: CPT | Performed by: NURSE PRACTITIONER

## 2022-09-01 PROCEDURE — 1123F ACP DISCUSS/DSCN MKR DOCD: CPT | Performed by: NURSE PRACTITIONER

## 2022-09-01 RX ORDER — ALBUTEROL SULFATE 90 UG/1
2 AEROSOL, METERED RESPIRATORY (INHALATION) EVERY 6 HOURS PRN
Qty: 18 G | Refills: 1 | Status: SHIPPED | OUTPATIENT
Start: 2022-09-01

## 2022-09-01 RX ORDER — LOSARTAN POTASSIUM 50 MG/1
50 TABLET ORAL DAILY
Qty: 90 TABLET | Refills: 1 | Status: SHIPPED | OUTPATIENT
Start: 2022-09-01

## 2022-09-01 RX ORDER — FLUTICASONE PROPIONATE 50 MCG
2 SPRAY, SUSPENSION (ML) NASAL DAILY
Qty: 16 G | Refills: 2 | Status: SHIPPED | OUTPATIENT
Start: 2022-09-01

## 2022-09-01 RX ORDER — LORATADINE 10 MG/1
10 TABLET ORAL DAILY
Qty: 90 TABLET | Refills: 1 | Status: SHIPPED | OUTPATIENT
Start: 2022-09-01

## 2022-09-01 RX ORDER — ATENOLOL 50 MG/1
50 TABLET ORAL DAILY
Qty: 90 TABLET | Refills: 1 | Status: SHIPPED | OUTPATIENT
Start: 2022-09-01

## 2022-09-01 RX ORDER — ALPRAZOLAM 0.5 MG/1
0.5 TABLET ORAL DAILY PRN
Status: CANCELLED | OUTPATIENT
Start: 2022-09-01

## 2022-09-01 RX ORDER — ALBUTEROL SULFATE 2.5 MG/3ML
2.5 SOLUTION RESPIRATORY (INHALATION) EVERY 6 HOURS PRN
Qty: 50 EACH | Refills: 1 | Status: SHIPPED | OUTPATIENT
Start: 2022-09-01

## 2022-09-01 RX ORDER — LEVOTHYROXINE SODIUM 88 UG/1
88 TABLET ORAL
Qty: 90 TABLET | Refills: 1 | Status: SHIPPED | OUTPATIENT
Start: 2022-09-01

## 2022-09-01 RX ORDER — LEVOTHYROXINE SODIUM 0.1 MG/1
100 TABLET ORAL
Qty: 90 TABLET | Refills: 0 | Status: SHIPPED | OUTPATIENT
Start: 2022-09-01

## 2022-09-01 RX ORDER — MONTELUKAST SODIUM 10 MG/1
10 TABLET ORAL DAILY
Qty: 90 TABLET | Refills: 1 | Status: SHIPPED | OUTPATIENT
Start: 2022-09-01

## 2022-09-01 RX ORDER — VENLAFAXINE HYDROCHLORIDE 150 MG/1
150 CAPSULE, EXTENDED RELEASE ORAL DAILY
Qty: 180 CAPSULE | Refills: 0 | Status: SHIPPED | OUTPATIENT
Start: 2022-09-01

## 2022-09-01 ASSESSMENT — ENCOUNTER SYMPTOMS
EYES NEGATIVE: 1
ALLERGIC/IMMUNOLOGIC NEGATIVE: 1
GASTROINTESTINAL NEGATIVE: 1

## 2022-09-01 ASSESSMENT — PATIENT HEALTH QUESTIONNAIRE - PHQ9
SUM OF ALL RESPONSES TO PHQ9 QUESTIONS 1 & 2: 2
SUM OF ALL RESPONSES TO PHQ QUESTIONS 1-9: 2
2. FEELING DOWN, DEPRESSED OR HOPELESS: 1
SUM OF ALL RESPONSES TO PHQ QUESTIONS 1-9: 2
SUM OF ALL RESPONSES TO PHQ QUESTIONS 1-9: 2
1. LITTLE INTEREST OR PLEASURE IN DOING THINGS: 1
SUM OF ALL RESPONSES TO PHQ QUESTIONS 1-9: 2

## 2022-09-01 NOTE — PROGRESS NOTES
[address]  11 [facility], [address] [facility]   (ph) [phone] (fax) [phone]  Llois CHAPPELL, FNP-C      Chief Complaint   Patient presents with    Hypertension    Medication Refill       79 year-old white female comes into the office today to follow-up on chronic conditions. She is followed for hypertension,  GERD, anxiety, and depression. She never scheduled appt to see Stewart Costello but will do so now. She sees her daughter and grandchildren much more and is in great spirits today. Colonoscopy was in July 2021, repeat in 2026. She reports morning headaches have decreased. Still reports occasional fatigue. She reports a hx of mild sleep apnea but has gained some weight over the past several months. She still has not went for the sleep study but promises to schedule it if she can take her dog. If not, she will do a home study. She will call and schedule.             Allergies   Allergen Reactions    Latex Other (See Comments)     Burns; blisters    Cephalexin Hives    Lisinopril Other (See Comments)    Penicillins Hives    Sulfa Antibiotics Hives       Past Medical History:   Diagnosis Date    Adverse effect of anesthesia     hard to wake up    Allergic rhinitis     Asthma     Autoimmune disease ([address]     fibromyalgia    DDD (degenerative disc disease), lumbar     Depression     Diabetes ([address]     pre-diabetes    Diverticulitis 2016    diagnosed at Colonoscopy    Fibromyalgia     Fibromyalgia     Fracture 2016    right foot    GERD (gastroesophageal reflux disease)     Herpes genitalis in women     dormant    Hypertension     IBS (irritable bowel syndrome)     Morbid obesity ([address]     Primary hypothyroidism     PUD (peptic ulcer disease)     Sleep apnea     had 2 studies    Status post right knee replacement 7/25/2018       Family History   Problem Relation Age of Onset    Thyroid Disease Daughter         Hypothyroidism    Breast Cancer Neg Hx Negative. Musculoskeletal: Negative. Skin: Negative. Allergic/Immunologic: Negative. Neurological:  Positive for headaches. Somnolence     Hematological: Negative. Psychiatric/Behavioral: Negative. Vitals:    09/01/22 1303   BP: 137/79   Pulse: 88   Temp: 98.5 °F (36.9 °C)   SpO2: 98%        Physical Exam  Constitutional:       Appearance: Normal appearance. HENT:      Head: Normocephalic. Nose: Nose normal.   Eyes:      Extraocular Movements: Extraocular movements intact. Pupils: Pupils are equal, round, and reactive to light. Cardiovascular:      Rate and Rhythm: Normal rate and regular rhythm. Pulmonary:      Effort: Pulmonary effort is normal.      Breath sounds: Normal breath sounds. Abdominal:      General: Abdomen is flat. Palpations: Abdomen is soft. Musculoskeletal:         General: Normal range of motion. Cervical back: Normal range of motion and neck supple. Skin:     General: Skin is warm and dry. Neurological:      General: No focal deficit present. Mental Status: She is alert and oriented to person, place, and time. Psychiatric:         Mood and Affect: Mood normal.         Behavior: Behavior normal.      PHQ-9=2        Assessment & Plan:    1. Primary hypertension  Stable; Continue following meds:  - atenolol (TENORMIN) 50 MG tablet; Take 1 tablet by mouth daily  Dispense: 90 tablet; Refill: 1  - losartan (COZAAR) 50 MG tablet; Take 1 tablet by mouth daily  Dispense: 90 tablet; Refill: 1  - Comprehensive Metabolic Panel; Future  - CBC with Auto Differential; Future  - CBC with Auto Differential  - Comprehensive Metabolic Panel    2. Mild asthma without complication, unspecified whether persistent  Stable; continue meds  - albuterol (PROVENTIL) (2.5 MG/3ML) 0.083% nebulizer solution; Take 3 mLs by nebulization every 6 hours as needed for Wheezing  Dispense: 50 each;  Refill: 1  - albuterol sulfate HFA (PROVENTIL;VENTOLIN;PROAIR) 108 (90 Base) MCG/ACT inhaler; Inhale 2 puffs into the lungs every 6 hours as needed for Wheezing  Dispense: 18 g; Refill: 1  - montelukast (SINGULAIR) 10 MG tablet; Take 1 tablet by mouth daily  Dispense: 90 tablet; Refill: 1    3. Vitamin D deficiency  Stable; continue med  - Cholecalciferol 50 MCG (2000 UT) CAPS; Take 2,000 Units by mouth daily  Dispense: 90 capsule; Refill: 1  - Vitamin D 25 Hydroxy; Future  - Vitamin D 25 Hydroxy    4. Allergic rhinitis due to pollen, unspecified seasonality  Stable; continue following meds  - fluticasone (FLONASE) 50 MCG/ACT nasal spray; 2 sprays by Nasal route daily  Dispense: 16 g; Refill: 2  - loratadine (CLARITIN) 10 MG tablet; Take 1 tablet by mouth daily  Dispense: 90 tablet; Refill: 1  - montelukast (SINGULAIR) 10 MG tablet; Take 1 tablet by mouth daily  Dispense: 90 tablet; Refill: 1    5. Hypothyroidism, unspecified type  Stable; continue meds  - levothyroxine (SYNTHROID) 100 MCG tablet; Take 1 tablet by mouth every morning (before breakfast)  Dispense: 90 tablet; Refill: 0  - levothyroxine (SYNTHROID) 88 MCG tablet; Take 1 tablet by mouth every morning (before breakfast)  Dispense: 90 tablet; Refill: 1  - TSH; Future  - TSH    6. Nonintractable headache, unspecified chronicity pattern, unspecified headache type  - Eamon Garg MD, [facility]    7. Snoring  - Eamon Garg MD, [facility]    8. Somnolence  - Eamon Garg MD, Sleep Medicine, [place]    9. Class 3 severe obesity due to excess calories without serious comorbidity with body mass index (BMI) of 45.0 to 49.9 in adult (HCC)  Weight loss encouraged  - [facility]      Greater than 50% counseling and/or coordination of care: the treatment regimen is extensive; detailed review. Will notify of labs. Recheck in 4 months. This note was dictated using dragon voice recognition software.   It has been proofread, but there may still exist voice recognition errors that the author did not detect.       Signed By: MISTI Piña - CNP     September 1, 2022

## 2022-09-02 LAB — 25(OH)D3 SERPL-MCNC: 33.5 NG/ML (ref 30–100)

## 2022-10-11 NOTE — PROGRESS NOTES
[address] Narciso Erick. [address] [facility], [address] [facility]  [phone]    Patient Name:  Neelima Garg  YOB: 1955      Office Visit 10/12/2022    The patient is seen by synchronous (real-time) audio-video technology on [employer] Me.     Consent:  The patient/healthcare decision maker is aware that this patient-initiated Telehealth encounter is a billable service, with coverage as determined by his insurance carrier. The patient is aware that he/she may receive a bill and has provided verbal consent to proceed: Yes     I was at the office while conducting this visit. We discussed the expected course, resolution and complications of the diagnosis(es) in detail. Medication risks, benefits, costs, interactions, and alternatives were discussed as indicated. I advised him to contact the office if his condition worsens, changes or fails to improve as anticipated. He expressed understanding with the diagnosis(es) and plan. Pursuant to the emergency declaration under the [facility], [facility] waiver authority and the [employer] Act, this Virtual  Visit was conducted, with patient's consent, to reduce the patient's risk of exposure to COVID-19 and provide continuity of care for an established patient. Services were provided through a video synchronous discussion virtually to substitute for in-person clinic visit. CHIEF COMPLAINT:    Chief Complaint   Patient presents with    New Patient       HISTORY OF PRESENT ILLNESS:      This is a 29-year-old female seen in outpatient consultation virtually for evaluation of sleep apnea. The patient has a history of being overweight. She has gained considerable weight over the years. She reports she weighed 99 pounds when she got  and now weighs 263 pounds. She reports the development of worsening daytime fatigue and sleepiness.   She also reports poor quality sleep especially over the last year or two. She is waking up generally every 90 minutes or so. She then gets up and uses the restroom and reports that there is significant urine volume each time. As noted she has become increasingly fatigued and her Wyatt score is now 11/24. She does report some occasional leg movements in bed but does not give history that it is very disruptive to her sleep. She did have a CBC recently showing a minor decrease in one of her red blood cell indices suggesting possible iron deficiency. This can result in restless legs. After discussing her history, I believe that the patient is likely to have obstructive sleep apnea. Initially she only wanted to do a home sleep test so that she can take care of her dog but I indicated that the home sleep test can often result in a false negative if the patient does not sleep well. Her history of intermittent insomnia makes it quite likely that home sleep test would be inadequate to diagnose sleep apnea. After discussion, she is agreeable to proceed with an in lab study. I will go ahead and order a 2 mg dose of Lunesta for her in order to help facilitate longer sleep and hopefully a better sleep study. As noted the patient has gained considerable weight. Her BMI is now 48 consistent with morbid obesity. She admits that she has not been watching her calories and also leads a very sedentary life. I discussed with her the importance of weight loss in the management of her sleep apnea. The Wheat Belly diet handout is provided to help guide her food choices and she is encouraged to look at cookbooks associated with this diet to help promote weight loss long-term. She definitely needs to be more active and ambulate more which will also help her get her weight down.     The patient is hypothyroid and her last TSH level was normal suggesting that hypothyroidism is not contributing to her sleepiness or likely sleep apnea.       Sleep Medicine 10/12/2022   Sitting and reading 2   Watching TV 0   Sitting, inactive in a public place (e.g. a theatre or a meeting) 2   As a passenger in a car for an hour without a break 2   Lying down to rest in the afternoon when circumstances permit 2   Sitting and talking to someone 0   Sitting quietly after a lunch without alcohol 2   In a car, while stopped for a few minutes in traffic 1   [place] Sleepiness Score 11         Component Ref Range & Units 9/1/22 1350 6/1/22 1522   TSH, 3RD GENERATION 0.358 - 3.740 uIU/mL 1.500  1.830    Resulting Agency  [facility]       Component Ref Range & Units 9/1/22 1350 6/1/22 1522 3/1/22 1510 6/29/21 1348 3/25/21 1040 7/23/20 1446 1/23/20 1446   WBC 4.3 - 11.1 K/uL 4.8  5.0  4.9 R  4.8 R  3.5 R  5.0 R  4.6 R    RBC 4.05 - 5.2 M/uL 4.38  4.44  4.63 R  4.52 R  4.31 R  4.36 R  4.21 R    Hemoglobin 11.7 - 15.4 g/dL 12.9  12.9  13.6 R  13.4 R  12.7 R  12.7 R  12.6 R    Hematocrit 35.8 - 46.3 % 41.5  39.9  40.8 R  39.8 R  38.4 R  37.5 R  35.7 R    MCV 79.6 - 97.8 FL 94.7  89.9  88 R  88 R  89 R  86 R  85 R    MCH 26.1 - 32.9 PG 29.5  29.1  29.4 R  29.6 R  29.5 R  29.1 R  29.9 R    MCHC 31.4 - 35.0 g/dL 31.1 Low   32.3  33.3 R  33.7 R  33.1 R  33.9 R  35.3 R    RDW 11.9 - 14.6 % 13.8  13.7  12.8 R  13.3 R  13.8 R  13.2 R  13.2 R    Platelets 124 - 552 K/uL 256  269  269 R  250 R  233 R  251 R  250 R       Past Medical History:   Diagnosis Date    Adverse effect of anesthesia     hard to wake up    Allergic rhinitis     Asthma     Autoimmune disease ([address]     fibromyalgia    DDD (degenerative disc disease), lumbar     Depression     Diabetes ([address]     pre-diabetes    Diverticulitis 2016    diagnosed at Colonoscopy    Fibromyalgia     Fibromyalgia     Fracture 2016    right foot    GERD (gastroesophageal reflux disease)     Herpes genitalis in women     dormant    Hypertension     IBS (irritable bowel syndrome)     Morbid obesity ([address]     Primary hypothyroidism     PUD (peptic ulcer disease)     Sleep apnea     had 2 studies    Status post right knee replacement 7/25/2018         [unfilled]      Past Surgical History:   Procedure Laterality Date    COLONOSCOPY  09/2016    HYSTERECTOMY (CERVIX STATUS UNKNOWN)      TUBAL LIGATION      UPPER GASTROINTESTINAL ENDOSCOPY      during colonoscopy       [unfilled]    Social History     Socioeconomic History    Marital status: Single     Spouse name: Not on file    Number of children: Not on file    Years of education: Not on file    Highest education level: Not on file   Occupational History    Not on file   Tobacco Use    Smoking status: Never    Smokeless tobacco: Never   Substance and Sexual Activity    Alcohol use: Yes    Drug use: No    Sexual activity: Not on file     Comment:    Other Topics Concern    Not on file   Social History Narrative    Not on file     Social Determinants of Health     Financial Resource Strain: Low Risk     Difficulty of Paying Living Expenses: Not very hard   Food Insecurity: No Food Insecurity    Worried About Running Out of Food in the Last Year: Never true    Ran Out of Food in the Last Year: Never true   Transportation Needs: No Transportation Needs    Lack of Transportation (Medical): No    Lack of Transportation (Non-Medical):  No   Physical Activity: Not on file   Stress: Not on file   Social Connections: Not on file   Intimate Partner Violence: Not on file   Housing Stability: Not on file         Family History   Problem Relation Age of Onset    Thyroid Disease Daughter         Hypothyroidism    Breast Cancer Neg Hx     Diabetes Sister     Cancer Mother         Lung    GERD Father     Hypertension Father     COPD Father          Allergies   Allergen Reactions    Latex Other (See Comments)     Burns; blisters    Cephalexin Hives    Lisinopril Other (See Comments)    Penicillins Hives    Sulfa Antibiotics Hives         Current Outpatient Medications   Medication Sig eszopiclone (LUNESTA) 2 MG TABS Take on arrival to sleep center. albuterol (PROVENTIL) (2.5 MG/3ML) 0.083% nebulizer solution Take 3 mLs by nebulization every 6 hours as needed for Wheezing    albuterol sulfate HFA (PROVENTIL;VENTOLIN;PROAIR) 108 (90 Base) MCG/ACT inhaler Inhale 2 puffs into the lungs every 6 hours as needed for Wheezing    atenolol (TENORMIN) 50 MG tablet Take 1 tablet by mouth daily TAKE 1 TABLET EVERY DAY    Cholecalciferol 50 MCG (2000 UT) CAPS Take 2,000 Units by mouth daily    fluticasone (FLONASE) 50 MCG/ACT nasal spray 2 sprays by Nasal route daily    levothyroxine (SYNTHROID) 100 MCG tablet Take 1 tablet by mouth every morning (before breakfast)    levothyroxine (SYNTHROID) 88 MCG tablet Take 1 tablet by mouth every morning (before breakfast)    loratadine (CLARITIN) 10 MG tablet Take 1 tablet by mouth daily    losartan (COZAAR) 50 MG tablet Take 1 tablet by mouth daily    montelukast (SINGULAIR) 10 MG tablet Take 1 tablet by mouth daily    venlafaxine (EFFEXOR XR) 150 MG extended release capsule Take 1 capsule by mouth daily (Patient taking differently: Take 300 mg by mouth daily)    ALPRAZolam (XANAX) 0.5 MG tablet Take 0.5 mg by mouth daily as needed. No current facility-administered medications for this visit. REVIEW OF SYSTEMS:   CONSTITUTIONAL:   There is no history of fever, chills, night sweats, weight loss; she has had weight gain, persistent fatigue, and lethargy/hypersomnolence. EYES:   Denies problems with eye pain, erythema, blurred vision, or visual field loss. ENTM:   Denies history of tinnitus, epistaxis, sore throat, hoarseness, or dysphonia. LYMPH:   Denies swollen glands. CARDIAC:   No chest pain, pressure, discomfort, palpitations, orthopnea, murmurs, or edema. GI:   No dysphagia, heartburn reflux, nausea/vomiting, diarrhea, abdominal pain, or bleeding.    :   Denies history of dysuria, hematuria, polyuria, or decreased urine output. MS:   No history of myalgias, arthralgias, bone pain, or muscle cramps. SKIN:   No history of rashes, jaundice, cyanosis, nodules, or ulcers. ENDO:   Negative for heat or cold intolerance. No history of DM. PSYCH:   Negative for anxiety, depression, insomnia, hallucinations. NEURO:   There is no history of AMS, persistent headache, decreased level of consciousness, seizures, or motor or sensory deficits. PHYSICAL EXAM:    There were no vitals filed for this visit. GENERAL APPEARANCE:   The patient is obese and in no respiratory distress. HEENT:   PERRL. Conjunctivae unremarkable. Cranial nerves appear intact. NECK/LYMPHATIC:   Symmetrical with no elevation of jugular venous pulsation. Trachea midline. No thyroid enlargement. LUNGS:   Normal respiratory effort with symmetrical lung expansion. Breath sounds are without audible wheezing or congestion. She does have an intermittent dry cough. SKIN:   There are no rashes, cyanosis, jaundice, or ecchymosis present. EXTREMITIES:   The extremities are unremarkable without clubbing, cyanosis, joint inflammation, degenerative, or ischemic change. MUSCULOSKELETAL:   There is no abnormal tone, muscle atrophy, or abnormal movement present. NEURO:   The patient is alert and oriented to person, place, and time. Memory appears intact and mood is normal.  No gross sensorimotor deficits are present. ASSESSMENT:  (Medical Decision Making)      ICD-10-CM    1. Suspected sleep apnea  R29.818 The patient's history of sleepiness, major weight gain, and cyclical arousals about 90 minutes apart suggest at least REM related sleep apnea. I will go ahead and order an in lab study because she does have issues with insomnia and the home sleep test is likely to underestimate the severity of her disordered breathing potentially being a false negative. 2. Hypersomnia  G47.10 This is mild with an [place] score of 11/24.       3. Primary hypothyroidism  E03.9 Her most recent TSH level was normal.      4. Insomnia, unspecified type  G47.00 The patient's insomnia makes a home sleep test inadequate. She is agreeable to an in lab study. 5. Obesity, morbid, BMI 40.0-49.9 ([facility])  E66.01 The patient's BMI is over 48. Weight loss needs to be substantial and she needs to follow a low glycemic index diet. I have suggested the Wheat Belly diet and I provided her with a handout to help her guide her food choices. She is also encouraged to look at cookbooks associated with this diet to help with weight loss long-term. PLAN:    The patient will be scheduled for an in lab split-night polysomnography. The patient will be prescribed Lunesta 2 mg to be taken on arrival to the study to help her sleep better. Weight loss through caloric restriction particular of simple carbohydrates. Follow-up will be in about 4 months. If she has sleep apnea, she will be started on therapy shortly after the polysomnography is complete. If she shows significant leg movements during her polysomnography, evaluation for restless leg syndrome may be in order. Orders Placed This Encounter   Procedures    Ambulatory Referral to Sleep Studies     Referral Priority:   Routine     Referral Type:   Sleep Study     Referral Reason:   Specialty Services Required     Number of Visits Requested:   1        Orders Placed This Encounter   Medications    eszopiclone (LUNESTA) 2 MG TABS     Sig: Take on arrival to sleep center. Dispense:  1 tablet     Refill:  Monica Howard MD  Electronically signed    Over 50% of today's office visit was spent in face to face time reviewing test results, prognosis, importance of compliance, education about disease process, benefits of medications, instructions for management of acute flare-ups, and follow up plans. Total face to face time spent with the patient and charting was 35 minutes.       Dictated using voice recognition software.   Proof read but unrecognized errors may exist.

## 2022-10-12 ENCOUNTER — TELEMEDICINE (OUTPATIENT)
Dept: SLEEP MEDICINE | Age: 67
End: 2022-10-12
Payer: MEDICARE

## 2022-10-12 DIAGNOSIS — G47.00 INSOMNIA, UNSPECIFIED TYPE: ICD-10-CM

## 2022-10-12 DIAGNOSIS — G47.10 HYPERSOMNIA: ICD-10-CM

## 2022-10-12 DIAGNOSIS — E66.01 OBESITY, MORBID, BMI 40.0-49.9 (HCC): ICD-10-CM

## 2022-10-12 DIAGNOSIS — E03.9 PRIMARY HYPOTHYROIDISM: ICD-10-CM

## 2022-10-12 DIAGNOSIS — R29.818 SUSPECTED SLEEP APNEA: Primary | ICD-10-CM

## 2022-10-12 PROCEDURE — 1123F ACP DISCUSS/DSCN MKR DOCD: CPT | Performed by: INTERNAL MEDICINE

## 2022-10-12 PROCEDURE — 99204 OFFICE O/P NEW MOD 45 MIN: CPT | Performed by: INTERNAL MEDICINE

## 2022-10-12 RX ORDER — ESZOPICLONE 2 MG/1
TABLET, FILM COATED ORAL
Qty: 1 TABLET | Refills: 0 | Status: SHIPPED | OUTPATIENT
Start: 2022-10-12 | End: 2022-11-02

## 2022-10-12 ASSESSMENT — SLEEP AND FATIGUE QUESTIONNAIRES
HOW LIKELY ARE YOU TO NOD OFF OR FALL ASLEEP WHILE SITTING QUIETLY AFTER LUNCH WITHOUT ALCOHOL: 2
ESS TOTAL SCORE: 11
HOW LIKELY ARE YOU TO NOD OFF OR FALL ASLEEP IN A CAR, WHILE STOPPED FOR A FEW MINUTES IN TRAFFIC: 1
HOW LIKELY ARE YOU TO NOD OFF OR FALL ASLEEP WHILE SITTING INACTIVE IN A PUBLIC PLACE: 2
HOW LIKELY ARE YOU TO NOD OFF OR FALL ASLEEP WHEN YOU ARE A PASSENGER IN A CAR FOR AN HOUR WITHOUT A BREAK: 2
HOW LIKELY ARE YOU TO NOD OFF OR FALL ASLEEP WHILE SITTING AND TALKING TO SOMEONE: 0
HOW LIKELY ARE YOU TO NOD OFF OR FALL ASLEEP WHILE LYING DOWN TO REST IN THE AFTERNOON WHEN CIRCUMSTANCES PERMIT: 2
HOW LIKELY ARE YOU TO NOD OFF OR FALL ASLEEP WHILE WATCHING TV: 0
HOW LIKELY ARE YOU TO NOD OFF OR FALL ASLEEP WHILE SITTING AND READING: 2

## 2022-10-12 NOTE — PATIENT INSTRUCTIONS
WHAT IS THE WHEAT BELLY DIET? Wheat Belly: Lose the Wheat, Lose the Weight, and Find Your Path Back to Health is the book by the renowned cardiologist, Dr. Taylor Young, which explains how eliminating wheat from our diets can result in numerous health benefits, including weight loss. Wheat Belly is really about two things:     1) eliminating wheat (and other gluten-containing grains such as barley and rye), and   2) managing carbohydrates/sugars to individual tolerance levels to manage blood sugar and promote weight-loss       The theory is that wheat promotes high blood sugar which though a series of reactions, causes the body to accumulate more visceral fat. The Plan   Wheat isn't the only bad boy in this diet. Many other foods are either eliminated or eaten in limited quantities such as fruit, starchy veggies, whole grains, legumes, dried fruit, corn starch and corn meal. Three meals a day are encouraged without any snacks. So what can you eat? Heres the basic list:     In unlimited quantities:   Veggies (except potatoes and corn)   Raw nuts and seeds   Oils   Meats and eggs   Cheese   Non-sugary condiments (like mustard and salsa)   Ground flaxseed   Avocados, olives, coconut   Spices   Unsweetened cocoa        In limited quantities:   Dairy (except cheese)   Fruit   Whole corn (like on the cob)   Fruit juice   Non-wheat, non-gluten grains (like quinoa, amaranth, and rice)   Beans, peas, and lentils   Soy products (fermented soy products preferred)  Unlike other diet plans, there is basically one phase: toss out all your wheat foods and go cold turkey. Once you go wheat free, the author claims that the pounds will come right off and youll immediately feel better. Fasting, defined as drinking water only, is highly recommended for several days or even weeks at a time. How to Eat a Wheat Belly Diet in a Healthy Way  Best Wheat Belly Foods to Eat:   All varieties of fresh vegetables, especially those that are non-starchy and low in calories. These include things like cruciferous veggies (broccoli or [place]ell sprouts, for example), leafy greens, peppers, mushrooms, asparagus, artichoke, etc.   Fresh fruit (but not processed juices), including berries, apples, melon, and citrus fruits like grapefruit or oranges. Some people prefer to eat mostly low-sugar fruits but avoid those higher in sugar like pineapple, papaya, brent or banana. Healthy fats like coconut oil or olive oil, raw nuts and seeds, avocado, coconut milk, olives, cocoa butter, and grass-fed butter or ghee. Grass-fed, humanely raised meat and eggs, plus wild-caught fish. Full-fat cheeses (ideally made from raw, organic milk). Fermented foods like unsweetened kefir or yogurt, pickled or cultured vegetables, and in moderation tofu, tempeh, miso and natto. If theyre well-tolerated, unprocessed grains in moderation, including quinoa, millet, buckwheat (not actually a type of wheat), brown rice and amaranth. Worst Wheat Belly Foods to Avoid:    Eating a wheat belly diet means avoiding anything made with the grains wheat, barley, rye, spelt or certain oats. Additionally, Sigifredo Pena recommends avoiding added sugar, condiments that include synthetic or chemically altered ingredients, sugary drinks and other processed foods as much as possible. Below are the main foods to exclude from your diet if you choose to try following this dietary plan:  Grain-based desserts, including both packaged or homemade cakes, cookies, donuts, pies, crisps, cobblers, and granola bars   Breads, especially those made with refined wheat flour. Even many gluten-free breads or packaged products should not contribute many calories to your diet.  While products made from grains other than wheat (like corn or rice) might be free of gluten, theyre still usually not very nutrient-dense and are inferior to eating whole, sprouted ancient grains like oats, quinoa, wild rice or teff, for example. Plus, modern food-processing techniques usually contaminate these foods with gluten since theyre processed using the same equipment that wheat is. Most cereals   Pizza   Pasta and noodles   Chips and crackers   Wheat tortillas, wraps, burritos and tacos   Fast food   Take-out, including most [place] or [place] dishes, burgers and deli sandwiches   Breaded proteins like chicken cutlets, processed meats, hot dogs and frozen veggie burgers   Added sugar, including high fructose corn syrup, sucrose, dried fruit, juices and sugary beverage   Processed rice and potato products   Trans fats, fried foods and cured meats  Tips for Giving Up Wheat and Processed Grains:  When grocery shopping, check ingredients carefully and look for products made without wheat, rye and barley. This might mean choosing certified gluten-free items in some cases, although even these can be highly processed. The most substantial sources of wheat in your diet are likely bread or baked products made with wheat flour (like pizza, pasta at restaurants, bread, etc.), so unless specifically noted that these are grain- or gluten-free, assume they contain wheat. If you are going to buy bread, look for sourdough or sprouted grain breads (like Jeffy bread), which are usually better tolerated than ordinary wheat-flour breads. When it comes to baking or using flour in recipes, try some of these naturally gluten-free flour alternatives over wheat flour: brown rice, quinoa, chickpea, almond and coconut flour. Remember that wheat is hiding in many condiments, sauces, dressings, etc. Avoid any that contain flour or added sugar, sticking with basic condiments or flavor enhancers like vinegar, herbs, spices and real bone broth. Many types of alcohol, including beer, also contain wheat.  Hard liquor and wine are better options, however watch the amount you consume and what you mix these with.

## 2022-10-12 NOTE — LETTER
[address]  [address] [address]  Phone: [phone]  Fax: [phone]           Chris Gamboa MD      October 12, 2022     Patient: Rosalinda Bess   MR Number: [id number]   YOB: 1955   Date of Visit: 10/12/2022       Dear Dr. Mishra Spore: Thank you for referring Rosalinda Bess to me for evaluation/treatment. Below are the relevant portions of my assessment and plan of care. If you have questions, please do not hesitate to call me. I look forward to following Torrie Ceja along with you.     Sincerely,        Chris Gamboa MD    CC providers:  Johnny Rincon APRN - CNP  [address] [address] [address] [facility] [place]

## 2022-11-14 ENCOUNTER — HOSPITAL ENCOUNTER (OUTPATIENT)
Dept: SLEEP CENTER | Age: 67
Discharge: HOME | End: 2022-11-17
Payer: MEDICARE

## 2022-11-14 PROCEDURE — 95810 POLYSOM 6/> YRS 4/> PARAM: CPT

## 2022-12-14 NOTE — PROGRESS NOTES
[address] [place] [address] Court, [address] [facility]  [phone]    Patient Name:  George Isaacs  YOB: 1955      Office Visit 12/15/2022    The patient is seen by synchronous (real-time) audio-video technology on 12/15/2022. Consent:  The patient/healthcare decision maker is aware that this patient-initiated Telehealth encounter is a billable service, with coverage as determined by his insurance carrier. The patient is aware that he/she may receive a bill and has provided verbal consent to proceed:  Yes     I was at [facility] pulmonary's office while conducting this visit. We discussed the expected course, resolution and complications of the diagnosis(es) in detail. Medication risks, benefits, costs, interactions, and alternatives were discussed as indicated. I advised him to contact the office if his condition worsens, changes or fails to improve as anticipated. He expressed understanding with the diagnosis(es) and plan. Pursuant to the emergency declaration under the [address] [place] [address], [facility] waiver authority and the [employer] Act, this Virtual  Visit was conducted, with patient's consent, to reduce the patient's risk of exposure to COVID-19 and provide continuity of care for an established patient. Services were provided through a video synchronous discussion virtually to substitute for in-person clinic visit. CHIEF COMPLAINT:        Chief Complaint   Patient presents with    Follow-up    Sleep Study    Results         HISTORY OF PRESENT ILLNESS:      This is a 27-year-old female evaluated virtually for follow-up of sleep apnea. Since her last office visit she underwent polysomnography study done which revealed very poor sleep efficiency at 47.4% in spite of taking Lunesta as a sleep aid.   Her AHI was slightly elevated at 6.5/hour and there was no REM sleep during that study. The lowest oxygen saturation was 87% with a total of 1.3-minute below 89%. She has arousal index of 15.7/hour. I discussed with the patient the study findings and the importance of treating her sleep apnea in spite of it is mild. I think it will help her insomnia and daytime fatigue and sleepiness. I suggested to her that it is reasonable to try CPAP and if she does not tolerate it we can definitely consider oral appliance as second modality especially with a history of bruxism. She agreed to proceed with a CPAP trial.      She does report some occasional leg movements in bed but does not give history that it is very disruptive to her sleep. She did have a CBC recently showing a minor decrease in one of her red blood cell indices suggesting possible iron deficiency. This can result in restless legs. Her BMI is now 48 consistent with morbid obesity. The patient is hypothyroid and her last TSH level was normal suggesting that hypothyroidism is not contributing to her sleepiness or likely sleep apnea.     The [place] score today is 17 out of 24    Sleep Medicine 12/15/2022 10/12/2022   Sitting and reading 2 2   Watching TV 2 0   Sitting, inactive in a public place (e.g. a theatre or a meeting) 3 2   As a passenger in a car for an hour without a break 3 2   Lying down to rest in the afternoon when circumstances permit 3 2   Sitting and talking to someone 1 0   Sitting quietly after a lunch without alcohol 3 2   In a car, while stopped for a few minutes in traffic 0 1   [place] Sleepiness Score 17 11         Diagnostics:  Npsg 11/14/22        Past Medical History:   Diagnosis Date    Adverse effect of anesthesia     hard to wake up    Allergic rhinitis     Asthma     Autoimmune disease ([address]     fibromyalgia    DDD (degenerative disc disease), lumbar     Depression     Diabetes ([address]     pre-diabetes    Diverticulitis 2016    diagnosed at Colonoscopy    Fibromyalgia     Fibromyalgia Fracture 2016    right foot    GERD (gastroesophageal reflux disease)     Herpes genitalis in women     dormant    Hypertension     IBS (irritable bowel syndrome)     Morbid obesity (HCC)     Primary hypothyroidism     PUD (peptic ulcer disease)     Sleep apnea     had 2 studies    Status post right knee replacement 7/25/2018         Patient Active Problem List   Diagnosis    Gastroesophageal reflux disease without esophagitis    Blood glucose elevated    Depression, major, recurrent, mild ([facility])    Depression    Primary hypothyroidism    Vitamin D deficiency    Anemia    DDD (degenerative disc disease), lumbar    Status post right knee replacement    Genital herpes simplex    IBS (irritable bowel syndrome)    Hypertension    Anxiety    Asthma    Insomnia    Fibromyalgia    Seasonal allergic rhinitis    Chronic fatigue    Obesity, morbid, BMI 40.0-49.9 ([facility])    Hypersomnia    HAIR (obstructive sleep apnea)    Nocturnal hypoxemia          Past Surgical History:   Procedure Laterality Date    COLONOSCOPY  09/2016    HYSTERECTOMY (CERVIX STATUS UNKNOWN)      TUBAL LIGATION      UPPER GASTROINTESTINAL ENDOSCOPY      during colonoscopy       [unfilled]        Social History     Socioeconomic History    Marital status:      Spouse name: Not on file    Number of children: Not on file    Years of education: Not on file    Highest education level: Not on file   Occupational History    Not on file   Tobacco Use    Smoking status: Never    Smokeless tobacco: Never   Substance and Sexual Activity    Alcohol use: Yes    Drug use: No    Sexual activity: Not on file     Comment:    Other Topics Concern    Not on file   Social History Narrative    Not on file     Social Determinants of Health     Financial Resource Strain: Low Risk     Difficulty of Paying Living Expenses: Not very hard   Food Insecurity: No Food Insecurity    Worried About Running Out of Food in the Last Year: Never true    Ran Out of Food in the Last Year: Never true   Transportation Needs: No Transportation Needs    Lack of Transportation (Medical): No    Lack of Transportation (Non-Medical): No   Physical Activity: Not on file   Stress: Not on file   Social Connections: Not on file   Intimate Partner Violence: Not on file   Housing Stability: Not on file         Family History   Problem Relation Age of Onset    Thyroid Disease Daughter         Hypothyroidism    Breast Cancer Neg Hx     Diabetes Sister     Cancer Mother         Lung    GERD Father     Hypertension Father     COPD Father          Allergies   Allergen Reactions    Latex Other (See Comments)     Burns; blisters    Cephalexin Hives    Lisinopril Other (See Comments)    Penicillins Hives    Sulfa Antibiotics Hives         Current Outpatient Medications   Medication Sig    albuterol (PROVENTIL) (2.5 MG/3ML) 0.083% nebulizer solution Take 3 mLs by nebulization every 6 hours as needed for Wheezing    albuterol sulfate HFA (PROVENTIL;VENTOLIN;PROAIR) 108 (90 Base) MCG/ACT inhaler Inhale 2 puffs into the lungs every 6 hours as needed for Wheezing    atenolol (TENORMIN) 50 MG tablet Take 1 tablet by mouth daily TAKE 1 TABLET EVERY DAY    Cholecalciferol 50 MCG (2000 UT) CAPS Take 2,000 Units by mouth daily    fluticasone (FLONASE) 50 MCG/ACT nasal spray 2 sprays by Nasal route daily    levothyroxine (SYNTHROID) 100 MCG tablet Take 1 tablet by mouth every morning (before breakfast)    levothyroxine (SYNTHROID) 88 MCG tablet Take 1 tablet by mouth every morning (before breakfast)    loratadine (CLARITIN) 10 MG tablet Take 1 tablet by mouth daily    losartan (COZAAR) 50 MG tablet Take 1 tablet by mouth daily    montelukast (SINGULAIR) 10 MG tablet Take 1 tablet by mouth daily    venlafaxine (EFFEXOR XR) 150 MG extended release capsule Take 1 capsule by mouth daily (Patient taking differently: Take 300 mg by mouth daily)    ALPRAZolam (XANAX) 0.5 MG tablet Take 0.5 mg by mouth daily as needed.      No current facility-administered medications for this visit. REVIEW OF SYSTEMS:   CONSTITUTIONAL:   There is no history of fever, chills, night sweats, weight loss, weight gain, persistent fatigue, or lethargy/hypersomnolence. CARDIAC:   No chest pain, pressure, discomfort, palpitations, orthopnea, murmurs, or edema. GI:   No dysphagia, heartburn reflux, nausea/vomiting, diarrhea, abdominal pain, or bleeding. NEURO:   There is no history of AMS, persistent headache, decreased level of consciousness, seizures, or motor or sensory deficits. PHYSICAL EXAM:    There were no vitals filed for this visit. PHYSICAL EXAMINATION:  [ INSTRUCTIONS:  \"[x]\" Indicates a positive item  \"[]\" Indicates a negative item   Vital Signs: (As obtained by patient/caregiver at home)  There were no vitals taken for this visit.      Constitutional: [x] Appears well-developed and well-nourished [x] No apparent distress      [] Abnormal     Mental status: [x] Alert and awake  [x] Oriented to person/place/time [x] Able to follow commands    [] Abnormal -     Eyes:   EOM    [x]  Normal    [] Abnormal -   Sclera  [x]  Normal    [] Abnormal -          Discharge [x]  None visible   [] Abnormal -    HENT: [x] Normocephalic, atraumatic  [] Abnormal -  [x] Mouth/Throat: Mucous membranes are moist    External Ears [x] Normal  [] Abnormal -    Neck: [x] No visualized mass [] Abnormal -     Pulmonary/Chest: [x] Respiratory effort normal   [x] No visualized signs of difficulty breathing or respiratory distress        [] Abnormal -      Musculoskeletal:   [x] Normal gait with no signs of ataxia         [x] Normal range of motion of neck        [] Abnormal -     Neurological:        [x] No Facial Asymmetry (Cranial nerve 7 motor function) (limited exam due to video visit)          [x] No gaze palsy        [] Abnormal -         Skin:        [x] No significant exanthematous lesions or discoloration noted on facial skin         [] Abnormal -            Psychiatric:       [x] Normal Affect [] Abnormal -       [x] No Hallucinations    Other pertinent observable physical exam findings:-            ASSESSMENT:  (Medical Decision Making)      Diagnosis Orders   1. HAIR (obstructive sleep apnea) mild and associated with daytime sleepiness and need further treatment. We will consider trial of auto CPAP with a pressure of 6-15 cm with heated humidity. Need mask fitting done and CPAP set up. If she does not tolerate CPAP long-term we will consider oral appliance as an alternative DME - DURABLE MEDICAL EQUIPMENT      2. Hypersomnia, related to untreated sleep apnea DME - DURABLE MEDICAL EQUIPMENT      3. Nocturnal hypoxemia, mild and should improve with the CPAP treatment DME - DURABLE MEDICAL EQUIPMENT           PLAN:    The patient be started on auto CPAP at 6-15 cm with EPR 3 and humidity    She will maintain positional therapy and follow sleep hygiene    She will be scheduled for an appointment for mask fitting and CPAP set up    Maintain follow-up with her primary care provider    Return to sleep center in 4 months or sooner if needed    All questions and concerns were addressed properly and discussed with her in detail as noted above      Orders Placed This Encounter   Procedures    DME - [address] [place] [address] PULMONARY AND CRITICAL CARE  Phone: [phone] [address] [address]  Dept: [phone]      Patient Name: Neelima Garg  : 1955  Gender: female  Address: [place] [address]  Patient phone number: [phone] (home)       Primary Insurance: Payor: Rodriguez Gay / Plan: Rodriguez Gay / Product Type: *No Product type* /   Subscriber ID: [id number] - (Medicare Managed)      AMB Supply Order  Order Details     DME Location:   Order Date: 12/15/2022   The primary encounter diagnosis was HAIR (obstructive sleep apnea).  Diagnoses of Hypersomnia and Nocturnal hypoxemia were also pertinent to this visit. (  X   )New Set-Up       machine   (     ) CPAP Unit  (    x ) Auto CPAP Unit  (     ) BiLevel Unit  (     ) Auto BiLevel Unit  (     ) ASV         Length of need: 12 months    Pressure: 6-15  cmH20  EPR: 3  Ramp Time:  20 min    Starting Ramp Pressure:  5  cm H20    Patient had a diagnostic Apnea Hypopnea Index (AHI) of :  6.5/hr    *SUPPLIES* Replace all as needed, or per coverage guidelines     Masks Type:      (   ) -Full Face Mask (1 per 3 mon)  (    ) -Full Mask (1 per month) Interface/Cushion      (    ) -Nasal Mask (1 per 3 mon)  (    ) - Nasal Mask (1 per month) Interface/Cushion  (    ) -Pillow (2 per mon)  (    ) -Kmssiiqhj (1 per 6 mon)      _________________________________________________________________          Other Supplies:    (  X   )-Thqasmre (1 per 6 mon)  ( X    )-Xuwuvz Tubing (1 per 3 mon)  (  X   )- Disposable Filter (2 per mon)  (   X  )-Xkxvwi Humidifier (1 per year)     (     )-Uzhpoexjm (sometimes used with Full Face Mask) (1 per 6 mos)  (     )-Tubing-without heat (1 per 3 mos)  (     )-Non-Disposable Filter (1 per 6 mos)  (     )-Water Chamber (1 per 6 mos)  (     )-Humidifier non-heated (1 per 5 yrs)      Signed Date: 12/15/2022  Electronically Signed By: Dustin Montiel MD        No orders of the defined types were placed in this encounter. Over 50% of today's office visit was spent in face to face time reviewing test results, prognosis, importance of compliance, education about disease process, benefits of medications, instructions for management of acute flare-ups, and follow up plans. Total time spent with patient and charting was 30 minutes.         Dustin Montiel MD  Electronically signed

## 2022-12-15 ENCOUNTER — TELEMEDICINE (OUTPATIENT)
Dept: SLEEP MEDICINE | Age: 67
End: 2022-12-15
Payer: MEDICARE

## 2022-12-15 DIAGNOSIS — G47.33 OSA (OBSTRUCTIVE SLEEP APNEA): Primary | ICD-10-CM

## 2022-12-15 DIAGNOSIS — G47.34 NOCTURNAL HYPOXEMIA: ICD-10-CM

## 2022-12-15 DIAGNOSIS — G47.10 HYPERSOMNIA: ICD-10-CM

## 2022-12-15 PROCEDURE — 99214 OFFICE O/P EST MOD 30 MIN: CPT | Performed by: INTERNAL MEDICINE

## 2022-12-15 PROCEDURE — 1123F ACP DISCUSS/DSCN MKR DOCD: CPT | Performed by: INTERNAL MEDICINE

## 2022-12-15 ASSESSMENT — SLEEP AND FATIGUE QUESTIONNAIRES
ESS TOTAL SCORE: 17
HOW LIKELY ARE YOU TO NOD OFF OR FALL ASLEEP WHILE SITTING AND READING: 2
HOW LIKELY ARE YOU TO NOD OFF OR FALL ASLEEP WHILE WATCHING TV: 2
HOW LIKELY ARE YOU TO NOD OFF OR FALL ASLEEP WHILE SITTING QUIETLY AFTER LUNCH WITHOUT ALCOHOL: 3
HOW LIKELY ARE YOU TO NOD OFF OR FALL ASLEEP WHILE SITTING INACTIVE IN A PUBLIC PLACE: 3
HOW LIKELY ARE YOU TO NOD OFF OR FALL ASLEEP WHILE LYING DOWN TO REST IN THE AFTERNOON WHEN CIRCUMSTANCES PERMIT: 3
HOW LIKELY ARE YOU TO NOD OFF OR FALL ASLEEP WHILE SITTING AND TALKING TO SOMEONE: 1
HOW LIKELY ARE YOU TO NOD OFF OR FALL ASLEEP WHEN YOU ARE A PASSENGER IN A CAR FOR AN HOUR WITHOUT A BREAK: 3
HOW LIKELY ARE YOU TO NOD OFF OR FALL ASLEEP IN A CAR, WHILE STOPPED FOR A FEW MINUTES IN TRAFFIC: 0

## 2022-12-15 NOTE — PATIENT INSTRUCTIONS
The company who will be taking care of your Sleep Hygiene Instructions    Sleep only as much as you need to feel refreshed during the following day. Restricting your time in bed helps to consolidate and deepen your sleep. Excessively long times in bed lead to fragmented and shallow sleep. Get up at your regular time the next day, no matter how little your slept. Get up at the same time each day, 7 days a week. A regular wake time in the morning leads to regular times on sleep onset, and helps to set your biological clock. Exercise regularly. Schedule exercise times so that they do not occur within 3 hours of when you intend to go to bed. Exercise makes it easier to initiate sleep and deepen sleep. Don't take your problems to bed. Plan some time earlier in the evening for working on your problems or planning the next day's activities. Worrying may interfere with initiating sleep and produce shallow sleep. Train yourself to use the bedroom only for sleep and sexual activity. This will help condition your brain to see bed as the place for sleeping. Do not read, watch TV or eat in bed. Do not try and fall asleep. This only makes the problem worse. Instead, turn on the light, leave the bedroom, and do something different like reading a book. Don't engage in stimulating activity. Return to bed only when you feel sleepy. Avoid long naps. Staying awake during the day helps to fall asleep at night. Naps totalling more than 30 minutes increase your chances of having trouble sleeping at night. Make sure that your bedroom is comfortable and free from light and noise. A comfortable, noise-free sleep environment will reduce the likelihood that you will wake up during the night. Noise that does not awaken you may disturb the quality of your sleep. Carpeting, insulated curtains, and closing the door may help. Make sure that your bedroom is at a comfortable temperature during the night.  Excessively warm or cold sleep environments may disturb sleep. Eat regular meals and di not go to bed hungry. Hunger may disturb sleep. A light snack at bedtime (especially carbohydrates) may help sleep, but avoid greasy or heavy foods. Avoid excessive liquids in the evening. Reducing liquid intake will minimize the need for night-time trips to the bathroom. Cut down on all caffeine products. Caffeinated beverages and food (Coffee, tea, cola, chocolate) can cause difficulty falling asleep, awakenings during the night, and shallow sleep. Even caffeine early in the day can disrupt night-time sleep. Avoid alcohol, especially in the evening. Although alcohol helps tense people fall asleep more easily, it causes awakenings later in the night. Smoking may disturb sleep. Nicotine is a stimulant. Try not to smoke during the night when you have trouble sleeping. Learning about Sleeping Well    What does sleeping well mean? Sleeping well means getting enough sleep. How much sleep is enough varies among people. The number of hours you sleep is not as improtant as how you feel when you wake up. If you do not feel refreshed, you probably need more sleep. Another sign of not getting enough sleep is feeling tired during the day. The average totally nightly sleep time is 7 1/2 to 8 hours. Healthy adults may need a little more or a little less than this. Why is getting enough sleep important? Getting enough quality sleep is a basic part of good health. When your sleep suffers, your mood and your thoughts can suffer too. Your thoughts can suffer too. You ma find yourself feeling more grumpy or stressed. No getting enough sleep also can lead to serious problems, including injury, accidents, anxiety, and depression. What might cause poor sleeping? Many things can cause sleep problems, including:    Stress. Stress can be caused by fear about a single event, such as giving a speech.   Or you may have ongoing stress, such as worry about work or school. Depression, anxiety, and other mental or emotional conditions. Changes in your sleep habits or surroundings. This includes changes that happen where you sleep, such as noise, light, or sleeping in a different bed. It also includes changes in your sleep pattern, such as having jet lab or working a late shift. Health problems, such as pain, breathing problems, and restless legs syndrome. Lack of regular exercise. How can you help yourself? Here are some tips that may help you sleep more soundly and wake up feeling more refreshed. Your sleeping area    Use your bedroom only for sleeping and sex. A bit of light reading may help you fall asleep. But if it doesn't, do your reading elsewhere in the house. Don't watch TV in bed. Be sure your bed is big enough to stretch out comfortable, especially if you have a sleep partner. Keep your bedroom quiet, dark, and cool. Use curtains, blinds, or sleep mask to block out light. To block out noise, use earplugs, soothing music, or a \"white noise\" machine. Your evening and bedtime routine    Create a relaxing bedtime routine. You might want to take a warm shower or bath, listen to soothing music, or drink a cup of non-caffeinated tea. Go to bed at the same time every night. And get up at the same time every morning, even if you feel tired. What to avoid:    Limit caffeine (coffee, tea, caffeinated sodas) during the day, and dont have any for at least 4 to 6 hours before bedtime. Don't drink alcohol before bedtime. Alcohol can cause you to wake up more often during the night. Don't smoke or use tobacco, especially in the evening. Nicotine can keep you awake. Don't like in bed away for too long.   If you can't fall asleep, or if you wake up in the middle of the night and can't get back to sleep within 15 minutes or so, get out of bed and go to another room until you feel sleepy. Don't take medicine right before bed that may keep you awake or make you feel hyper or enegerized. Your doctor can tell you if your medicine may do this and if you can take it earlier in the day. If you can't sleep:    Imagine yourself in a peaceful, pleasant scene. Focus on the details and feelings of being in a place that is relaxing. Get up and do a quiet or boring activity until you feel sleepy. Don't drink liquids after 6 p.m. if you wake up often because you have to go to the bathroom. Where can you learn more? Go to http://www.gray.com/    Enter P620 in the search box to learn more about \"Learning About Sleeping Well. \"           Learning About Sleeping Well  What does sleeping well mean? Sleeping well means getting enough sleep. How much sleep is enough varies among people. The number of hours you sleep is not as important as how you feel when you wake up. If you do not feel refreshed, you probably need more sleep. Another sign of not getting enough sleep is feeling tired during the day. The average total nightly sleep time is 7½ to 8 hours. Healthy adults may need a little more or a little less than this. Why is getting enough sleep important? Getting enough quality sleep is a basic part of good health. When your sleep suffers, your mood and your thoughts can suffer too. You may find yourself feeling more grumpy or stressed. Not getting enough sleep also can lead to serious problems, including injury, accidents, anxiety, and depression. What might cause poor sleeping? Many things can cause sleep problems, including:  Stress. Stress can be caused by fear about a single event, such as giving a speech. Or you may have ongoing stress, such as worry about work or school. Depression, anxiety, and other mental or emotional conditions. Changes in your sleep habits or surroundings.  This includes changes that happen where you sleep, such as noise, light, or sleeping in a different bed. It also includes changes in your sleep pattern, such as having jet lag or working a late shift. Health problems, such as pain, breathing problems, and restless legs syndrome. Lack of regular exercise. How can you help yourself? Here are some tips that may help you sleep more soundly and wake up feeling more refreshed. Your sleeping area   Use your bedroom only for sleeping and sex. A bit of light reading may help you fall asleep. But if it doesn't, do your reading elsewhere in the house. Don't watch TV in bed. Be sure your bed is big enough to stretch out comfortably, especially if you have a sleep partner. Keep your bedroom quiet, dark, and cool. Use curtains, blinds, or a sleep mask to block out light. To block out noise, use earplugs, soothing music, or a \"white noise\" machine. Your evening and bedtime routine   Get regular exercise, but not within 3 to 4 hours before your bedtime. Create a relaxing bedtime routine. You might want to take a warm shower or bath, listen to soothing music, or drink a cup of noncaffeinated tea. Go to bed at the same time every night. And get up at the same time every morning, even if you feel tired. What to avoid   Limit caffeine (coffee, tea, caffeinated sodas) during the day, and don't have any for at least 4 to 6 hours before bedtime. Don't drink alcohol before bedtime. Alcohol can cause you to wake up more often during the night. Don't smoke or use tobacco, especially in the evening. Nicotine can keep you awake. Don't take naps during the day, especially close to bedtime. Don't lie in bed awake for too long. If you can't fall asleep, or if you wake up in the middle of the night and can't get back to sleep within 15 minutes or so, get out of bed and go to another room until you feel sleepy. Don't take medicine right before bed that may keep you awake or make you feel hyper or energized.  Your doctor can tell you if your medicine may do this and if you can take it earlier in the day. If you can't sleep   Imagine yourself in a peaceful, pleasant scene. Focus on the details and feelings of being in a place that is relaxing. Get up and do a quiet or boring activity until you feel sleepy. Don't drink any liquids after 6 p.m. if you wake up often because you have to go to the bathroom. Where can you learn more? Go to [employer]  Enter N368 in the search box to learn more about \"Learning About Sleeping Well. \"   © 0633-3514 Healthwise, [employer]. Care instructions adapted under license by [employer] (which disclaims liability or warranty for this information). This care instruction is for use with your licensed healthcare professional. If you have questions about a medical condition or this instruction, always ask your healthcare professional. [place] [address] any warranty or liability for your use of this information. Content Version: 23.3.294700; Current as of: March 12, 2014              Sleep Apnea: After Your Visit  Your Care Instructions  Sleep apnea means that you frequently stop breathing for 10 seconds or longer during sleep. It can be mild to severe, based on the number of times an hour that you stop breathing or have slowed breathing. Blocked or narrowed airways in your nose, mouth, or throat can cause sleep apnea. Your airway can become blocked when your throat muscles and tongue relax during sleep. You can treat sleep apnea at home by making lifestyle changes. You also can use a CPAP breathing machine that keeps tissues in the throat from blocking your airway. Or your doctor may suggest that you use a breathing device while you sleep. It helps keep your airway open. This could be a device that you put in your mouth. Other examples include strips or disks that you use on your nose. In some cases, surgery may be needed to remove enlarged tissues in the throat.   Follow-up care is a key part of your treatment and safety. Be sure to make and go to all appointments, and call your doctor if you are having problems. It's also a good idea to know your test results and keep a list of the medicines you take. How can you care for yourself at home? Lose weight, if needed. It may reduce the number of times you stop breathing or have slowed breathing. Sleep on your side. It may stop mild apnea. If you tend to roll onto your back, sew a pocket in the back of your pajama top. Put a tennis ball into the pocket, and stitch the pocket shut. This will help keep you from sleeping on your back. Avoid alcohol and medicines such as sleeping pills and sedatives before bed. Do not smoke. Smoking can make sleep apnea worse. If you need help quitting, talk to your doctor about stop-smoking programs and medicines. These can increase your chances of quitting for good. Prop up the head of your bed 4 to 6 inches by putting bricks under the legs of the bed. Treat breathing problems, such as a stuffy nose, caused by a cold or allergies. Try a continuous positive airway pressure (CPAP) breathing machine if your doctor recommends it. The machine keeps your airway open when you sleep. If CPAP does not work for you, ask your doctor if you can try other breathing machines. A bilevel positive airway pressure machine uses one type of air pressure for breathing in and another type for breathing out. Another device raises or lowers air pressure as needed while you breathe. Talk to your doctor if:  Your nose feels dry or bleeds when you use one of these machines. You may need to increase moisture in the air. A humidifier may help. Your nose is runny or stuffy from using a breathing machine. Decongestants or a corticosteroid nasal spray may help. You are sleepy during the day and it gets in the way of the normal things you do. Do not drive when you are drowsy. When should you call for help?   Watch closely for changes in your health, and be sure to contact your doctor if:  You still have sleep apnea even though you have made lifestyle changes. You are thinking of trying a device such as CPAP. You are having problems using a CPAP or similar machine. Where can you learn more? Go to [employer].be  Enter J936 in the search box to learn more about \"Sleep Apnea: After Your Visit. \"   © 8171-4998 Healthwise, [employer]. Care instructions adapted under license by [employer] (which disclaims liability or warranty for this information). This care instruction is for use with your licensed healthcare professional. If you have questions about a medical condition or this instruction, always ask your healthcare professional. [address] any warranty or liability for your use of this information. Content Version: 12.5.234584; Current as of: January 14, 2014                        Eating Healthy Foods: After Your Visit  Your Care Instructions  Eating healthy foods can help lower your risk for disease. Healthy food gives you energy and keeps your heart strong, your brain active, your muscles working, and your bones strong. A healthy diet includes a variety of foods from the basic food groups: grains, vegetables, fruits, milk and milk products, and meat and beans. Some people may eat more of their favorite foods from only one food group and, as a result, miss getting the nutrients they need. So, it is important to pay attention not only to what you eat but also to what you are missing from your diet. You can eat a healthy, balanced diet by making a few small changes. Follow-up care is a key part of your treatment and safety. Be sure to make and go to all appointments, and call your doctor if you are having problems. Its also a good idea to know your test results and keep a list of the medicines you take. How can you care for yourself at home?   Look at what you eat  Keep a food diary for a week or two and record everything you eat or drink. Track the number of servings you eat from each food group. For a balanced diet every day, eat a variety of:  6 or more ounce-equivalents of grains, such as cereals, breads, crackers, rice, or pasta, every day. An ounce-equivalent is 1 slice of bread, 1 cup of ready-to-eat cereal, or ½ cup of cooked rice, cooked pasta, or cooked cereal.  2½ cups of vegetables, especially:  Dark-green vegetables such as broccoli and spinach. Orange vegetables such as carrots and sweet potatoes. Dry beans (such as moran and kidney beans) and peas (such as lentils). 2 cups of fresh, frozen, or canned fruit. A small apple or 1 banana or orange equals 1 cup.  3 cups of nonfat or low-fat milk, yogurt, or other milk products. 5½ ounces of meat and beans, such as chicken, fish, lean meat, beans, nuts, and seeds. One egg, 1 tablespoon of peanut butter, ½ ounce nuts or seeds, or ¼ cup of cooked beans equals 1 ounce of meat. Learn how to read food labels for serving sizes and ingredients. Fast-food and convenience-food meals often contain few or no fruits or vegetables. Make sure you eat some fruits and vegetables to make the meal more nutritious. Look at your food diary. For each food group, add up what you have eaten and then divide the total by the number of days. This will give you an idea of how much you are eating from each food group. See if you can find some ways to change your diet to make it more healthy. Start small  Do not try to make dramatic changes to your diet all at once. You might feel that you are missing out on your favorite foods and then be more likely to fail. Start slowly, and gradually change your habits. Try some of the following:  Use whole wheat bread instead of white bread. Use nonfat or low-fat milk instead of whole milk. Eat brown rice instead of white rice, and eat whole wheat pasta instead of white-flour pasta. Try low-fat cheeses and low-fat yogurt.   Add more fruits and vegetables to meals and have them for snacks. Add lettuce, tomato, cucumber, and onion to sandwiches. Add fruit to yogurt and cereal.  Enjoy food  You can still eat your favorite foods. You just may need to eat less of them. If your favorite foods are high in fat, salt, and sugar, limit how often you eat them, but do not cut them out entirely. Eat a wide variety of foods. Make healthy choices when eating out  The type of restaurant you choose can help you make healthy choices. Even fast-food chains are now offering more low-fat or healthier choices on the menu. Choose smaller portions, or take half of your meal home. When eating out, try:  A veggie pizza with a whole wheat crust or grilled chicken (instead of sausage or pepperoni). Pasta with roasted vegetables, grilled chicken, or marinara sauce instead of cream sauce. A vegetable wrap or grilled chicken wrap. Broiled or poached food instead of fried or breaded items. Make healthy choices easy  Buy packaged, prewashed, ready-to-eat fresh vegetables and fruits, such as baby carrots, salad mixes, and chopped or shredded broccoli and cauliflower. Buy packaged, presliced fruits, such as melon or pineapple. Choose 100% fruit or vegetable juice instead of soda. Limit juice intake to 4 to 6 oz (½ to ¾ cup) a day. Blend low-fat yogurt, fruit juice, and canned or frozen fruit to make a smoothie for breakfast or a snack. Where can you learn more? Go to [employer].be  Enter T756 in the search box to learn more about \"Eating Healthy Foods: After Your Visit. \"   © 0158-3274 Healthwise, Incorporated. Care instructions adapted under license by [facility] (which disclaims liability or warranty for this information).  This care instruction is for use with your licensed healthcare professional. If you have questions about a medical condition or this instruction, always ask your healthcare professional. [employer] disclaims any warranty or liability for your use of this information. Content Version: 10.0.113954; Current as of: May 17, 2013                                    Learning About Physical Activity  What is physical activity? Physical activity is any kind of activity that gets your body moving. Being active means allowing your body to \"practice\" breathing, stretching, and lifting. The more practice your body gets, the better it works. The types of physical activity that can help you get fit and stay healthy include:  Aerobic or \"cardio\" activities that make your heart beat faster and make you breathe harder, such as brisk walking, riding a bike, or running. Aerobic activities strengthen your heart and lungs and build up your endurance. Strength training activities that make your muscles work against, or \"resist,\" something, such as lifting weights or doing push-ups. These activities help tone and strengthen your muscles. Stretches that allow you to move your joints and muscles through their full range of motion. Stretching helps you be more flexible and avoid injury. What are the benefits of physical activity? Being active is one of the best things you can do to get fit and stay healthy. It helps you to:  Feel stronger and have more energy to do all the things you like to do. Focus better at school or work and perform better in sports. Feel, think, and sleep better. Reach and stay at a healthy weight. Lose fat and build lean muscle. Lower your risk for serious health problems. Keep your bones, muscles, and joints strong. Being fit lets you do more physical activity. And it lets you work out harder without as much effort. How can you make physical activity part of your life? Get at least 30 minutes of exercise on most days of the week. Walking is a good choice. You also may want to do other activities, such as running, swimming, cycling, or playing tennis or team sports.   Pick activities that you like--ones that make your heart beat faster, your muscles stronger, and your muscles and joints more flexible. If you find more than one thing you like doing, do them all. You don't have to do the same thing every day. Get your heart pumping every day. Any activity that makes your heart beat faster and keeps it at that rate for a while counts. Here are some great ways to get your heart beating faster:  Go for a brisk walk, run, or bike ride. Go for a hike or swim. Go in-line skating. Play a game of touch football, basketball, or soccer. Ride a bike. Play tennis or racquetball. Climb stairs. Even some household chores can be aerobic--just do them at a faster pace. Vacuuming, raking or mowing the lawn, sweeping the garage, and washing and waxing the car all can help get your heart rate up. Strengthen your muscles during the week. You don't have to lift heavy weights or grow big, bulky muscles to get stronger. Doing a few simple activities that make your muscles work against, or \"resist,\" something can help you get stronger. For example, you can:  Do push-ups or sit-ups, which use your own body weight as resistance. Lift weights or dumbbells or use stretch bands at home or in a gym or community center. Stretch your muscles often. Stretching will help you as you become more active. It can help you stay flexible, loosen tight muscles, and avoid injury. It can also help improve your balance and posture and can be a great way to relax. Be sure to stretch the muscles you'll be using when you work out. Its best to warm your muscles slightly before you stretch them. Walk or do some other light aerobic activity for a few minutes, and then start stretching. When you stretch your muscles:  Do it slowly. Stretching is not about going fast or making sudden movements. Don't push or bounce during a stretch. Hold each stretch for at least 15 to 30 seconds, if you can.  You should feel a stretch in the muscle, but not pain.  Breathe out as you do the stretch. Then breathe in as you hold the stretch. Don't hold your breath. If you're worried about how more activity might affect your health, have a checkup before you start. Follow any special advice your doctor gives you for getting a smart start. Where can you learn more? Go to [employer].be  Enter [id number] in the search box to learn more about \"Learning About Physical Activity. \"   © 8802-7810 Healthwise, [employer]. Care instructions adapted under license by [facility] [employer] (which disclaims liability or warranty for this information). This care instruction is for use with your licensed healthcare professional. If you have questions about a medical condition or this instruction, always ask your healthcare professional. [address] any warranty or liability for your use of this information. Content Version: [id number]; Current as of: November 15, 2013                                          Learning About CPAP for Sleep Apnea  What is CPAP? CPAP is a small machine that you use at home every night while you sleep. It increases air pressure in your throat to keep your airway open. When you have sleep apnea, this can help you sleep better so you feel much better. CPAP stands for \"continuous positive airway pressure. \"  The CPAP machine will have one of the following:  A mask that covers your nose and mouth  Prongs that fit into your nose  A mask that covers your nose only, the most common type. This type is called NCPAP. The N stands for \"nasal.\"  Why is it done? CPAP is usually the best treatment for obstructive sleep apnea. It is the first treatment choice and the most widely used. Your doctor may suggest CPAP if you have: Moderate to severe sleep apnea. Sleep apnea and coronary artery disease (CAD) or heart failure. How does it help?   CPAP can help you have more normal sleep, so you feel less sleepy and more alert during the daytime. CPAP may help keep heart failure or other heart problems from getting worse. CPAP may help lower your blood pressure. If you use CPAP, your bed partner may also sleep better because you are not snoring or restless. What are the side effects? Some people who use CPAP have:  A dry or stuffy nose and a sore throat. Irritated skin on the face. Sore eyes. Bloating. If you have any of these problems, work with your doctor to fix them. Here are some things you can try:  Be sure the mask or nasal prongs fit well. See if your doctor can adjust the pressure of your CPAP. If your nose is dry, try a humidifier. If your nose is runny or stuffy, try decongestant medicine or a steroid nasal spray. Be safe with medicines. Read and follow all instructions on the label. Do not use the medicine longer than the label says. If these things do not help, you might try a different type of machine. Some machines have air pressure that adjusts on its own. Others have air pressures that are different when you breathe in than when you breathe out. This may reduce discomfort caused by too much pressure in your nose. Where can you learn more? Go to [employer]  Enter Belinda Lamb in the search box to learn more about \"Learning About CPAP for Sleep Apnea. \"   © 8653-3129 Healthwise, Incorporated. Care instructions adapted under license by [facility] [employer] (which disclaims liability or warranty for this information). This care instruction is for use with your licensed healthcare professional. If you have questions about a medical condition or this instruction, always ask your healthcare professional. [place] [address] any warranty or liability for your use of this information. Content Version: 52.1.136779; Current as of: January 24, 2014      supplies is:     Address: [address] [place] [address] [address], [address]  Phone: [phone] Option #1  Fax: [phone]

## 2022-12-26 DIAGNOSIS — E03.9 HYPOTHYROIDISM, UNSPECIFIED TYPE: ICD-10-CM

## 2022-12-27 RX ORDER — LEVOTHYROXINE SODIUM 0.1 MG/1
TABLET ORAL
Qty: 90 TABLET | Refills: 0 | OUTPATIENT
Start: 2022-12-27

## 2022-12-29 RX ORDER — VENLAFAXINE HYDROCHLORIDE 150 MG/1
300 CAPSULE, EXTENDED RELEASE ORAL DAILY
Qty: 60 CAPSULE | Refills: 0 | Status: SHIPPED | OUTPATIENT
Start: 2022-12-29 | End: 2023-01-05 | Stop reason: SDUPTHER

## 2023-01-05 ENCOUNTER — OFFICE VISIT (OUTPATIENT)
Dept: FAMILY MEDICINE CLINIC | Facility: CLINIC | Age: 68
End: 2023-01-05
Payer: MEDICARE

## 2023-01-05 ENCOUNTER — HOSPITAL ENCOUNTER (OUTPATIENT)
Dept: GENERAL RADIOLOGY | Age: 68
Discharge: HOME | End: 2023-01-05
Payer: MEDICARE

## 2023-01-05 VITALS
HEIGHT: 62 IN | WEIGHT: 266.4 LBS | HEART RATE: 86 BPM | TEMPERATURE: 98.3 F | OXYGEN SATURATION: 96 % | DIASTOLIC BLOOD PRESSURE: 84 MMHG | BODY MASS INDEX: 49.02 KG/M2 | SYSTOLIC BLOOD PRESSURE: 148 MMHG

## 2023-01-05 DIAGNOSIS — F32.A DEPRESSION, UNSPECIFIED DEPRESSION TYPE: ICD-10-CM

## 2023-01-05 DIAGNOSIS — R05.9 COUGH, UNSPECIFIED TYPE: ICD-10-CM

## 2023-01-05 DIAGNOSIS — E55.9 VITAMIN D DEFICIENCY: ICD-10-CM

## 2023-01-05 DIAGNOSIS — E03.9 HYPOTHYROIDISM, UNSPECIFIED TYPE: ICD-10-CM

## 2023-01-05 DIAGNOSIS — I10 PRIMARY HYPERTENSION: ICD-10-CM

## 2023-01-05 DIAGNOSIS — J40 BRONCHITIS: ICD-10-CM

## 2023-01-05 DIAGNOSIS — J30.1 ALLERGIC RHINITIS DUE TO POLLEN, UNSPECIFIED SEASONALITY: ICD-10-CM

## 2023-01-05 DIAGNOSIS — E78.2 MIXED HYPERLIPIDEMIA: ICD-10-CM

## 2023-01-05 DIAGNOSIS — J45.909 MILD ASTHMA WITHOUT COMPLICATION, UNSPECIFIED WHETHER PERSISTENT: Primary | ICD-10-CM

## 2023-01-05 LAB
ALBUMIN SERPL-MCNC: 3.9 G/DL (ref 3.2–4.6)
ALBUMIN/GLOB SERPL: 1.1 {RATIO} (ref 0.4–1.6)
ALP SERPL-CCNC: 146 U/L (ref 50–136)
ALT SERPL-CCNC: 29 U/L (ref 12–65)
ANION GAP SERPL CALC-SCNC: 5 MMOL/L (ref 2–11)
AST SERPL-CCNC: 21 U/L (ref 15–37)
BASOPHILS # BLD: 0 K/UL (ref 0–0.2)
BASOPHILS NFR BLD: 0 % (ref 0–2)
BILIRUB SERPL-MCNC: 0.4 MG/DL (ref 0.2–1.1)
BUN SERPL-MCNC: 15 MG/DL (ref 8–23)
CALCIUM SERPL-MCNC: 9 MG/DL (ref 8.3–10.4)
CHLORIDE SERPL-SCNC: 109 MMOL/L (ref 101–110)
CHOLEST SERPL-MCNC: 200 MG/DL
CO2 SERPL-SCNC: 27 MMOL/L (ref 21–32)
CREAT SERPL-MCNC: 0.9 MG/DL (ref 0.6–1)
DIFFERENTIAL METHOD BLD: ABNORMAL
EOSINOPHIL # BLD: 0 K/UL (ref 0–0.8)
EOSINOPHIL NFR BLD: 0 % (ref 0.5–7.8)
ERYTHROCYTE [DISTWIDTH] IN BLOOD BY AUTOMATED COUNT: 13.7 % (ref 11.9–14.6)
GLOBULIN SER CALC-MCNC: 3.6 G/DL (ref 2.8–4.5)
GLUCOSE SERPL-MCNC: 109 MG/DL (ref 65–100)
HCT VFR BLD AUTO: 41.6 % (ref 35.8–46.3)
HDLC SERPL-MCNC: 85 MG/DL (ref 40–60)
HDLC SERPL: 2.4 {RATIO}
HGB BLD-MCNC: 13.3 G/DL (ref 11.7–15.4)
IMM GRANULOCYTES # BLD AUTO: 0 K/UL (ref 0–0.5)
IMM GRANULOCYTES NFR BLD AUTO: 0 % (ref 0–5)
LDLC SERPL CALC-MCNC: 90.8 MG/DL
LYMPHOCYTES # BLD: 1.4 K/UL (ref 0.5–4.6)
LYMPHOCYTES NFR BLD: 25 % (ref 13–44)
MCH RBC QN AUTO: 29.2 PG (ref 26.1–32.9)
MCHC RBC AUTO-ENTMCNC: 32 G/DL (ref 31.4–35)
MCV RBC AUTO: 91.2 FL (ref 82–102)
MONOCYTES # BLD: 0.5 K/UL (ref 0.1–1.3)
MONOCYTES NFR BLD: 8 % (ref 4–12)
NEUTS SEG # BLD: 3.6 K/UL (ref 1.7–8.2)
NEUTS SEG NFR BLD: 67 % (ref 43–78)
NRBC # BLD: 0 K/UL (ref 0–0.2)
PLATELET # BLD AUTO: 272 K/UL (ref 150–450)
PMV BLD AUTO: 10.3 FL (ref 9.4–12.3)
POTASSIUM SERPL-SCNC: 4.3 MMOL/L (ref 3.5–5.1)
PROT SERPL-MCNC: 7.5 G/DL (ref 6.3–8.2)
RBC # BLD AUTO: 4.56 M/UL (ref 4.05–5.2)
SODIUM SERPL-SCNC: 141 MMOL/L (ref 133–143)
TRIGL SERPL-MCNC: 121 MG/DL (ref 35–150)
TSH, 3RD GENERATION: 3.04 UIU/ML (ref 0.36–3.74)
VLDLC SERPL CALC-MCNC: 24.2 MG/DL (ref 6–23)
WBC # BLD AUTO: 5.5 K/UL (ref 4.3–11.1)

## 2023-01-05 PROCEDURE — 3077F SYST BP >= 140 MM HG: CPT | Performed by: NURSE PRACTITIONER

## 2023-01-05 PROCEDURE — 3078F DIAST BP <80 MM HG: CPT | Performed by: NURSE PRACTITIONER

## 2023-01-05 PROCEDURE — 99214 OFFICE O/P EST MOD 30 MIN: CPT | Performed by: NURSE PRACTITIONER

## 2023-01-05 PROCEDURE — 1123F ACP DISCUSS/DSCN MKR DOCD: CPT | Performed by: NURSE PRACTITIONER

## 2023-01-05 PROCEDURE — 71046 X-RAY EXAM CHEST 2 VIEWS: CPT

## 2023-01-05 RX ORDER — AZITHROMYCIN 250 MG/1
TABLET, FILM COATED ORAL
Qty: 6 TABLET | Refills: 0 | Status: SHIPPED | OUTPATIENT
Start: 2023-01-05

## 2023-01-05 RX ORDER — FLUTICASONE PROPIONATE 50 MCG
2 SPRAY, SUSPENSION (ML) NASAL DAILY
Qty: 16 G | Refills: 2 | Status: SHIPPED | OUTPATIENT
Start: 2023-01-05

## 2023-01-05 RX ORDER — LEVOTHYROXINE SODIUM 0.1 MG/1
100 TABLET ORAL
Qty: 90 TABLET | Refills: 0 | Status: SHIPPED | OUTPATIENT
Start: 2023-01-05

## 2023-01-05 RX ORDER — ALBUTEROL SULFATE 90 UG/1
2 AEROSOL, METERED RESPIRATORY (INHALATION) EVERY 6 HOURS PRN
Qty: 18 G | Refills: 1 | Status: SHIPPED | OUTPATIENT
Start: 2023-01-05

## 2023-01-05 RX ORDER — FLUTICASONE PROPIONATE 0.05 MG/G
OINTMENT TOPICAL
COMMUNITY
Start: 2022-12-30

## 2023-01-05 RX ORDER — LOSARTAN POTASSIUM 50 MG/1
50 TABLET ORAL DAILY
Qty: 90 TABLET | Refills: 1 | Status: SHIPPED | OUTPATIENT
Start: 2023-01-05

## 2023-01-05 RX ORDER — ATENOLOL 50 MG/1
50 TABLET ORAL DAILY
Qty: 90 TABLET | Refills: 1 | Status: SHIPPED | OUTPATIENT
Start: 2023-01-05

## 2023-01-05 RX ORDER — LEVOTHYROXINE SODIUM 88 UG/1
88 TABLET ORAL
Qty: 90 TABLET | Refills: 1 | Status: SHIPPED | OUTPATIENT
Start: 2023-01-05

## 2023-01-05 RX ORDER — VENLAFAXINE HYDROCHLORIDE 150 MG/1
300 CAPSULE, EXTENDED RELEASE ORAL DAILY
Qty: 180 CAPSULE | Refills: 1 | Status: SHIPPED | OUTPATIENT
Start: 2023-01-05

## 2023-01-05 RX ORDER — ALPRAZOLAM 0.5 MG/1
0.5 TABLET ORAL DAILY PRN
Status: CANCELLED | OUTPATIENT
Start: 2023-01-05

## 2023-01-05 ASSESSMENT — ENCOUNTER SYMPTOMS
ALLERGIC/IMMUNOLOGIC NEGATIVE: 1
GASTROINTESTINAL NEGATIVE: 1
EYES NEGATIVE: 1
COUGH: 1

## 2023-01-05 ASSESSMENT — PATIENT HEALTH QUESTIONNAIRE - PHQ9
SUM OF ALL RESPONSES TO PHQ9 QUESTIONS 1 & 2: 1
SUM OF ALL RESPONSES TO PHQ QUESTIONS 1-9: 1
2. FEELING DOWN, DEPRESSED OR HOPELESS: 1
SUM OF ALL RESPONSES TO PHQ QUESTIONS 1-9: 1
1. LITTLE INTEREST OR PLEASURE IN DOING THINGS: 0

## 2023-01-05 NOTE — PROGRESS NOTES
[address]  [address] [place] [address], [address] [facility]   (ph) [phone] (fax) [phone]  Lolis CHAPPELL, FNP-C      Chief Complaint   Patient presents with    Medication Refill    3 Month Follow-Up       79 year-old white female comes into the office today to follow-up on chronic conditions. She is followed for hypertension,  GERD, anxiety, and depression. She is scheduled to see Dr. Amy Villafana in February. She sees her daughter and grandchildren much more and is in great spirits today. Colonoscopy was in July 2021, repeat in 2026. She reports morning headaches have decreased. Still reports occasional fatigue. She reports a hx of mild sleep apnea but has gained some weight over the past several months. She went  for the sleep study and goes tomorrow to get her CPAP. She is c/o a cough for over two weeks and feels she has bronchitis. Medication Refill  Associated symptoms include coughing and fatigue.      Allergies   Allergen Reactions    Latex Other (See Comments)     Burns; blisters    Cephalexin Hives    Lisinopril Other (See Comments)    Penicillins Hives    Sulfa Antibiotics Hives       Past Medical History:   Diagnosis Date    Adverse effect of anesthesia     hard to wake up    Allergic rhinitis     Asthma     Autoimmune disease ([address]     fibromyalgia    DDD (degenerative disc disease), lumbar     Depression     Diabetes ([address]     pre-diabetes    Diverticulitis 2016    diagnosed at Colonoscopy    Fibromyalgia     Fibromyalgia     Fracture 2016    right foot    GERD (gastroesophageal reflux disease)     Herpes genitalis in women     dormant    Hypertension     IBS (irritable bowel syndrome)     Morbid obesity ([address]     Primary hypothyroidism     PUD (peptic ulcer disease)     Sleep apnea     had 2 studies    Status post right knee replacement 7/25/2018       Family History   Problem Relation Age of Onset    Thyroid Disease Daughter Hypothyroidism    Breast Cancer Neg Hx     Diabetes Sister     Cancer Mother         Lung    GERD Father     Hypertension Father     COPD Father        Social History     Socioeconomic History    Marital status:      Spouse name: Not on file    Number of children: Not on file    Years of education: Not on file    Highest education level: Not on file   Occupational History    Not on file   Tobacco Use    Smoking status: Never    Smokeless tobacco: Never   Substance and Sexual Activity    Alcohol use: Yes    Drug use: No    Sexual activity: Not on file     Comment:    Other Topics Concern    Not on file   Social History Narrative    Not on file     Social Determinants of Health     Financial Resource Strain: Low Risk     Difficulty of Paying Living Expenses: Not very hard   Food Insecurity: No Food Insecurity    Worried About Running Out of Food in the Last Year: Never true    [address] in the Last Year: Never true   Transportation Needs: No Transportation Needs    Lack of Transportation (Medical): No    Lack of Transportation (Non-Medical):  No   Physical Activity: Not on file   Stress: Not on file   Social Connections: Not on file   Intimate Partner Violence: Not on file   Housing Stability: Not on file       OB History          3    Para        Term                AB        Living             SAB        IAB        Ectopic        Molar        Multiple        Live Births                    Past Surgical History:   Procedure Laterality Date    COLONOSCOPY  2016    HYSTERECTOMY (CERVIX STATUS UNKNOWN)      TUBAL LIGATION      UPPER GASTROINTESTINAL ENDOSCOPY      during colonoscopy       Health Maintenance   Topic Date Due    DTaP/Tdap/Td vaccine (1 - Tdap) Never done    COVID-19 Vaccine (4 - Booster for Moderna series) 03/10/2022    Flu vaccine (1) 2022    Annual Wellness Visit (AWV)  2023    Breast cancer screen  2023    Depression Monitoring  2023 Pneumococcal 65+ years Vaccine (3 - PPSV23 if available, else PCV20) 09/27/2023    Colorectal Cancer Screen  07/08/2024    Lipids  06/01/2027    DEXA (modify frequency per FRAX score)  Completed    Shingles vaccine  Completed    Hepatitis C screen  Completed    Hepatitis A vaccine  Aged Out    Hib vaccine  Aged Out    Meningococcal (ACWY) vaccine  Aged Out         Current Outpatient Medications:     fluticasone (CUTIVATE) 0.005 % ointment, APPLY TO AFFECTED AREAS TWICE DAILY X2 WEEKS FOR FLARES, Disp: , Rfl:     albuterol sulfate HFA (PROVENTIL;VENTOLIN;PROAIR) 108 (90 Base) MCG/ACT inhaler, Inhale 2 puffs into the lungs every 6 hours as needed for Wheezing, Disp: 18 g, Rfl: 1    atenolol (TENORMIN) 50 MG tablet, Take 1 tablet by mouth daily TAKE 1 TABLET EVERY DAY, Disp: 90 tablet, Rfl: 1    Cholecalciferol 50 MCG (2000 UT) CAPS, Take 2,000 Units by mouth daily, Disp: 90 capsule, Rfl: 1    fluticasone (FLONASE) 50 MCG/ACT nasal spray, 2 sprays by Nasal route daily, Disp: 16 g, Rfl: 2    levothyroxine (SYNTHROID) 100 MCG tablet, Take 1 tablet by mouth every morning (before breakfast), Disp: 90 tablet, Rfl: 0    levothyroxine (SYNTHROID) 88 MCG tablet, Take 1 tablet by mouth every morning (before breakfast), Disp: 90 tablet, Rfl: 1    losartan (COZAAR) 50 MG tablet, Take 1 tablet by mouth daily, Disp: 90 tablet, Rfl: 1    venlafaxine (EFFEXOR XR) 150 MG extended release capsule, Take 2 capsules by mouth daily, Disp: 180 capsule, Rfl: 1    azithromycin (ZITHROMAX) 250 MG tablet, 500mg on day 1 followed by 250mg on days 2 - 5, Disp: 6 tablet, Rfl: 0    ALPRAZolam (XANAX) 0.5 MG tablet, Take 0.5 mg by mouth daily as needed. , Disp: , Rfl:     albuterol (PROVENTIL) (2.5 MG/3ML) 0.083% nebulizer solution, Take 3 mLs by nebulization every 6 hours as needed for Wheezing, Disp: 50 each, Rfl: 1    loratadine (CLARITIN) 10 MG tablet, Take 1 tablet by mouth daily, Disp: 90 tablet, Rfl: 1    montelukast (SINGULAIR) 10 MG tablet, Take 1 tablet by mouth daily, Disp: 90 tablet, Rfl: 1    Review of Systems   Constitutional:  Positive for fatigue. HENT: Negative. Eyes: Negative. Respiratory:  Positive for cough. Reports bronchitis   Cardiovascular: Negative. Gastrointestinal: Negative. Endocrine: Negative. Genitourinary: Negative. Musculoskeletal: Negative. Skin: Negative. Allergic/Immunologic: Negative. Neurological: Negative. Hematological: Negative. Psychiatric/Behavioral: Negative. Vitals:    01/05/23 1414   BP: (!) 143/79   Pulse: 97   Temp: 98.3 °F (36.8 °C)   SpO2: 96%        Physical Exam  Constitutional:       Appearance: Normal appearance. HENT:      Head: Normocephalic. Nose: Nose normal.   Eyes:      Extraocular Movements: Extraocular movements intact. Pupils: Pupils are equal, round, and reactive to light. Cardiovascular:      Rate and Rhythm: Normal rate and regular rhythm. Pulmonary:      Effort: Pulmonary effort is normal.      Breath sounds: Normal breath sounds. Abdominal:      General: Abdomen is flat. Palpations: Abdomen is soft. Musculoskeletal:         General: Normal range of motion. Cervical back: Normal range of motion and neck supple. Skin:     General: Skin is warm and dry. Neurological:      General: No focal deficit present. Mental Status: She is alert and oriented to person, place, and time. Psychiatric:         Mood and Affect: Mood normal.         Behavior: Behavior normal.        PHQ-9=1      Assessment & Plan:    1. Mild asthma without complication, unspecified whether persistent  Stable  - albuterol sulfate HFA (PROVENTIL;VENTOLIN;PROAIR) 108 (90 Base) MCG/ACT inhaler; Inhale 2 puffs into the lungs every 6 hours as needed for Wheezing  Dispense: 18 g; Refill: 1    2. Primary hypertension  stable  - atenolol (TENORMIN) 50 MG tablet; Take 1 tablet by mouth daily Dispense: 90 tablet;  Refill: 1  - losartan (COZAAR) 50 MG tablet; Take 1 tablet by mouth daily  Dispense: 90 tablet; Refill: 1  - Comprehensive Metabolic Panel; Future  - CBC with Auto Differential; Future  - CBC with Auto Differential  - Comprehensive Metabolic Panel    3. Vitamin D deficiency  stable  - Cholecalciferol 50 MCG (2000 UT) CAPS; Take 2,000 Units by mouth daily  Dispense: 90 capsule; Refill: 1  - Vitamin D 25 Hydroxy; Future  - Vitamin D 25 Hydroxy    4. Allergic rhinitis due to pollen, unspecified seasonality  stable  - fluticasone (FLONASE) 50 MCG/ACT nasal spray; 2 sprays by Nasal route daily  Dispense: 16 g; Refill: 2    5. Hypothyroidism, unspecified type  stable  - levothyroxine (SYNTHROID) 100 MCG tablet; Take 1 tablet by mouth every morning (before breakfast)  Dispense: 90 tablet; Refill: 0  - levothyroxine (SYNTHROID) 88 MCG tablet; Take 1 tablet by mouth every morning (before breakfast)  Dispense: 90 tablet; Refill: 1  - TSH; Future  - TSH    6. Mixed hyperlipidemia  stable  - Lipid Panel; Future  - Lipid Panel    7. Bronchitis  - azithromycin (ZITHROMAX) 250 MG tablet; 500mg on day 1 followed by 250mg on days 2 - 5  Dispense: 6 tablet; Refill: 0    8. Cough, unspecified type  - XR CHEST PA LAT (2 VIEWS); Future    9. Depression, unspecified depression type  Stable; continue med  - venlafaxine (EFFEXOR XR) 150 MG extended release capsule; Take 2 capsules by mouth daily  Dispense: 180 capsule; Refill: 1      Greater than 50% counseling and/or coordination of care: the treatment regimen is extensive; detailed review. Will notify of labs/xray. Recheck in 3 months. This note was dictated using dragon voice recognition software. It has been proofread, but there may still exist voice recognition errors that the author did not detect.       Signed By: MISTI Cintron - CNP     January 5, 2023

## 2023-01-06 LAB — 25(OH)D3 SERPL-MCNC: 17.8 NG/ML (ref 30–100)

## 2023-01-07 NOTE — RESULT ENCOUNTER NOTE
Results to patient please. Vitamin D is decreased. Does she still take vitamin D daily? Hemoglobin is 13.3. TSH is normal.  Total cholesterol is 200 triglycerides 121, HDL 85, and LDL is 90.8. Glucose 109.   Liver enzymes are normal.  Keep scheduled follow-up  Thanks

## 2023-03-23 ENCOUNTER — TELEPHONE (OUTPATIENT)
Dept: SLEEP MEDICINE | Age: 68
End: 2023-03-23

## 2023-03-23 NOTE — TELEPHONE ENCOUNTER
Pt is scheduled for a VV on 3/24/23 with Dr Miguel A Desai. Her machine does not transmit therefore she has to have an in office appt.  Left msg informing pt that she can either come in at the appointment time or re-schedule

## 2023-04-18 ENCOUNTER — OFFICE VISIT (OUTPATIENT)
Dept: SLEEP MEDICINE | Age: 68
End: 2023-04-18
Payer: MEDICARE

## 2023-04-18 VITALS
HEART RATE: 88 BPM | DIASTOLIC BLOOD PRESSURE: 82 MMHG | HEIGHT: 62 IN | OXYGEN SATURATION: 97 % | RESPIRATION RATE: 18 BRPM | WEIGHT: 273.4 LBS | BODY MASS INDEX: 50.31 KG/M2 | TEMPERATURE: 97.1 F | SYSTOLIC BLOOD PRESSURE: 152 MMHG

## 2023-04-18 DIAGNOSIS — G47.33 OSA (OBSTRUCTIVE SLEEP APNEA): Primary | ICD-10-CM

## 2023-04-18 DIAGNOSIS — G47.10 HYPERSOMNIA: ICD-10-CM

## 2023-04-18 DIAGNOSIS — G47.34 NOCTURNAL HYPOXEMIA: ICD-10-CM

## 2023-04-18 PROCEDURE — 3079F DIAST BP 80-89 MM HG: CPT | Performed by: PHYSICIAN ASSISTANT

## 2023-04-18 PROCEDURE — 3077F SYST BP >= 140 MM HG: CPT | Performed by: PHYSICIAN ASSISTANT

## 2023-04-18 PROCEDURE — 99213 OFFICE O/P EST LOW 20 MIN: CPT | Performed by: PHYSICIAN ASSISTANT

## 2023-04-18 PROCEDURE — 1123F ACP DISCUSS/DSCN MKR DOCD: CPT | Performed by: PHYSICIAN ASSISTANT

## 2023-04-18 ASSESSMENT — SLEEP AND FATIGUE QUESTIONNAIRES
HOW LIKELY ARE YOU TO NOD OFF OR FALL ASLEEP IN A CAR, WHILE STOPPED FOR A FEW MINUTES IN TRAFFIC: 1
HOW LIKELY ARE YOU TO NOD OFF OR FALL ASLEEP WHILE SITTING AND READING: 2
HOW LIKELY ARE YOU TO NOD OFF OR FALL ASLEEP WHILE SITTING QUIETLY AFTER LUNCH WITHOUT ALCOHOL: 3
HOW LIKELY ARE YOU TO NOD OFF OR FALL ASLEEP WHILE WATCHING TV: 2
HOW LIKELY ARE YOU TO NOD OFF OR FALL ASLEEP WHILE SITTING INACTIVE IN A PUBLIC PLACE: 1
HOW LIKELY ARE YOU TO NOD OFF OR FALL ASLEEP WHILE LYING DOWN TO REST IN THE AFTERNOON WHEN CIRCUMSTANCES PERMIT: 2
HOW LIKELY ARE YOU TO NOD OFF OR FALL ASLEEP WHILE SITTING AND TALKING TO SOMEONE: 0
HOW LIKELY ARE YOU TO NOD OFF OR FALL ASLEEP WHEN YOU ARE A PASSENGER IN A CAR FOR AN HOUR WITHOUT A BREAK: 3
ESS TOTAL SCORE: 14

## 2023-04-18 NOTE — PATIENT INSTRUCTIONS
I would like you to try extended release or time release melatonin. I would like you to start with 5mg tablets. You should take the melatonin 60-90 mins before going to bed. This should help you fall asleep but also help you stay asleep without making you groggy. If 5mg is not enough, you could take 2 tablets to make it 10mg but I would not recommend going higher on your dose. If it is not working, please call or send me a [employer] message.

## 2023-04-18 NOTE — PROGRESS NOTES
Take 1 tablet by mouth daily    ALPRAZolam (XANAX) 0.5 MG tablet Take 1 tablet by mouth daily as needed. azithromycin (ZITHROMAX) 250 MG tablet 500mg on day 1 followed by 250mg on days 2 - 5 (Patient not taking: Reported on 4/18/2023)     No current facility-administered medications for this visit. REVIEW OF SYSTEMS:   CONSTITUTIONAL:   There is no history of fever, chills, night sweats, weight loss, weight gain, persistent fatigue, or lethargy/hypersomnolence. CARDIAC:   No chest pain, pressure, discomfort, palpitations, orthopnea, murmurs, or edema. GI:   No dysphagia, heartburn reflux, nausea/vomiting, diarrhea, abdominal pain, or bleeding. NEURO:   There is no history of AMS, persistent headache, decreased level of consciousness, seizures, or motor or sensory deficits. PHYSICAL EXAM:    Vitals:    04/18/23 1255   BP: (!) 152/82   Pulse: 88   Resp: 18   Temp: 97.1 °F (36.2 °C)   SpO2: 97%   Weight: 273 lb 6.4 oz (124 kg)   Height: 5' 2\" (1.575 m)             GENERAL APPEARANCE:   The patient is morbidly obese and in no respiratory distress, on RA. HEENT:   PERRL. Conjunctivae unremarkable. Nasal mucosa is without epistaxis, exudate, or polyps. Gums and dentition are unremarkable. NECK/LYMPHATIC:   Symmetrical with no elevation of jugular venous pulsation. Trachea midline. No thyroid enlargement. No cervical adenopathy. LUNGS:   Normal respiratory effort with symmetrical lung expansion. Breath sounds distant but clear. HEART:   There is a regular rate and rhythm. No murmur, rub, or gallop. There is no edema in the lower extremities. ABDOMEN:   Soft and non-tender. No hepatosplenomegaly. Bowel sounds are normal.     NEURO:   The patient is alert and oriented to person, place, and time. Memory appears intact and mood is normal.  No gross sensorimotor deficits are present. ASSESSMENT:  (Medical Decision Making)      Diagnosis Orders   1.  HAIR (obstructive sleep

## 2023-04-28 DIAGNOSIS — E03.9 HYPOTHYROIDISM, UNSPECIFIED TYPE: ICD-10-CM

## 2023-05-03 RX ORDER — LEVOTHYROXINE SODIUM 0.1 MG/1
TABLET ORAL
Qty: 90 TABLET | Refills: 0 | OUTPATIENT
Start: 2023-05-03

## 2023-05-18 ENCOUNTER — OFFICE VISIT (OUTPATIENT)
Dept: FAMILY MEDICINE CLINIC | Facility: CLINIC | Age: 68
End: 2023-05-18
Payer: MEDICARE

## 2023-05-18 VITALS
TEMPERATURE: 98.4 F | SYSTOLIC BLOOD PRESSURE: 139 MMHG | DIASTOLIC BLOOD PRESSURE: 85 MMHG | BODY MASS INDEX: 50.09 KG/M2 | HEIGHT: 62 IN | HEART RATE: 70 BPM | WEIGHT: 272.2 LBS | OXYGEN SATURATION: 97 %

## 2023-05-18 DIAGNOSIS — Z23 ENCOUNTER FOR IMMUNIZATION: ICD-10-CM

## 2023-05-18 DIAGNOSIS — R53.82 CHRONIC FATIGUE: ICD-10-CM

## 2023-05-18 DIAGNOSIS — E03.9 PRIMARY HYPOTHYROIDISM: ICD-10-CM

## 2023-05-18 DIAGNOSIS — I10 PRIMARY HYPERTENSION: ICD-10-CM

## 2023-05-18 DIAGNOSIS — M79.7 FIBROMYALGIA: ICD-10-CM

## 2023-05-18 DIAGNOSIS — E55.9 VITAMIN D DEFICIENCY: ICD-10-CM

## 2023-05-18 DIAGNOSIS — Z12.39 BREAST SCREENING: ICD-10-CM

## 2023-05-18 DIAGNOSIS — E66.01 CLASS 3 SEVERE OBESITY DUE TO EXCESS CALORIES WITHOUT SERIOUS COMORBIDITY WITH BODY MASS INDEX (BMI) OF 45.0 TO 49.9 IN ADULT (HCC): ICD-10-CM

## 2023-05-18 DIAGNOSIS — J30.1 ALLERGIC RHINITIS DUE TO POLLEN, UNSPECIFIED SEASONALITY: ICD-10-CM

## 2023-05-18 DIAGNOSIS — E78.2 MIXED HYPERLIPIDEMIA: ICD-10-CM

## 2023-05-18 DIAGNOSIS — F32.A DEPRESSION, UNSPECIFIED DEPRESSION TYPE: ICD-10-CM

## 2023-05-18 DIAGNOSIS — I10 PRIMARY HYPERTENSION: Primary | ICD-10-CM

## 2023-05-18 DIAGNOSIS — J45.20 MILD INTERMITTENT ASTHMA WITHOUT COMPLICATION: ICD-10-CM

## 2023-05-18 DIAGNOSIS — J30.1 SEASONAL ALLERGIC RHINITIS DUE TO POLLEN: ICD-10-CM

## 2023-05-18 DIAGNOSIS — J45.909 MILD ASTHMA WITHOUT COMPLICATION, UNSPECIFIED WHETHER PERSISTENT: ICD-10-CM

## 2023-05-18 DIAGNOSIS — Z78.0 POSTMENOPAUSE: ICD-10-CM

## 2023-05-18 LAB
25(OH)D3 SERPL-MCNC: 26.4 NG/ML (ref 30–100)
ALBUMIN SERPL-MCNC: 3.7 G/DL (ref 3.2–4.6)
ALBUMIN/GLOB SERPL: 1.1 (ref 0.4–1.6)
ALP SERPL-CCNC: 139 U/L (ref 50–136)
ALT SERPL-CCNC: 29 U/L (ref 12–65)
ANION GAP SERPL CALC-SCNC: 6 MMOL/L (ref 2–11)
AST SERPL-CCNC: 19 U/L (ref 15–37)
BASOPHILS # BLD: 0 K/UL (ref 0–0.2)
BASOPHILS NFR BLD: 0 % (ref 0–2)
BILIRUB SERPL-MCNC: 0.4 MG/DL (ref 0.2–1.1)
BUN SERPL-MCNC: 16 MG/DL (ref 8–23)
CALCIUM SERPL-MCNC: 9 MG/DL (ref 8.3–10.4)
CHLORIDE SERPL-SCNC: 106 MMOL/L (ref 101–110)
CHOLEST SERPL-MCNC: 186 MG/DL
CO2 SERPL-SCNC: 27 MMOL/L (ref 21–32)
CREAT SERPL-MCNC: 0.8 MG/DL (ref 0.6–1)
DIFFERENTIAL METHOD BLD: ABNORMAL
EOSINOPHIL # BLD: 0 K/UL (ref 0–0.8)
EOSINOPHIL NFR BLD: 0 % (ref 0.5–7.8)
ERYTHROCYTE [DISTWIDTH] IN BLOOD BY AUTOMATED COUNT: 13.9 % (ref 11.9–14.6)
GLOBULIN SER CALC-MCNC: 3.4 G/DL (ref 2.8–4.5)
GLUCOSE SERPL-MCNC: 95 MG/DL (ref 65–100)
HCT VFR BLD AUTO: 38.5 % (ref 35.8–46.3)
HDLC SERPL-MCNC: 81 MG/DL (ref 40–60)
HDLC SERPL: 2.3
HGB BLD-MCNC: 12.5 G/DL (ref 11.7–15.4)
IMM GRANULOCYTES # BLD AUTO: 0 K/UL (ref 0–0.5)
IMM GRANULOCYTES NFR BLD AUTO: 0 % (ref 0–5)
LDLC SERPL CALC-MCNC: 79 MG/DL
LYMPHOCYTES # BLD: 1.1 K/UL (ref 0.5–4.6)
LYMPHOCYTES NFR BLD: 23 % (ref 13–44)
MCH RBC QN AUTO: 29.5 PG (ref 26.1–32.9)
MCHC RBC AUTO-ENTMCNC: 32.5 G/DL (ref 31.4–35)
MCV RBC AUTO: 90.8 FL (ref 82–102)
MONOCYTES # BLD: 0.4 K/UL (ref 0.1–1.3)
MONOCYTES NFR BLD: 9 % (ref 4–12)
NEUTS SEG # BLD: 3.3 K/UL (ref 1.7–8.2)
NEUTS SEG NFR BLD: 68 % (ref 43–78)
NRBC # BLD: 0 K/UL (ref 0–0.2)
PLATELET # BLD AUTO: 235 K/UL (ref 150–450)
PMV BLD AUTO: 10.6 FL (ref 9.4–12.3)
POTASSIUM SERPL-SCNC: 4.3 MMOL/L (ref 3.5–5.1)
PROT SERPL-MCNC: 7.1 G/DL (ref 6.3–8.2)
RBC # BLD AUTO: 4.24 M/UL (ref 4.05–5.2)
SODIUM SERPL-SCNC: 139 MMOL/L (ref 133–143)
TRIGL SERPL-MCNC: 130 MG/DL (ref 35–150)
TSH W FREE THYROID IF ABNORMAL: 3.37 UIU/ML (ref 0.36–3.74)
VLDLC SERPL CALC-MCNC: 26 MG/DL (ref 6–23)
WBC # BLD AUTO: 4.9 K/UL (ref 4.3–11.1)

## 2023-05-18 PROCEDURE — G0439 PPPS, SUBSEQ VISIT: HCPCS | Performed by: NURSE PRACTITIONER

## 2023-05-18 PROCEDURE — 90471 IMMUNIZATION ADMIN: CPT | Performed by: NURSE PRACTITIONER

## 2023-05-18 PROCEDURE — 90715 TDAP VACCINE 7 YRS/> IM: CPT | Performed by: NURSE PRACTITIONER

## 2023-05-18 PROCEDURE — 3075F SYST BP GE 130 - 139MM HG: CPT | Performed by: NURSE PRACTITIONER

## 2023-05-18 PROCEDURE — 1123F ACP DISCUSS/DSCN MKR DOCD: CPT | Performed by: NURSE PRACTITIONER

## 2023-05-18 PROCEDURE — 3079F DIAST BP 80-89 MM HG: CPT | Performed by: NURSE PRACTITIONER

## 2023-05-18 RX ORDER — LOSARTAN POTASSIUM 50 MG/1
50 TABLET ORAL DAILY
Qty: 90 TABLET | Refills: 1 | Status: SHIPPED | OUTPATIENT
Start: 2023-05-18

## 2023-05-18 RX ORDER — MONTELUKAST SODIUM 10 MG/1
10 TABLET ORAL DAILY
Qty: 90 TABLET | Refills: 1 | Status: SHIPPED | OUTPATIENT
Start: 2023-05-18

## 2023-05-18 RX ORDER — ALBUTEROL SULFATE 2.5 MG/3ML
2.5 SOLUTION RESPIRATORY (INHALATION) EVERY 6 HOURS PRN
Qty: 50 EACH | Refills: 1 | Status: SHIPPED | OUTPATIENT
Start: 2023-05-18

## 2023-05-18 RX ORDER — VENLAFAXINE HYDROCHLORIDE 150 MG/1
300 CAPSULE, EXTENDED RELEASE ORAL DAILY
Qty: 180 CAPSULE | Refills: 1 | Status: SHIPPED | OUTPATIENT
Start: 2023-05-18

## 2023-05-18 RX ORDER — LORATADINE 10 MG/1
10 TABLET ORAL DAILY
Qty: 90 TABLET | Refills: 1 | Status: SHIPPED | OUTPATIENT
Start: 2023-05-18

## 2023-05-18 RX ORDER — FLUTICASONE PROPIONATE 50 MCG
2 SPRAY, SUSPENSION (ML) NASAL DAILY
Qty: 16 G | Refills: 2 | Status: SHIPPED | OUTPATIENT
Start: 2023-05-18

## 2023-05-18 RX ORDER — ATENOLOL 50 MG/1
50 TABLET ORAL DAILY
Qty: 90 TABLET | Refills: 1 | Status: SHIPPED | OUTPATIENT
Start: 2023-05-18

## 2023-05-18 SDOH — ECONOMIC STABILITY: FOOD INSECURITY: WITHIN THE PAST 12 MONTHS, YOU WORRIED THAT YOUR FOOD WOULD RUN OUT BEFORE YOU GOT MONEY TO BUY MORE.: SOMETIMES TRUE

## 2023-05-18 SDOH — ECONOMIC STABILITY: HOUSING INSECURITY
IN THE LAST 12 MONTHS, WAS THERE A TIME WHEN YOU DID NOT HAVE A STEADY PLACE TO SLEEP OR SLEPT IN A SHELTER (INCLUDING NOW)?: NO

## 2023-05-18 SDOH — ECONOMIC STABILITY: FOOD INSECURITY: WITHIN THE PAST 12 MONTHS, THE FOOD YOU BOUGHT JUST DIDN'T LAST AND YOU DIDN'T HAVE MONEY TO GET MORE.: SOMETIMES TRUE

## 2023-05-18 SDOH — ECONOMIC STABILITY: INCOME INSECURITY: HOW HARD IS IT FOR YOU TO PAY FOR THE VERY BASICS LIKE FOOD, HOUSING, MEDICAL CARE, AND HEATING?: SOMEWHAT HARD

## 2023-05-18 ASSESSMENT — ENCOUNTER SYMPTOMS
EYES NEGATIVE: 1
RESPIRATORY NEGATIVE: 1
ALLERGIC/IMMUNOLOGIC NEGATIVE: 1
GASTROINTESTINAL NEGATIVE: 1

## 2023-05-18 ASSESSMENT — PATIENT HEALTH QUESTIONNAIRE - PHQ9
9. THOUGHTS THAT YOU WOULD BE BETTER OFF DEAD, OR OF HURTING YOURSELF: 0
SUM OF ALL RESPONSES TO PHQ9 QUESTIONS 1 & 2: 4
SUM OF ALL RESPONSES TO PHQ QUESTIONS 1-9: 16
SUM OF ALL RESPONSES TO PHQ QUESTIONS 1-9: 16
7. TROUBLE CONCENTRATING ON THINGS, SUCH AS READING THE NEWSPAPER OR WATCHING TELEVISION: 2
6. FEELING BAD ABOUT YOURSELF - OR THAT YOU ARE A FAILURE OR HAVE LET YOURSELF OR YOUR FAMILY DOWN: 1
SUM OF ALL RESPONSES TO PHQ QUESTIONS 1-9: 16
4. FEELING TIRED OR HAVING LITTLE ENERGY: 3
10. IF YOU CHECKED OFF ANY PROBLEMS, HOW DIFFICULT HAVE THESE PROBLEMS MADE IT FOR YOU TO DO YOUR WORK, TAKE CARE OF THINGS AT HOME, OR GET ALONG WITH OTHER PEOPLE: 2
8. MOVING OR SPEAKING SO SLOWLY THAT OTHER PEOPLE COULD HAVE NOTICED. OR THE OPPOSITE, BEING SO FIGETY OR RESTLESS THAT YOU HAVE BEEN MOVING AROUND A LOT MORE THAN USUAL: 2
3. TROUBLE FALLING OR STAYING ASLEEP: 3
2. FEELING DOWN, DEPRESSED OR HOPELESS: 1
SUM OF ALL RESPONSES TO PHQ QUESTIONS 1-9: 16
1. LITTLE INTEREST OR PLEASURE IN DOING THINGS: 3
5. POOR APPETITE OR OVEREATING: 1

## 2023-05-18 ASSESSMENT — LIFESTYLE VARIABLES
HOW MANY STANDARD DRINKS CONTAINING ALCOHOL DO YOU HAVE ON A TYPICAL DAY: PATIENT DOES NOT DRINK
HOW OFTEN DO YOU HAVE A DRINK CONTAINING ALCOHOL: NEVER

## 2023-05-18 NOTE — PATIENT INSTRUCTIONS
fewer medicines. No matter how old you are, how fit you are, or what health problems you have, there is a form of activity that will work for you. And the more physical activity you can do, the better your overall health will be. What kinds of activity can help you stay healthy? Being more active will make your daily activities easier. Physical activity includes planned exercise and things you do in daily life. There are four types of activity:  Aerobic. Doing aerobic activity makes your heart and lungs strong. Includes walking, dancing, and gardening. Aim for at least 2½ hours spread throughout the week. It improves your energy and can help you sleep better. Muscle-strengthening. This type of activity can help maintain muscle and strengthen bones. Includes climbing stairs, using resistance bands, and lifting or carrying heavy loads. Aim for at least twice a week. It can help protect the knees and other joints. Stretching. Stretching gives you better range of motion in joints and muscles. Includes upper arm stretches, calf stretches, and gentle yoga. Aim for at least twice a week, preferably after your muscles are warmed up from other activities. It can help you function better in daily life. Balancing. This helps you stay coordinated and have good posture. Includes heel-to-toe walking, ron chi, and certain types of yoga. Aim for at least 3 days a week. It can reduce your risk of falling. Even if you have a hard time meeting the recommendations, it's better to be more active than less active. All activity done in each category counts toward your weekly total. You'd be surprised how daily things like carrying groceries, keeping up with grandchildren, and taking the stairs can add up. What keeps you from being active? If you've had a hard time being more active, you're not alone. Maybe you remember being able to do more. Or maybe you've never thought of yourself as being active.  It's

## 2023-05-18 NOTE — PROGRESS NOTES
[address]  [address] [place] [address]  [place], [address] [facility]   (ph) [phone] (fax) [phone]  Lolis CHAPPELL, FNP-C      Chief Complaint   Patient presents with    Medicare AWV    Other     Pt would like print out on her AVS on a gluten free diet       79 year-old white female comes into the office today to follow-up on chronic conditions. She is followed for hypertension,  GERD, anxiety, and depression. She did not see Dr. Supriya Lujan in February. They called her about three times and changed the appt. She became frustrated and said forget it. She is thinking about trying to schedule again with Vergil Fortune. She sees her daughter and grandchildren much more and is in good spirits today. Colonoscopy was in July 2021, repeat in 2026. She reports morning headaches have decreased. Still reports occasional fatigue. She reports a hx of mild sleep apnea but has gained some weight over the past several months. She went  for the sleep study and is now back on the CPAP. She also needs her AWV. Other  Associated symptoms include fatigue.      Allergies   Allergen Reactions    Latex Other (See Comments)     Burns; blisters    Cephalexin Hives    Lisinopril Other (See Comments)    Penicillins Hives    Sulfa Antibiotics Hives       Past Medical History:   Diagnosis Date    Adverse effect of anesthesia     hard to wake up    Allergic rhinitis     Asthma     Autoimmune disease ([address]     fibromyalgia    DDD (degenerative disc disease), lumbar     Depression     Diabetes ([address]     pre-diabetes    Diverticulitis 2016    diagnosed at Colonoscopy    Fibromyalgia     Fibromyalgia     Fracture 2016    right foot    GERD (gastroesophageal reflux disease)     Herpes genitalis in women     dormant    Hypertension     IBS (irritable bowel syndrome)     Morbid obesity ([address]     Primary hypothyroidism     PUD (peptic ulcer disease)     Sleep apnea     had 2 studies    Status post right knee

## 2023-05-25 ENCOUNTER — TRANSCRIBE ORDERS (OUTPATIENT)
Dept: SCHEDULING | Age: 68
End: 2023-05-25

## 2023-05-25 DIAGNOSIS — Z12.31 SCREENING MAMMOGRAM FOR HIGH-RISK PATIENT: Primary | ICD-10-CM

## 2023-05-30 ENCOUNTER — HOSPITAL ENCOUNTER (OUTPATIENT)
Dept: NUTRITION | Age: 68
Discharge: HOME | End: 2023-06-02

## 2023-05-30 PROCEDURE — 97802 MEDICAL NUTRITION INDIV IN: CPT

## 2023-05-31 NOTE — PROGRESS NOTES
[address], [address] [place] [address],   Outpatient Registered Dietitian  [facility]  Phone: [phone]  Fax: [phone]    Betzy Paul is a 79 y.o. female referred with the following diagnosis (es): Morbid (severe) obesity due to excess calories [E66.01]  Body mass index (BMI) 45.0-49.9, adult [Z68.42]     ASSESSMENT  PMH includes HTN, GERD, hx PUD, diverticulosis, MURRAY, IBS, HAIR w/CPAP  Labs: 5/18/23 Total Chol 186 TRIG 130 HDL 81 LDL 79 vit D 26.4 (L) Alk Phos 139 (H)   Meds: atenolol, losartan    Patient stated goal: figure out what I can eat     Patient here to see RD to figure out what she can eat with her GI diagnoses of PUD, diverticulosis, MURRAY, and IBS. She has gotten advice from family/friends and advice is conflicting. Foods she avoids include: tomato sauces, stewed cabbage, hot dog chili, [place] food, spicy foods. Mentions avoiding nuts d/t diverticulitis. She used to take metamucil, encouraged her to start again. She's noticed she eats smaller portions due to feeling bloated and full, but still experiencing weight gain. Reports frequent constipation to diarrhea, stools are usually loose. More rare to have normal BM. Factors impacting food choices:   -Established food preferences/eating behaviors  -Cooking for one    Initial Diet Recall:  B: 2 toaster strudel lowry egg and cheese, coffee with cream   L: sandwich on white bread with ham and tomato   D: can of beef stew over rice  Snack: cocoa crispies cereal with 1% milk     Beverages: 2, 16oz bottles of water daily, diet soda     Eating pattern appears excessive in refined carbohydrate, and inadequate in fiber, fruit, and vegetables. Lifestyle/Family Influence/Support: -lives alone   -does not cook much since living alone      Movement includes none currently. Stage of Change: Preparation.     Anthropometrics:   Estimated body mass index is 49.79 kg/m² as calculated from the following:    Height as of

## 2023-06-15 ENCOUNTER — HOSPITAL ENCOUNTER (OUTPATIENT)
Dept: MAMMOGRAPHY | Age: 68
Discharge: HOME | End: 2023-06-18
Payer: MEDICARE

## 2023-06-15 DIAGNOSIS — Z12.31 SCREENING MAMMOGRAM FOR HIGH-RISK PATIENT: ICD-10-CM

## 2023-06-15 DIAGNOSIS — Z78.0 POSTMENOPAUSE: ICD-10-CM

## 2023-06-15 PROCEDURE — 77067 SCR MAMMO BI INCL CAD: CPT

## 2023-06-15 PROCEDURE — 77080 DXA BONE DENSITY AXIAL: CPT

## 2023-06-20 NOTE — RESULT ENCOUNTER NOTE
Using the World Health Organization criteria, the bone mineral  density is osteopenia. Is she taking the calcium and Vitamin d daily? How much?   Thanks

## 2023-06-20 NOTE — RESULT ENCOUNTER NOTE
IMPRESSION:  No mammographic evidence of malignancy.       Recommend mammogram in one year.       BI-RADS Assessment Category 1: Negative.   Thanks

## 2023-06-26 ENCOUNTER — TELEPHONE (OUTPATIENT)
Dept: FAMILY MEDICINE CLINIC | Facility: CLINIC | Age: 68
End: 2023-06-26

## 2023-06-26 DIAGNOSIS — E03.9 HYPOTHYROIDISM, UNSPECIFIED TYPE: ICD-10-CM

## 2023-06-27 RX ORDER — LEVOTHYROXINE SODIUM 0.1 MG/1
100 TABLET ORAL
Qty: 90 TABLET | Refills: 0 | Status: SHIPPED | OUTPATIENT
Start: 2023-06-27

## 2023-06-30 DIAGNOSIS — F32.A DEPRESSION, UNSPECIFIED DEPRESSION TYPE: ICD-10-CM

## 2023-07-03 RX ORDER — VENLAFAXINE HYDROCHLORIDE 150 MG/1
CAPSULE, EXTENDED RELEASE ORAL
Qty: 90 CAPSULE | Refills: 1 | OUTPATIENT
Start: 2023-07-03

## 2023-07-24 DIAGNOSIS — F32.A DEPRESSION, UNSPECIFIED DEPRESSION TYPE: ICD-10-CM

## 2023-07-24 RX ORDER — VENLAFAXINE HYDROCHLORIDE 150 MG/1
CAPSULE, EXTENDED RELEASE ORAL
Qty: 90 CAPSULE | Refills: 1 | OUTPATIENT
Start: 2023-07-24

## 2023-08-03 ENCOUNTER — OFFICE VISIT (OUTPATIENT)
Dept: FAMILY MEDICINE CLINIC | Facility: CLINIC | Age: 68
End: 2023-08-03
Payer: MEDICARE

## 2023-08-03 VITALS
OXYGEN SATURATION: 95 % | HEIGHT: 62 IN | HEART RATE: 81 BPM | DIASTOLIC BLOOD PRESSURE: 84 MMHG | TEMPERATURE: 97.6 F | SYSTOLIC BLOOD PRESSURE: 128 MMHG | WEIGHT: 272.4 LBS | BODY MASS INDEX: 50.13 KG/M2

## 2023-08-03 DIAGNOSIS — R10.32 LEFT LOWER QUADRANT ABDOMINAL PAIN: ICD-10-CM

## 2023-08-03 DIAGNOSIS — R10.32 LEFT LOWER QUADRANT ABDOMINAL PAIN: Primary | ICD-10-CM

## 2023-08-03 DIAGNOSIS — K43.9 VENTRAL HERNIA WITHOUT OBSTRUCTION OR GANGRENE: ICD-10-CM

## 2023-08-03 DIAGNOSIS — R11.2 NAUSEA AND VOMITING, UNSPECIFIED VOMITING TYPE: ICD-10-CM

## 2023-08-03 LAB
ALBUMIN SERPL-MCNC: 3.8 G/DL (ref 3.2–4.6)
ALBUMIN/GLOB SERPL: 1.1 (ref 0.4–1.6)
ALP SERPL-CCNC: 145 U/L (ref 50–136)
ALT SERPL-CCNC: 30 U/L (ref 12–65)
ANION GAP SERPL CALC-SCNC: 9 MMOL/L (ref 2–11)
AST SERPL-CCNC: 17 U/L (ref 15–37)
BASOPHILS # BLD: 0 K/UL (ref 0–0.2)
BASOPHILS NFR BLD: 0 % (ref 0–2)
BILIRUB SERPL-MCNC: 0.2 MG/DL (ref 0.2–1.1)
BILIRUBIN, URINE, POC: NEGATIVE
BLOOD URINE, POC: NEGATIVE
BUN SERPL-MCNC: 19 MG/DL (ref 8–23)
CALCIUM SERPL-MCNC: 9.1 MG/DL (ref 8.3–10.4)
CHLORIDE SERPL-SCNC: 109 MMOL/L (ref 101–110)
CO2 SERPL-SCNC: 27 MMOL/L (ref 21–32)
CREAT SERPL-MCNC: 0.8 MG/DL (ref 0.6–1)
DIFFERENTIAL METHOD BLD: ABNORMAL
EOSINOPHIL # BLD: 0 K/UL (ref 0–0.8)
EOSINOPHIL NFR BLD: 0 % (ref 0.5–7.8)
ERYTHROCYTE [DISTWIDTH] IN BLOOD BY AUTOMATED COUNT: 13.7 % (ref 11.9–14.6)
GLOBULIN SER CALC-MCNC: 3.4 G/DL (ref 2.8–4.5)
GLUCOSE SERPL-MCNC: 109 MG/DL (ref 65–100)
GLUCOSE URINE, POC: NEGATIVE
HCT VFR BLD AUTO: 40.2 % (ref 35.8–46.3)
HGB BLD-MCNC: 12.7 G/DL (ref 11.7–15.4)
IMM GRANULOCYTES # BLD AUTO: 0 K/UL (ref 0–0.5)
IMM GRANULOCYTES NFR BLD AUTO: 0 % (ref 0–5)
KETONES, URINE, POC: NEGATIVE
LEUKOCYTE ESTERASE, URINE, POC: NEGATIVE
LYMPHOCYTES # BLD: 1.1 K/UL (ref 0.5–4.6)
LYMPHOCYTES NFR BLD: 21 % (ref 13–44)
MCH RBC QN AUTO: 28.9 PG (ref 26.1–32.9)
MCHC RBC AUTO-ENTMCNC: 31.6 G/DL (ref 31.4–35)
MCV RBC AUTO: 91.6 FL (ref 82–102)
MONOCYTES # BLD: 0.5 K/UL (ref 0.1–1.3)
MONOCYTES NFR BLD: 9 % (ref 4–12)
NEUTS SEG # BLD: 3.8 K/UL (ref 1.7–8.2)
NEUTS SEG NFR BLD: 70 % (ref 43–78)
NITRITE, URINE, POC: NEGATIVE
NRBC # BLD: 0 K/UL (ref 0–0.2)
PH, URINE, POC: 6.5 (ref 4.6–8)
PLATELET # BLD AUTO: 259 K/UL (ref 150–450)
PMV BLD AUTO: 10.6 FL (ref 9.4–12.3)
POTASSIUM SERPL-SCNC: 4.5 MMOL/L (ref 3.5–5.1)
PROT SERPL-MCNC: 7.2 G/DL (ref 6.3–8.2)
PROTEIN,URINE, POC: NORMAL
RBC # BLD AUTO: 4.39 M/UL (ref 4.05–5.2)
SODIUM SERPL-SCNC: 145 MMOL/L (ref 133–143)
SPECIFIC GRAVITY, URINE, POC: 1.03 (ref 1–1.03)
URINALYSIS CLARITY, POC: CLEAR
URINALYSIS COLOR, POC: NORMAL
UROBILINOGEN, POC: NORMAL
WBC # BLD AUTO: 5.4 K/UL (ref 4.3–11.1)

## 2023-08-03 PROCEDURE — 1123F ACP DISCUSS/DSCN MKR DOCD: CPT | Performed by: NURSE PRACTITIONER

## 2023-08-03 PROCEDURE — 99214 OFFICE O/P EST MOD 30 MIN: CPT | Performed by: NURSE PRACTITIONER

## 2023-08-03 PROCEDURE — 81003 URINALYSIS AUTO W/O SCOPE: CPT | Performed by: NURSE PRACTITIONER

## 2023-08-03 PROCEDURE — 3074F SYST BP LT 130 MM HG: CPT | Performed by: NURSE PRACTITIONER

## 2023-08-03 PROCEDURE — 3079F DIAST BP 80-89 MM HG: CPT | Performed by: NURSE PRACTITIONER

## 2023-08-03 RX ORDER — ONDANSETRON 4 MG/1
4 TABLET, FILM COATED ORAL 3 TIMES DAILY PRN
Qty: 30 TABLET | Refills: 0 | Status: SHIPPED | OUTPATIENT
Start: 2023-08-03

## 2023-08-03 ASSESSMENT — PATIENT HEALTH QUESTIONNAIRE - PHQ9
3. TROUBLE FALLING OR STAYING ASLEEP: 3
SUM OF ALL RESPONSES TO PHQ QUESTIONS 1-9: 10
7. TROUBLE CONCENTRATING ON THINGS, SUCH AS READING THE NEWSPAPER OR WATCHING TELEVISION: 1
10. IF YOU CHECKED OFF ANY PROBLEMS, HOW DIFFICULT HAVE THESE PROBLEMS MADE IT FOR YOU TO DO YOUR WORK, TAKE CARE OF THINGS AT HOME, OR GET ALONG WITH OTHER PEOPLE: 1
5. POOR APPETITE OR OVEREATING: 0
SUM OF ALL RESPONSES TO PHQ QUESTIONS 1-9: 10
SUM OF ALL RESPONSES TO PHQ QUESTIONS 1-9: 10
1. LITTLE INTEREST OR PLEASURE IN DOING THINGS: 1
2. FEELING DOWN, DEPRESSED OR HOPELESS: 2
6. FEELING BAD ABOUT YOURSELF - OR THAT YOU ARE A FAILURE OR HAVE LET YOURSELF OR YOUR FAMILY DOWN: 0
9. THOUGHTS THAT YOU WOULD BE BETTER OFF DEAD, OR OF HURTING YOURSELF: 0
4. FEELING TIRED OR HAVING LITTLE ENERGY: 3
8. MOVING OR SPEAKING SO SLOWLY THAT OTHER PEOPLE COULD HAVE NOTICED. OR THE OPPOSITE, BEING SO FIGETY OR RESTLESS THAT YOU HAVE BEEN MOVING AROUND A LOT MORE THAN USUAL: 0
SUM OF ALL RESPONSES TO PHQ QUESTIONS 1-9: 10
SUM OF ALL RESPONSES TO PHQ9 QUESTIONS 1 & 2: 3

## 2023-08-03 ASSESSMENT — ENCOUNTER SYMPTOMS
BLOOD IN STOOL: 0
CONSTIPATION: 1
ABDOMINAL PAIN: 1
VOMITING: 0
DIARRHEA: 1
NAUSEA: 0

## 2023-08-03 NOTE — PROGRESS NOTES
Kolby Almaraz is a 76 y.o. female who presents today for the following:  Chief Complaint   Patient presents with    Follow-up    Abdominal Pain    Hernia         Allergies   Allergen Reactions    Latex Other (See Comments)     Burns; blisters    Cephalexin Hives    Lisinopril Other (See Comments)    Penicillins Hives    Sulfa Antibiotics Hives       Current Outpatient Medications   Medication Sig Dispense Refill    ondansetron (ZOFRAN) 4 MG tablet Take 1 tablet by mouth 3 times daily as needed for Nausea or Vomiting 30 tablet 0    levothyroxine (SYNTHROID) 100 MCG tablet Take 1 tablet by mouth every morning (before breakfast) 90 tablet 0    atenolol (TENORMIN) 50 MG tablet Take 1 tablet by mouth daily 90 tablet 1    fluticasone (FLONASE) 50 MCG/ACT nasal spray 2 sprays by Nasal route daily 16 g 2    losartan (COZAAR) 50 MG tablet Take 1 tablet by mouth daily 90 tablet 1    venlafaxine (EFFEXOR XR) 150 MG extended release capsule Take 2 capsules by mouth daily 180 capsule 1    albuterol (PROVENTIL) (2.5 MG/3ML) 0.083% nebulizer solution Take 3 mLs by nebulization every 6 hours as needed for Wheezing 50 each 1    loratadine (CLARITIN) 10 MG tablet Take 1 tablet by mouth daily 90 tablet 1    montelukast (SINGULAIR) 10 MG tablet Take 1 tablet by mouth daily 90 tablet 1    fluticasone (CUTIVATE) 0.005 % ointment APPLY TO AFFECTED AREAS TWICE DAILY X2 WEEKS FOR FLARES      albuterol sulfate HFA (PROVENTIL;VENTOLIN;PROAIR) 108 (90 Base) MCG/ACT inhaler Inhale 2 puffs into the lungs every 6 hours as needed for Wheezing 18 g 1    Cholecalciferol 50 MCG (2000 UT) CAPS Take 2,000 Units by mouth daily 90 capsule 1     No current facility-administered medications for this visit.        Past Medical History:   Diagnosis Date    Adverse effect of anesthesia     hard to wake up    Allergic rhinitis     Asthma     Autoimmune disease ([address])     fibromyalgia    DDD (degenerative disc disease), lumbar     Depression

## 2023-08-03 NOTE — RESULT ENCOUNTER NOTE
Ok?   Urine showed some protein in her urine, she will need to have this monitored with [facility]olis. Please have her sign a release for medical records from where she was previously treated for diverticulitis regarding her prior treatment for diverticulitis. I looked through [address] and only see the GI note regarding the diverticulosis. I don't see where she was previously diagnosed with diverticulitis. Thanks. Please let me know if patient has any further questions or concerns.

## 2023-08-04 NOTE — RESULT ENCOUNTER NOTE
Labs look good. Will await results of CT scan. Continue liquid diet if having some pain. Please let me know if pt has any additional questions. Keep scheduled appointment.

## 2023-08-17 ENCOUNTER — APPOINTMENT (OUTPATIENT)
Dept: CT IMAGING | Age: 68
End: 2023-08-17
Payer: MEDICARE

## 2023-08-22 ENCOUNTER — APPOINTMENT (OUTPATIENT)
Dept: CT IMAGING | Age: 68
End: 2023-08-22
Payer: MEDICARE

## 2023-08-31 ENCOUNTER — TELEPHONE (OUTPATIENT)
Dept: FAMILY MEDICINE CLINIC | Facility: CLINIC | Age: 68
End: 2023-08-31

## 2023-08-31 NOTE — TELEPHONE ENCOUNTER
Called scheduling to follow up on this. They provided me with number to the [facility] department. Called & spoke to Navid, who states that it was approved on 8/17/23. Confirmation # [id number]. Authorization expires 11/12/23. Called patient, notifying her of above. Provider her with auth # & the phone # to call & schedule imaging. She verbalized understanding & thanked.

## 2023-08-31 NOTE — TELEPHONE ENCOUNTER
Patient called and would like to know if we have any information why radiology called her and states her insurance will not pay for the CT Please advise

## 2023-09-20 ENCOUNTER — HOSPITAL ENCOUNTER (OUTPATIENT)
Dept: CT IMAGING | Age: 68
Discharge: HOME | End: 2023-09-23
Payer: MEDICARE

## 2023-09-20 ENCOUNTER — TELEPHONE (OUTPATIENT)
Dept: FAMILY MEDICINE CLINIC | Facility: CLINIC | Age: 68
End: 2023-09-20

## 2023-09-20 DIAGNOSIS — R10.32 LEFT LOWER QUADRANT ABDOMINAL PAIN: ICD-10-CM

## 2023-09-20 DIAGNOSIS — K43.9 VENTRAL HERNIA WITHOUT OBSTRUCTION OR GANGRENE: ICD-10-CM

## 2023-09-20 DIAGNOSIS — K42.9 UMBILICAL HERNIA WITHOUT OBSTRUCTION AND WITHOUT GANGRENE: Primary | ICD-10-CM

## 2023-09-20 DIAGNOSIS — R93.5 ABNORMAL COMPUTED TOMOGRAPHY OF ABDOMEN AND PELVIS: ICD-10-CM

## 2023-09-20 LAB — CREAT BLD-MCNC: 0.74 MG/DL (ref 0.8–1.5)

## 2023-09-20 PROCEDURE — 2580000003 HC RX 258: Performed by: NURSE PRACTITIONER

## 2023-09-20 PROCEDURE — 6360000004 HC RX CONTRAST MEDICATION: Performed by: NURSE PRACTITIONER

## 2023-09-20 PROCEDURE — 82565 ASSAY OF CREATININE: CPT

## 2023-09-20 PROCEDURE — 74177 CT ABD & PELVIS W/CONTRAST: CPT

## 2023-09-20 RX ORDER — SODIUM CHLORIDE 0.9 % (FLUSH) 0.9 %
10 SYRINGE (ML) INJECTION
Status: COMPLETED | OUTPATIENT
Start: 2023-09-20 | End: 2023-09-20

## 2023-09-20 RX ORDER — 0.9 % SODIUM CHLORIDE 0.9 %
100 INTRAVENOUS SOLUTION INTRAVENOUS ONCE
Status: COMPLETED | OUTPATIENT
Start: 2023-09-20 | End: 2023-09-20

## 2023-09-20 RX ADMIN — IOPAMIDOL 100 ML: 755 INJECTION, SOLUTION INTRAVENOUS at 11:10

## 2023-09-20 RX ADMIN — SODIUM CHLORIDE, PRESERVATIVE FREE 10 ML: 5 INJECTION INTRAVENOUS at 11:10

## 2023-09-20 RX ADMIN — DIATRIZOATE MEGLUMINE AND DIATRIZOATE SODIUM 15 ML: 660; 100 LIQUID ORAL; RECTAL at 11:10

## 2023-09-20 RX ADMIN — SODIUM CHLORIDE 100 ML: 9 INJECTION, SOLUTION INTRAVENOUS at 11:10

## 2023-09-20 NOTE — TELEPHONE ENCOUNTER
Discussed with pt CT results:  liver cyst, hiatal hernia, and umbilical hernia containing the transverse colon. Pt is eating softer foods and having Bms avoiding constipation. Voices understanding ED if severe symptoms or persistent abdominal pain, N/V, or worsening. Discussed surgical referral sent.

## 2023-09-20 NOTE — RESULT ENCOUNTER NOTE
Patient's imaging result showed:  umbilical hernia containing transverse colon. She does have left liver lobe cysts that measure 2.5 cm in diameter. I attempted to call her and left message for her to call the office with results. Please attempt to call her 09/21/23 to give her results and let her know I referred her to surgery. If patient has any additional questions or concerns, please let me know.

## 2023-09-23 DIAGNOSIS — E03.9 HYPOTHYROIDISM, UNSPECIFIED TYPE: ICD-10-CM

## 2023-09-25 DIAGNOSIS — E03.9 HYPOTHYROIDISM, UNSPECIFIED TYPE: ICD-10-CM

## 2023-09-25 RX ORDER — LEVOTHYROXINE SODIUM 0.1 MG/1
100 TABLET ORAL
Qty: 90 TABLET | Refills: 0 | Status: SHIPPED | OUTPATIENT
Start: 2023-09-25 | End: 2023-11-16 | Stop reason: SDUPTHER

## 2023-09-26 RX ORDER — LEVOTHYROXINE SODIUM 0.1 MG/1
100 TABLET ORAL
Qty: 90 TABLET | Refills: 0 | OUTPATIENT
Start: 2023-09-26

## 2023-09-27 ENCOUNTER — OFFICE VISIT (OUTPATIENT)
Dept: SURGERY | Age: 68
End: 2023-09-27

## 2023-09-27 VITALS
OXYGEN SATURATION: 98 % | WEIGHT: 268 LBS | DIASTOLIC BLOOD PRESSURE: 88 MMHG | BODY MASS INDEX: 49.32 KG/M2 | SYSTOLIC BLOOD PRESSURE: 158 MMHG | HEIGHT: 62 IN | HEART RATE: 108 BPM

## 2023-09-27 DIAGNOSIS — K43.0 RECURRENT VENTRAL HERNIA WITH INCARCERATION: ICD-10-CM

## 2023-09-27 DIAGNOSIS — E66.01 MORBID OBESITY WITH BMI OF 45.0-49.9, ADULT (HCC): Primary | ICD-10-CM

## 2023-09-27 RX ORDER — IBUPROFEN 600 MG/1
600 TABLET ORAL 3 TIMES DAILY PRN
Qty: 30 TABLET | Refills: 0 | Status: SHIPPED | OUTPATIENT
Start: 2023-09-27

## 2023-09-27 RX ORDER — ACETAMINOPHEN 500 MG
500 TABLET ORAL 4 TIMES DAILY PRN
Qty: 360 TABLET | Refills: 1 | Status: SHIPPED | OUTPATIENT
Start: 2023-09-27

## 2023-09-27 RX ORDER — FAMOTIDINE 20 MG/1
20 TABLET, FILM COATED ORAL 2 TIMES DAILY
Qty: 60 TABLET | Refills: 3 | Status: SHIPPED | OUTPATIENT
Start: 2023-09-27

## 2023-10-12 DIAGNOSIS — E03.9 HYPOTHYROIDISM, UNSPECIFIED TYPE: ICD-10-CM

## 2023-10-12 RX ORDER — LEVOTHYROXINE SODIUM 88 UG/1
88 TABLET ORAL
Qty: 90 TABLET | Refills: 1 | OUTPATIENT
Start: 2023-10-12

## 2023-10-26 ENCOUNTER — TELEMEDICINE (OUTPATIENT)
Dept: FAMILY MEDICINE CLINIC | Facility: CLINIC | Age: 68
End: 2023-10-26
Payer: MEDICARE

## 2023-10-26 DIAGNOSIS — J01.90 ACUTE NON-RECURRENT SINUSITIS, UNSPECIFIED LOCATION: Primary | ICD-10-CM

## 2023-10-26 PROCEDURE — 99442 PR PHYS/QHP TELEPHONE EVALUATION 11-20 MIN: CPT | Performed by: NURSE PRACTITIONER

## 2023-10-26 RX ORDER — AZITHROMYCIN 250 MG/1
250 TABLET, FILM COATED ORAL SEE ADMIN INSTRUCTIONS
Qty: 6 TABLET | Refills: 0 | Status: SHIPPED | OUTPATIENT
Start: 2023-10-26 | End: 2023-10-31

## 2023-10-26 ASSESSMENT — ENCOUNTER SYMPTOMS
SINUS PRESSURE: 1
ALLERGIC/IMMUNOLOGIC NEGATIVE: 1
EYES NEGATIVE: 1
SINUS PAIN: 1
RESPIRATORY NEGATIVE: 1
GASTROINTESTINAL NEGATIVE: 1

## 2023-10-26 ASSESSMENT — PATIENT HEALTH QUESTIONNAIRE - PHQ9
9. THOUGHTS THAT YOU WOULD BE BETTER OFF DEAD, OR OF HURTING YOURSELF: 0
2. FEELING DOWN, DEPRESSED OR HOPELESS: 1
3. TROUBLE FALLING OR STAYING ASLEEP: 3
SUM OF ALL RESPONSES TO PHQ9 QUESTIONS 1 & 2: 2
5. POOR APPETITE OR OVEREATING: 0
SUM OF ALL RESPONSES TO PHQ QUESTIONS 1-9: 9
7. TROUBLE CONCENTRATING ON THINGS, SUCH AS READING THE NEWSPAPER OR WATCHING TELEVISION: 1
SUM OF ALL RESPONSES TO PHQ QUESTIONS 1-9: 9
SUM OF ALL RESPONSES TO PHQ QUESTIONS 1-9: 9
6. FEELING BAD ABOUT YOURSELF - OR THAT YOU ARE A FAILURE OR HAVE LET YOURSELF OR YOUR FAMILY DOWN: 0
8. MOVING OR SPEAKING SO SLOWLY THAT OTHER PEOPLE COULD HAVE NOTICED. OR THE OPPOSITE, BEING SO FIGETY OR RESTLESS THAT YOU HAVE BEEN MOVING AROUND A LOT MORE THAN USUAL: 0
1. LITTLE INTEREST OR PLEASURE IN DOING THINGS: 1
SUM OF ALL RESPONSES TO PHQ QUESTIONS 1-9: 9
10. IF YOU CHECKED OFF ANY PROBLEMS, HOW DIFFICULT HAVE THESE PROBLEMS MADE IT FOR YOU TO DO YOUR WORK, TAKE CARE OF THINGS AT HOME, OR GET ALONG WITH OTHER PEOPLE: 1
4. FEELING TIRED OR HAVING LITTLE ENERGY: 3

## 2023-10-26 NOTE — PROGRESS NOTES
[facility]  [address] [facility], [address] [place] [address]   () [phone] (fax) [phone]  Lolis KENNEYARON Ryan-YOHAN        77 yo female c/o cold, sinus drainage, and cough. Reports a low grade fever off and on. Denies Covid exposure. Reports she has had it for two weeks now. She feels that it is most likely a sinus infection with sinus headache. Denies N/V or diarrhea. Has not affected her appetite. Reports teeth also hurt.          Allergies   Allergen Reactions    Latex Other (See Comments)     Burns; blisters    Cephalexin Hives    Lisinopril Other (See Comments)    Penicillins Hives    Sulfa Antibiotics Hives       Past Medical History:   Diagnosis Date    Adverse effect of anesthesia     hard to wake up    Allergic rhinitis     Asthma     Autoimmune disease ([address])     fibromyalgia    DDD (degenerative disc disease), lumbar     Depression     Diabetes ([address])     pre-diabetes    Diverticulitis 2016    diagnosed at Colonoscopy    Fibromyalgia     Fibromyalgia     Fracture 2016    right foot    GERD (gastroesophageal reflux disease)     Herpes genitalis in women     dormant    Hypertension     IBS (irritable bowel syndrome)     Morbid obesity ([address])     Primary hypothyroidism     PUD (peptic ulcer disease)     Sleep apnea     had 2 studies    Status post right knee replacement 7/25/2018       Family History   Problem Relation Age of Onset    Thyroid Disease Daughter         Hypothyroidism    Breast Cancer Neg Hx     Diabetes Sister     Cancer Mother         Lung    GERD Father     Hypertension Father     COPD Father        Social History     Socioeconomic History    Marital status:      Spouse name: Not on file    Number of children: Not on file    Years of education: Not on file    Highest education level: Not on file   Occupational History    Not on file   Tobacco Use    Smoking status: Never    Smokeless tobacco: Never   Substance and Sexual Activity

## 2023-11-16 ENCOUNTER — OFFICE VISIT (OUTPATIENT)
Dept: FAMILY MEDICINE CLINIC | Facility: CLINIC | Age: 68
End: 2023-11-16
Payer: MEDICARE

## 2023-11-16 VITALS
BODY MASS INDEX: 49.43 KG/M2 | DIASTOLIC BLOOD PRESSURE: 88 MMHG | WEIGHT: 268.6 LBS | SYSTOLIC BLOOD PRESSURE: 130 MMHG | OXYGEN SATURATION: 97 % | TEMPERATURE: 98.2 F | HEART RATE: 64 BPM | HEIGHT: 62 IN

## 2023-11-16 DIAGNOSIS — E78.2 MIXED HYPERLIPIDEMIA: ICD-10-CM

## 2023-11-16 DIAGNOSIS — I10 PRIMARY HYPERTENSION: ICD-10-CM

## 2023-11-16 DIAGNOSIS — E03.9 HYPOTHYROIDISM, UNSPECIFIED TYPE: ICD-10-CM

## 2023-11-16 DIAGNOSIS — E55.9 VITAMIN D DEFICIENCY: ICD-10-CM

## 2023-11-16 DIAGNOSIS — J30.1 ALLERGIC RHINITIS DUE TO POLLEN, UNSPECIFIED SEASONALITY: ICD-10-CM

## 2023-11-16 DIAGNOSIS — K21.9 GASTROESOPHAGEAL REFLUX DISEASE WITHOUT ESOPHAGITIS: ICD-10-CM

## 2023-11-16 DIAGNOSIS — R73.09 ELEVATED GLUCOSE: ICD-10-CM

## 2023-11-16 DIAGNOSIS — J45.909 MILD ASTHMA WITHOUT COMPLICATION, UNSPECIFIED WHETHER PERSISTENT: ICD-10-CM

## 2023-11-16 DIAGNOSIS — K42.9 UMBILICAL HERNIA WITHOUT OBSTRUCTION AND WITHOUT GANGRENE: ICD-10-CM

## 2023-11-16 DIAGNOSIS — E55.9 VITAMIN D DEFICIENCY: Primary | ICD-10-CM

## 2023-11-16 DIAGNOSIS — F32.A DEPRESSION, UNSPECIFIED DEPRESSION TYPE: ICD-10-CM

## 2023-11-16 DIAGNOSIS — I10 PRIMARY HYPERTENSION: Primary | ICD-10-CM

## 2023-11-16 LAB
BASOPHILS # BLD: 0 K/UL (ref 0–0.2)
BASOPHILS NFR BLD: 0 % (ref 0–2)
DIFFERENTIAL METHOD BLD: ABNORMAL
EOSINOPHIL # BLD: 0 K/UL (ref 0–0.8)
EOSINOPHIL NFR BLD: 0 % (ref 0.5–7.8)
ERYTHROCYTE [DISTWIDTH] IN BLOOD BY AUTOMATED COUNT: 14.2 % (ref 11.9–14.6)
HBA1C MFR BLD: 5.5 %
HCT VFR BLD AUTO: 39.6 % (ref 35.8–46.3)
HGB BLD-MCNC: 12.7 G/DL (ref 11.7–15.4)
IMM GRANULOCYTES # BLD AUTO: 0 K/UL (ref 0–0.5)
IMM GRANULOCYTES NFR BLD AUTO: 0 % (ref 0–5)
LYMPHOCYTES # BLD: 1 K/UL (ref 0.5–4.6)
LYMPHOCYTES NFR BLD: 23 % (ref 13–44)
MCH RBC QN AUTO: 29.1 PG (ref 26.1–32.9)
MCHC RBC AUTO-ENTMCNC: 32.1 G/DL (ref 31.4–35)
MCV RBC AUTO: 90.6 FL (ref 82–102)
MONOCYTES # BLD: 0.4 K/UL (ref 0.1–1.3)
MONOCYTES NFR BLD: 9 % (ref 4–12)
NEUTS SEG # BLD: 3 K/UL (ref 1.7–8.2)
NEUTS SEG NFR BLD: 68 % (ref 43–78)
NRBC # BLD: 0 K/UL (ref 0–0.2)
PLATELET # BLD AUTO: 258 K/UL (ref 150–450)
PMV BLD AUTO: 10.5 FL (ref 9.4–12.3)
RBC # BLD AUTO: 4.37 M/UL (ref 4.05–5.2)
WBC # BLD AUTO: 4.4 K/UL (ref 4.3–11.1)

## 2023-11-16 PROCEDURE — 3079F DIAST BP 80-89 MM HG: CPT | Performed by: NURSE PRACTITIONER

## 2023-11-16 PROCEDURE — 99214 OFFICE O/P EST MOD 30 MIN: CPT | Performed by: NURSE PRACTITIONER

## 2023-11-16 PROCEDURE — 83036 HEMOGLOBIN GLYCOSYLATED A1C: CPT | Performed by: NURSE PRACTITIONER

## 2023-11-16 PROCEDURE — 3075F SYST BP GE 130 - 139MM HG: CPT | Performed by: NURSE PRACTITIONER

## 2023-11-16 PROCEDURE — 1123F ACP DISCUSS/DSCN MKR DOCD: CPT | Performed by: NURSE PRACTITIONER

## 2023-11-16 RX ORDER — LORATADINE 10 MG/1
10 TABLET ORAL DAILY
Qty: 90 TABLET | Refills: 1 | Status: SHIPPED | OUTPATIENT
Start: 2023-11-16

## 2023-11-16 RX ORDER — ATENOLOL 50 MG/1
50 TABLET ORAL DAILY
Qty: 90 TABLET | Refills: 1 | Status: SHIPPED | OUTPATIENT
Start: 2023-11-16

## 2023-11-16 RX ORDER — VENLAFAXINE HYDROCHLORIDE 150 MG/1
300 CAPSULE, EXTENDED RELEASE ORAL DAILY
Qty: 180 CAPSULE | Refills: 1 | Status: SHIPPED | OUTPATIENT
Start: 2023-11-16

## 2023-11-16 RX ORDER — MONTELUKAST SODIUM 10 MG/1
10 TABLET ORAL DAILY
Qty: 90 TABLET | Refills: 1 | Status: SHIPPED | OUTPATIENT
Start: 2023-11-16

## 2023-11-16 RX ORDER — LOSARTAN POTASSIUM 50 MG/1
50 TABLET ORAL DAILY
Qty: 90 TABLET | Refills: 1 | Status: SHIPPED | OUTPATIENT
Start: 2023-11-16

## 2023-11-16 RX ORDER — LEVOTHYROXINE SODIUM 0.1 MG/1
100 TABLET ORAL
Qty: 90 TABLET | Refills: 1 | Status: SHIPPED | OUTPATIENT
Start: 2023-11-16

## 2023-11-16 ASSESSMENT — ENCOUNTER SYMPTOMS
RESPIRATORY NEGATIVE: 1
ALLERGIC/IMMUNOLOGIC NEGATIVE: 1
GASTROINTESTINAL NEGATIVE: 1
EYES NEGATIVE: 1

## 2023-11-16 ASSESSMENT — PATIENT HEALTH QUESTIONNAIRE - PHQ9
SUM OF ALL RESPONSES TO PHQ QUESTIONS 1-9: 2
SUM OF ALL RESPONSES TO PHQ QUESTIONS 1-9: 2
1. LITTLE INTEREST OR PLEASURE IN DOING THINGS: 1
SUM OF ALL RESPONSES TO PHQ QUESTIONS 1-9: 2
SUM OF ALL RESPONSES TO PHQ QUESTIONS 1-9: 2
2. FEELING DOWN, DEPRESSED OR HOPELESS: 1
SUM OF ALL RESPONSES TO PHQ9 QUESTIONS 1 & 2: 2

## 2023-11-16 NOTE — PROGRESS NOTES
[address] [facility]  [address] [facility], [address] [place] [address]   () [phone] (fax) [phone]  SHAUN Maravilla      Chief Complaint   Patient presents with    Follow-up    Medication Refill          76 year-old white female comes into the office today to follow-up on chronic conditions. She is followed for hypertension,  GERD, anxiety, and depression. She is scheduled to see Dr. Fany Ruiz next week. She sees her daughter and grandchildren much more and is in great spirits today. Colonoscopy was in July 2021, repeat in 2026. She reports morning headaches have decreased. Still reports occasional fatigue. She reports a hx of mild sleep apnea but has gained some weight over the past several months. She went  for the sleep study and now has a CPAP that she does not use. Recently had cataract surgery.          Allergies   Allergen Reactions    Latex Other (See Comments)     Burns; blisters    Cephalexin Hives    Lisinopril Other (See Comments)    Penicillins Hives    Sulfa Antibiotics Hives       Past Medical History:   Diagnosis Date    Adverse effect of anesthesia     hard to wake up    Allergic rhinitis     Asthma     Autoimmune disease ([address])     fibromyalgia    DDD (degenerative disc disease), lumbar     Depression     Diabetes ([address])     pre-diabetes    Diverticulitis 2016    diagnosed at Colonoscopy    Fibromyalgia     Fibromyalgia     Fracture 2016    right foot    GERD (gastroesophageal reflux disease)     Herpes genitalis in women     dormant    Hypertension     IBS (irritable bowel syndrome)     Morbid obesity ([address])     Primary hypothyroidism     PUD (peptic ulcer disease)     Sleep apnea     had 2 studies    Status post right knee replacement 7/25/2018       Family History   Problem Relation Age of Onset    Thyroid Disease Daughter         Hypothyroidism    Breast Cancer Neg Hx     Diabetes Sister     Cancer Mother         Lung    GERD Father

## 2023-11-17 LAB
25(OH)D3 SERPL-MCNC: 29.4 NG/ML (ref 30–100)
ALBUMIN SERPL-MCNC: 3.7 G/DL (ref 3.2–4.6)
ALBUMIN/GLOB SERPL: 1 (ref 0.4–1.6)
ALP SERPL-CCNC: 144 U/L (ref 50–136)
ALT SERPL-CCNC: 26 U/L (ref 12–65)
ANION GAP SERPL CALC-SCNC: 6 MMOL/L (ref 2–11)
AST SERPL-CCNC: 19 U/L (ref 15–37)
BILIRUB SERPL-MCNC: 0.4 MG/DL (ref 0.2–1.1)
BUN SERPL-MCNC: 18 MG/DL (ref 8–23)
CALCIUM SERPL-MCNC: 9.1 MG/DL (ref 8.3–10.4)
CHLORIDE SERPL-SCNC: 106 MMOL/L (ref 101–110)
CHOLEST SERPL-MCNC: 185 MG/DL
CO2 SERPL-SCNC: 29 MMOL/L (ref 21–32)
CREAT SERPL-MCNC: 0.9 MG/DL (ref 0.6–1)
GLOBULIN SER CALC-MCNC: 3.6 G/DL (ref 2.8–4.5)
GLUCOSE SERPL-MCNC: 107 MG/DL (ref 65–100)
HDLC SERPL-MCNC: 79 MG/DL (ref 40–60)
HDLC SERPL: 2.3
LDLC SERPL CALC-MCNC: 86.4 MG/DL
MAGNESIUM SERPL-MCNC: 2.4 MG/DL (ref 1.8–2.4)
POTASSIUM SERPL-SCNC: 4.4 MMOL/L (ref 3.5–5.1)
PROT SERPL-MCNC: 7.3 G/DL (ref 6.3–8.2)
SODIUM SERPL-SCNC: 141 MMOL/L (ref 133–143)
TRIGL SERPL-MCNC: 98 MG/DL (ref 35–150)
VLDLC SERPL CALC-MCNC: 19.6 MG/DL (ref 6–23)

## 2023-11-20 NOTE — RESULT ENCOUNTER NOTE
To pt. Vitamin D has increased to 29.4. Continue Vitamin D 2000 units daily. Xzic=399, Trig=98, HDL=79, LDL=86.4. A1C=5.5. Liver enzymes are wnl. Mg+ are wnl.     Thanks

## 2023-12-19 DIAGNOSIS — E03.9 HYPOTHYROIDISM, UNSPECIFIED TYPE: ICD-10-CM

## 2023-12-19 RX ORDER — LEVOTHYROXINE SODIUM 88 UG/1
88 TABLET ORAL
Qty: 90 TABLET | Refills: 1 | OUTPATIENT
Start: 2023-12-19

## 2023-12-21 DIAGNOSIS — E03.9 HYPOTHYROIDISM, UNSPECIFIED TYPE: ICD-10-CM

## 2023-12-21 RX ORDER — LEVOTHYROXINE SODIUM 0.1 MG/1
100 TABLET ORAL
Qty: 90 TABLET | Refills: 1 | OUTPATIENT
Start: 2023-12-21

## 2024-01-08 DIAGNOSIS — I10 PRIMARY HYPERTENSION: ICD-10-CM

## 2024-01-08 RX ORDER — LOSARTAN POTASSIUM 50 MG/1
50 TABLET ORAL DAILY
Qty: 90 TABLET | Refills: 1 | OUTPATIENT
Start: 2024-01-08

## 2024-01-08 RX ORDER — ATENOLOL 50 MG/1
50 TABLET ORAL DAILY
Qty: 90 TABLET | Refills: 1 | OUTPATIENT
Start: 2024-01-08

## 2024-02-14 ENCOUNTER — OFFICE VISIT (OUTPATIENT)
Dept: FAMILY MEDICINE CLINIC | Facility: CLINIC | Age: 69
End: 2024-02-14
Payer: COMMERCIAL

## 2024-02-14 VITALS
OXYGEN SATURATION: 98 % | HEART RATE: 83 BPM | BODY MASS INDEX: 50.05 KG/M2 | SYSTOLIC BLOOD PRESSURE: 128 MMHG | HEIGHT: 62 IN | DIASTOLIC BLOOD PRESSURE: 64 MMHG | WEIGHT: 272 LBS | TEMPERATURE: 98.4 F

## 2024-02-14 DIAGNOSIS — J45.909 MILD ASTHMA WITHOUT COMPLICATION, UNSPECIFIED WHETHER PERSISTENT: ICD-10-CM

## 2024-02-14 DIAGNOSIS — I10 PRIMARY HYPERTENSION: ICD-10-CM

## 2024-02-14 DIAGNOSIS — E03.9 HYPOTHYROIDISM, UNSPECIFIED TYPE: ICD-10-CM

## 2024-02-14 DIAGNOSIS — E78.2 MIXED HYPERLIPIDEMIA: Primary | ICD-10-CM

## 2024-02-14 DIAGNOSIS — K21.9 GASTROESOPHAGEAL REFLUX DISEASE WITHOUT ESOPHAGITIS: ICD-10-CM

## 2024-02-14 DIAGNOSIS — E78.2 MIXED HYPERLIPIDEMIA: ICD-10-CM

## 2024-02-14 DIAGNOSIS — F32.A DEPRESSION, UNSPECIFIED DEPRESSION TYPE: ICD-10-CM

## 2024-02-14 DIAGNOSIS — J30.1 ALLERGIC RHINITIS DUE TO POLLEN, UNSPECIFIED SEASONALITY: ICD-10-CM

## 2024-02-14 DIAGNOSIS — R73.09 ELEVATED GLUCOSE: ICD-10-CM

## 2024-02-14 LAB
ALBUMIN SERPL-MCNC: 4 G/DL (ref 3.2–4.6)
ALBUMIN/GLOB SERPL: 1.1 (ref 0.4–1.6)
ALP SERPL-CCNC: 141 U/L (ref 50–136)
ALT SERPL-CCNC: 28 U/L (ref 12–65)
ANION GAP SERPL CALC-SCNC: 6 MMOL/L (ref 2–11)
AST SERPL-CCNC: 18 U/L (ref 15–37)
BASOPHILS # BLD: 0 K/UL (ref 0–0.2)
BASOPHILS NFR BLD: 0 % (ref 0–2)
BILIRUB SERPL-MCNC: 0.3 MG/DL (ref 0.2–1.1)
BUN SERPL-MCNC: 21 MG/DL (ref 8–23)
CALCIUM SERPL-MCNC: 9.8 MG/DL (ref 8.3–10.4)
CHLORIDE SERPL-SCNC: 110 MMOL/L (ref 103–113)
CHOLEST SERPL-MCNC: 204 MG/DL
CK SERPL-CCNC: 50 U/L (ref 21–215)
CO2 SERPL-SCNC: 29 MMOL/L (ref 21–32)
CREAT SERPL-MCNC: 0.8 MG/DL (ref 0.6–1)
DIFFERENTIAL METHOD BLD: ABNORMAL
EOSINOPHIL # BLD: 0 K/UL (ref 0–0.8)
EOSINOPHIL NFR BLD: 0 % (ref 0.5–7.8)
ERYTHROCYTE [DISTWIDTH] IN BLOOD BY AUTOMATED COUNT: 13.9 % (ref 11.9–14.6)
GLOBULIN SER CALC-MCNC: 3.6 G/DL (ref 2.8–4.5)
GLUCOSE SERPL-MCNC: 103 MG/DL (ref 65–100)
HBA1C MFR BLD: 5.6 %
HCT VFR BLD AUTO: 42.7 % (ref 35.8–46.3)
HDLC SERPL-MCNC: 85 MG/DL (ref 40–60)
HDLC SERPL: 2.4
HGB BLD-MCNC: 13.4 G/DL (ref 11.7–15.4)
IMM GRANULOCYTES # BLD AUTO: 0 K/UL (ref 0–0.5)
IMM GRANULOCYTES NFR BLD AUTO: 1 % (ref 0–5)
LDLC SERPL CALC-MCNC: 97.2 MG/DL
LYMPHOCYTES # BLD: 1.3 K/UL (ref 0.5–4.6)
LYMPHOCYTES NFR BLD: 23 % (ref 13–44)
MAGNESIUM SERPL-MCNC: 2.4 MG/DL (ref 1.8–2.4)
MCH RBC QN AUTO: 29 PG (ref 26.1–32.9)
MCHC RBC AUTO-ENTMCNC: 31.4 G/DL (ref 31.4–35)
MCV RBC AUTO: 92.4 FL (ref 82–102)
MICROALBUMIN URINE, POC: 30 MG/L
MONOCYTES # BLD: 0.5 K/UL (ref 0.1–1.3)
MONOCYTES NFR BLD: 8 % (ref 4–12)
NEUTS SEG # BLD: 3.9 K/UL (ref 1.7–8.2)
NEUTS SEG NFR BLD: 68 % (ref 43–78)
NRBC # BLD: 0 K/UL (ref 0–0.2)
PLATELET # BLD AUTO: 266 K/UL (ref 150–450)
PMV BLD AUTO: 10.6 FL (ref 9.4–12.3)
POTASSIUM SERPL-SCNC: 4.5 MMOL/L (ref 3.5–5.1)
PROT SERPL-MCNC: 7.6 G/DL (ref 6.3–8.2)
RBC # BLD AUTO: 4.62 M/UL (ref 4.05–5.2)
SODIUM SERPL-SCNC: 145 MMOL/L (ref 136–146)
TRIGL SERPL-MCNC: 109 MG/DL (ref 35–150)
TSH, 3RD GENERATION: 9.34 UIU/ML (ref 0.36–3.74)
VLDLC SERPL CALC-MCNC: 21.8 MG/DL (ref 6–23)
WBC # BLD AUTO: 5.8 K/UL (ref 4.3–11.1)

## 2024-02-14 PROCEDURE — 3074F SYST BP LT 130 MM HG: CPT | Performed by: NURSE PRACTITIONER

## 2024-02-14 PROCEDURE — 1123F ACP DISCUSS/DSCN MKR DOCD: CPT | Performed by: NURSE PRACTITIONER

## 2024-02-14 PROCEDURE — 99214 OFFICE O/P EST MOD 30 MIN: CPT | Performed by: NURSE PRACTITIONER

## 2024-02-14 PROCEDURE — 83036 HEMOGLOBIN GLYCOSYLATED A1C: CPT | Performed by: NURSE PRACTITIONER

## 2024-02-14 PROCEDURE — 82044 UR ALBUMIN SEMIQUANTITATIVE: CPT | Performed by: NURSE PRACTITIONER

## 2024-02-14 PROCEDURE — 3078F DIAST BP <80 MM HG: CPT | Performed by: NURSE PRACTITIONER

## 2024-02-14 RX ORDER — LOSARTAN POTASSIUM 50 MG/1
50 TABLET ORAL DAILY
Qty: 90 TABLET | Refills: 1 | Status: SHIPPED | OUTPATIENT
Start: 2024-02-14

## 2024-02-14 RX ORDER — ATENOLOL 50 MG/1
50 TABLET ORAL DAILY
Qty: 90 TABLET | Refills: 1 | Status: SHIPPED | OUTPATIENT
Start: 2024-02-14

## 2024-02-14 RX ORDER — FLUTICASONE PROPIONATE 50 MCG
2 SPRAY, SUSPENSION (ML) NASAL DAILY
Qty: 16 G | Refills: 2 | Status: SHIPPED | OUTPATIENT
Start: 2024-02-14

## 2024-02-14 RX ORDER — MONTELUKAST SODIUM 10 MG/1
10 TABLET ORAL DAILY
Qty: 90 TABLET | Refills: 1 | Status: SHIPPED | OUTPATIENT
Start: 2024-02-14

## 2024-02-14 RX ORDER — VENLAFAXINE HYDROCHLORIDE 150 MG/1
300 CAPSULE, EXTENDED RELEASE ORAL DAILY
Qty: 180 CAPSULE | Refills: 1 | Status: SHIPPED | OUTPATIENT
Start: 2024-02-14

## 2024-02-14 RX ORDER — LORATADINE 10 MG/1
10 TABLET ORAL DAILY
Qty: 90 TABLET | Refills: 1 | Status: SHIPPED | OUTPATIENT
Start: 2024-02-14

## 2024-02-14 RX ORDER — LEVOTHYROXINE SODIUM 88 UG/1
88 TABLET ORAL DAILY
Qty: 30 TABLET | Refills: 3 | Status: SHIPPED | OUTPATIENT
Start: 2024-02-14

## 2024-02-14 RX ORDER — LEVOTHYROXINE SODIUM 0.1 MG/1
100 TABLET ORAL
Qty: 90 TABLET | Refills: 1 | Status: CANCELLED | OUTPATIENT
Start: 2024-02-14

## 2024-02-14 RX ORDER — LEVOTHYROXINE SODIUM 0.1 MG/1
100 TABLET ORAL DAILY
Qty: 30 TABLET | Refills: 3 | Status: SHIPPED | OUTPATIENT
Start: 2024-02-14

## 2024-02-14 NOTE — PROGRESS NOTES
[facility] [address]  [place] [address]   () [phone] (fax) [phone]  ARON Maravilla-YOHAN      Chief Complaint   Patient presents with    Follow-up     Pt states takes a 100mcg and 88 mcg of the synthroid I dont see the 88mcg to pend please put on script she is take the 88 with 100       68  year-old white female comes into the office today to follow-up on chronic conditions.  She is followed for hypertension,  GERD, anxiety, and depression.   She was scheduled to see Dr. Narayanan but cancelled.  She does not feel that she needs this anymore. She sees her daughter and grandchildren much more and is in great spirits today. Colonoscopy was in July 2021, repeat in 2026.She reports morning headaches have decreased.  Still reports occasional fatigue. She reports a hx of mild sleep apnea but has gained some weight over the past several months. She went  for the sleep study and now has a CPAP that she does not use. [facility] her [facility] prior to having hernia surgery.         Allergies   Allergen Reactions    Latex Other (See Comments)     Burns; blisters    Cephalexin Hives    Lisinopril Other (See Comments)    Penicillins Hives    Sulfa Antibiotics Hives       Past Medical History:   Diagnosis Date    Adverse effect of anesthesia     hard to wake up    Allergic rhinitis     Asthma     Autoimmune disease (HCC)     fibromyalgia    DDD (degenerative disc disease), lumbar     Depression     Diabetes (HCC)     pre-diabetes    Diverticulitis 2016    diagnosed at Colonoscopy    Fibromyalgia     Fibromyalgia     Fracture 2016    right foot    GERD (gastroesophageal reflux disease)     Herpes genitalis in women     dormant    Hypertension     IBS (irritable bowel syndrome)     Morbid obesity (HCC)     Primary hypothyroidism     PUD (peptic ulcer disease)     Sleep apnea     had 2 studies    Status post

## 2024-02-15 NOTE — RESULT ENCOUNTER NOTE
Results to patient please.  TSH is elevated.  Has she has missed more than 1 day of her thyroid medicine, please remain on the same dose and will recheck level in 6 weeks.  If she has not missed any days, we will increase dose to 200 mcg p.o. daily.  She is now taking levothyroxine 100 mcg pill as well as an 88 mcg pill.  Total cholesterol is 204, triglycerides 109, HDL is 85 and LDL is 97.2.  Electrolytes are within normal limits.  Glucose is 103.  Liver enzymes are within normal limits.  Alk phos is slightly elevated.  U/S from 2020 revealed IMPRESSION:  Impression:  1.  Hepatomegaly with diffuse hepatic steatosis and multiple hepatic cysts the  largest in the left liver lobe.  When has she seen GI last?  Thanks    Thanks

## 2024-02-20 DIAGNOSIS — K76.0 HEPATIC STEATOSIS: ICD-10-CM

## 2024-02-20 DIAGNOSIS — R16.0 HEPATOMEGALY: Primary | ICD-10-CM

## 2024-02-20 DIAGNOSIS — K76.89 HEPATIC CYST: ICD-10-CM

## 2024-04-14 NOTE — PROGRESS NOTES
History:   Diagnosis Date    Adverse effect of anesthesia     hard to wake up    Allergic rhinitis     Asthma     Autoimmune disease (HCC)     fibromyalgia    DDD (degenerative disc disease), lumbar     Depression     Diabetes (HCC)     pre-diabetes    Diverticulitis 2016    diagnosed at Colonoscopy    Fibromyalgia     Fibromyalgia     Fracture 2016    right foot    GERD (gastroesophageal reflux disease)     Herpes genitalis in women     dormant    Hypertension     IBS (irritable bowel syndrome)     Morbid obesity (HCC)     Primary hypothyroidism     PUD (peptic ulcer disease)     Sleep apnea     had 2 studies    Status post right knee replacement 7/25/2018         Patient Active Problem List   Diagnosis    Gastroesophageal reflux disease without esophagitis    Blood glucose elevated    Depression, major, recurrent, mild ([facility])    Depression    Primary hypothyroidism    Vitamin D deficiency    Anemia    DDD (degenerative disc disease), lumbar    Status post right knee replacement    Genital herpes simplex    IBS (irritable bowel syndrome)    Hypertension    Anxiety    Asthma    Insomnia    Fibromyalgia    Seasonal allergic rhinitis    Chronic fatigue    Obesity, morbid, BMI 40.0-49.9 ([facility])    Hypersomnia    HAIR (obstructive sleep apnea)    Nocturnal hypoxemia          Past Surgical History:   Procedure Laterality Date    COLONOSCOPY  09/2016    HYSTERECTOMY (CERVIX STATUS UNKNOWN)      TUBAL LIGATION      UMBILICAL HERNIA REPAIR  1994    UPPER GASTROINTESTINAL ENDOSCOPY      during colonoscopy           Social History     Socioeconomic History    Marital status:      Spouse name: Not on file    Number of children: Not on file    Years of education: Not on file    Highest education level: Not on file   Occupational History    Not on file   Tobacco Use    Smoking status: Never    Smokeless tobacco: Never   Substance and Sexual Activity    Alcohol use: Not Currently    Drug use: No    Sexual activity: Not

## 2024-04-15 ENCOUNTER — OFFICE VISIT (OUTPATIENT)
Dept: SLEEP MEDICINE | Age: 69
End: 2024-04-15
Payer: COMMERCIAL

## 2024-04-15 VITALS
OXYGEN SATURATION: 95 % | HEIGHT: 62 IN | SYSTOLIC BLOOD PRESSURE: 147 MMHG | RESPIRATION RATE: 16 BRPM | TEMPERATURE: 97.3 F | DIASTOLIC BLOOD PRESSURE: 84 MMHG | BODY MASS INDEX: 50.31 KG/M2 | HEART RATE: 85 BPM | WEIGHT: 273.4 LBS

## 2024-04-15 DIAGNOSIS — G47.33 OSA (OBSTRUCTIVE SLEEP APNEA): Primary | ICD-10-CM

## 2024-04-15 DIAGNOSIS — G47.34 NOCTURNAL HYPOXEMIA: ICD-10-CM

## 2024-04-15 DIAGNOSIS — G47.10 HYPERSOMNIA: ICD-10-CM

## 2024-04-15 PROCEDURE — 99213 OFFICE O/P EST LOW 20 MIN: CPT | Performed by: PHYSICIAN ASSISTANT

## 2024-04-15 PROCEDURE — 1123F ACP DISCUSS/DSCN MKR DOCD: CPT | Performed by: PHYSICIAN ASSISTANT

## 2024-04-15 PROCEDURE — 3079F DIAST BP 80-89 MM HG: CPT | Performed by: PHYSICIAN ASSISTANT

## 2024-04-15 PROCEDURE — 3077F SYST BP >= 140 MM HG: CPT | Performed by: PHYSICIAN ASSISTANT

## 2024-04-24 ENCOUNTER — OFFICE VISIT (OUTPATIENT)
Dept: GASTROENTEROLOGY | Age: 69
End: 2024-04-24
Payer: COMMERCIAL

## 2024-04-24 VITALS
WEIGHT: 276 LBS | HEIGHT: 65 IN | SYSTOLIC BLOOD PRESSURE: 136 MMHG | DIASTOLIC BLOOD PRESSURE: 80 MMHG | BODY MASS INDEX: 45.98 KG/M2 | RESPIRATION RATE: 16 BRPM | HEART RATE: 76 BPM | TEMPERATURE: 96.6 F | OXYGEN SATURATION: 98 %

## 2024-04-24 DIAGNOSIS — K21.9 GASTROESOPHAGEAL REFLUX DISEASE WITHOUT ESOPHAGITIS: Primary | ICD-10-CM

## 2024-04-24 DIAGNOSIS — R10.9 ABDOMINAL PAIN, UNSPECIFIED ABDOMINAL LOCATION: ICD-10-CM

## 2024-04-24 PROCEDURE — 1123F ACP DISCUSS/DSCN MKR DOCD: CPT | Performed by: STUDENT IN AN ORGANIZED HEALTH CARE EDUCATION/TRAINING PROGRAM

## 2024-04-24 PROCEDURE — 3075F SYST BP GE 130 - 139MM HG: CPT | Performed by: STUDENT IN AN ORGANIZED HEALTH CARE EDUCATION/TRAINING PROGRAM

## 2024-04-24 PROCEDURE — 99204 OFFICE O/P NEW MOD 45 MIN: CPT | Performed by: STUDENT IN AN ORGANIZED HEALTH CARE EDUCATION/TRAINING PROGRAM

## 2024-04-24 PROCEDURE — 3079F DIAST BP 80-89 MM HG: CPT | Performed by: STUDENT IN AN ORGANIZED HEALTH CARE EDUCATION/TRAINING PROGRAM

## 2024-04-24 NOTE — PROGRESS NOTES
Bee Malloy is 68 y.o. y/o female here for initial evaluation.     CT :   IMPRESSION:  Umbilical hernia containing transverse colon.    ALP elevated, normal LFTs otherwise.     She has history of IBS, GERD with hiatal hernia, diverticulosis, fatty liver disease.  She reports that she has IBS with constipation for which she takes Metamucil however sometimes her constipation does not get better with that.  Her last colonoscopy was about 3 years ago and had multiple polyps, recommended to have repeat colonoscopy in 3 years at that time.    Past Medical History:   Diagnosis Date    Adverse effect of anesthesia     hard to wake up    Allergic rhinitis     Asthma     Autoimmune disease (HCC)     fibromyalgia    DDD (degenerative disc disease), lumbar     Depression     Diabetes (HCC)     pre-diabetes    Diverticulitis 2016    diagnosed at Colonoscopy    Fibromyalgia     Fibromyalgia     Fracture 2016    right foot    GERD (gastroesophageal reflux disease)     Herpes genitalis in women     dormant    Hypertension     IBS (irritable bowel syndrome)     Morbid obesity (HCC)     Primary hypothyroidism     PUD (peptic ulcer disease)     Sleep apnea     had 2 studies    Status post right knee replacement 7/25/2018     Past Surgical History:   Procedure Laterality Date    COLONOSCOPY  09/2016    HYSTERECTOMY (CERVIX STATUS UNKNOWN)      TUBAL LIGATION      UMBILICAL HERNIA REPAIR  1994    UPPER GASTROINTESTINAL ENDOSCOPY      during colonoscopy     Family History   Problem Relation Age of Onset    Thyroid Disease Daughter         Hypothyroidism    Diabetes Sister     Cancer Mother         Lung    Allergy (Severe) Mother     GERD Father     Hypertension Father     COPD Father     Diabetes Paternal Uncle     Breast Cancer Neg Hx      Social History     Tobacco Use    Smoking status: Never    Smokeless tobacco: Never   Substance Use Topics    Alcohol use: Not Currently    Drug use: No     Allergies   Allergen Reactions

## 2024-04-25 PROBLEM — Z12.11 ENCOUNTER FOR SCREENING COLONOSCOPY: Status: ACTIVE | Noted: 2024-04-24

## 2025-01-15 NOTE — PROGRESS NOTES
GENERAL SURGERY NEW PATIENT NOTE    PRIMARY CARE PROVIDER:Lolis Bray, APRN - CNP    REFERRING PHYSICIAN: Fabi Sanford    Dear Dr. Hernán Hopkins,    I had the pleasure of seeing your patient, Annette Walker, in the General Surgery Clinic at [facility]. My findings and recommendations are as follows:    Date of visit: 09/27/23    CHIEF COMPLAINT:   Chief Complaint   Patient presents with    Hernia     Umbilical hernia       HISTORY OF PRESENT ILLNESS: Annette Walker is a 76 y.o. female with history of morbid obesity with a BMI of 49, HAIR, GERD, HTN, DDD, IBS, diverticulitis, and previous repair of umbilical hernia with mesh referred to me for evaluation of umbilical hernia. Patient describes that she first felt a bulge above her bellybutton over 20 years ago. She states that during her tubal ligation about 20 years ago she had an umbilical hernia that was repaired using mesh at the same time. However, over the past year she describes that she felt a bulge above her bellybutton that started to become larger and more bothersome. Therefore, patient underwent CT scan that was ordered by her PCP which demonstrated approximately 5 x 4.5 cm ventral hernia containing a loop of transverse colon. Patient currently states that she has intermittent abdominal pain which can change location. She describes some intermittent pain over the bulge in her abdomen, which occurs on a weekly basis. Denies any changes in bowel habits after she noticed the bulge in her abdomen enlarged. Patient also describes that she has gained about 30 pounds over the last year despite multiple attempts at diet and exercise and adjustments in her levothyroxine by her PCP. Denies any fevers, chills, chest pain, shortness of breath, hematochezia, melena.       Prior Abdominal Surgery: Laparoscopic tubal ligation and umbilical hernia repair with mesh approximately 20 years ago, hysterectomy  Cardiac History:
immune

## (undated) DEVICE — SYRINGE CATH TIP 50ML

## (undated) DEVICE — BLADE SAW PAT RMR PILT H 41MM --

## (undated) DEVICE — SOLUTION IRRIG 3000ML 0.9% SOD CHL FLX CONT 0797208] ICU MEDICAL INC]

## (undated) DEVICE — BUTTON SWITCH PENCIL BLADE ELECTRODE, HOLSTER: Brand: EDGE

## (undated) DEVICE — Z DISCONTINUED USE 2744636  DRESSING AQUACEL 14 IN ALG W3.5XL14IN POLYUR FLM CVR W/ HYDRCOLL

## (undated) DEVICE — X-LARGE COTTON GLOVE: Brand: DEROYAL

## (undated) DEVICE — SUTURE VCRL SZ 1 L27IN ABSRB UD L36MM CP-1 1/2 CIR REV CUT J268H

## (undated) DEVICE — SYR LR LCK 1ML GRAD NSAF 30ML --

## (undated) DEVICE — T4 HOOD

## (undated) DEVICE — PRECISION FALCON OSCILLATING TIP SAW CARTRIDGE: Brand: PRECISION FALCON

## (undated) DEVICE — BIPOLAR SEALER 23-112-1 AQM 6.0: Brand: AQUAMANTYS ®

## (undated) DEVICE — 3M™ STERI-DRAPE™ INSTRUMENT POUCH 1018: Brand: STERI-DRAPE™

## (undated) DEVICE — SYR 50ML LR LCK 1ML GRAD NSAF --

## (undated) DEVICE — SYR 10ML LUER LOK 1/5ML GRAD --

## (undated) DEVICE — CATHETER URETH 16FR BLLN 5CC L16IN SIL F INDWL 2 W SHT LEN

## (undated) DEVICE — SIZER SURG TIB KT DISP TRIATHLON PRECIS

## (undated) DEVICE — SKIN STAPLER: Brand: SIGNET

## (undated) DEVICE — SUTURE VCRL SZ 2-0 L27IN ABSRB UD L36MM CP-1 1/2 CIR REV J266H

## (undated) DEVICE — Device

## (undated) DEVICE — SUT ETHBND 2 30IN LR DA GRN --

## (undated) DEVICE — (D)PREP SKN CHLRAPRP APPL 26ML -- CONVERT TO ITEM 371833

## (undated) DEVICE — DRAPE,TOP,102X53,STERILE: Brand: MEDLINE

## (undated) DEVICE — CURETTE BNE CEM 10IN DISP --

## (undated) DEVICE — SOLUTION IV DEXTROSE/SALINE 5%-0.9% 500ML - 500ML

## (undated) DEVICE — BANDAGE COMPR SELF ADH 5 YDX4 IN TAN STRL PREMIERPRO LF

## (undated) DEVICE — MEDI-VAC YANKAUER SUCTION HANDLE W/BULBOUS TIP: Brand: CARDINAL HEALTH

## (undated) DEVICE — TRAY CATH 16F DRN BG LTX -- CONVERT TO ITEM 363158

## (undated) DEVICE — 3000CC GUARDIAN II: Brand: GUARDIAN

## (undated) DEVICE — 2000CC GUARDIAN II: Brand: GUARDIAN

## (undated) DEVICE — SOLUTION IV 1000ML 0.9% SOD CHL

## (undated) DEVICE — FAN SPRAY KIT: Brand: PULSAVAC®

## (undated) DEVICE — REM POLYHESIVE ADULT PATIENT RETURN ELECTRODE: Brand: VALLEYLAB

## (undated) DEVICE — PACK PROCEDURE SURG TOT KNEE

## (undated) DEVICE — SUTURE STRATAFIX SYMMETRIC PDS + SZ 2-0 L18IN ABSRB VLT SXPP1A403

## (undated) DEVICE — TRAY PREP DRY W/ PREM GLV 2 APPL 6 SPNG 2 UNDPD 1 OVERWRAP

## (undated) DEVICE — SUTURE PDS II SZ 1 L96IN ABSRB VLT TP-1 L65MM 1/2 CIR Z880G

## (undated) DEVICE — SUTURE ABSRB X-1 REV CUT 1/2 CIR 22MM UD BRAID 27IN SZ 3-0 J458H